# Patient Record
Sex: FEMALE | Race: WHITE | NOT HISPANIC OR LATINO | Employment: OTHER | URBAN - METROPOLITAN AREA
[De-identification: names, ages, dates, MRNs, and addresses within clinical notes are randomized per-mention and may not be internally consistent; named-entity substitution may affect disease eponyms.]

---

## 2017-01-03 ENCOUNTER — ALLSCRIPTS OFFICE VISIT (OUTPATIENT)
Dept: OTHER | Facility: OTHER | Age: 77
End: 2017-01-03

## 2017-01-04 ENCOUNTER — TRANSCRIBE ORDERS (OUTPATIENT)
Dept: ADMINISTRATIVE | Age: 77
End: 2017-01-04

## 2017-01-04 ENCOUNTER — APPOINTMENT (OUTPATIENT)
Dept: LAB | Age: 77
End: 2017-01-04
Payer: MEDICARE

## 2017-01-04 DIAGNOSIS — N83.202 CYST OF LEFT OVARY: ICD-10-CM

## 2017-01-04 LAB — CANCER AG125 SERPL-ACNC: 9.5 U/ML (ref 0–30)

## 2017-01-04 PROCEDURE — 86304 IMMUNOASSAY TUMOR CA 125: CPT

## 2017-01-04 PROCEDURE — 36415 COLL VENOUS BLD VENIPUNCTURE: CPT

## 2017-01-18 ENCOUNTER — HOSPITAL ENCOUNTER (OUTPATIENT)
Dept: RADIOLOGY | Age: 77
Discharge: HOME | End: 2017-01-18
Payer: MEDICARE

## 2017-01-18 DIAGNOSIS — Z12.31 ENCOUNTER FOR SCREENING MAMMOGRAM FOR MALIGNANT NEOPLASM OF BREAST: ICD-10-CM

## 2017-01-18 PROCEDURE — G0202 SCR MAMMO BI INCL CAD: HCPCS

## 2017-03-27 ENCOUNTER — GENERIC CONVERSION - ENCOUNTER (OUTPATIENT)
Dept: OTHER | Facility: OTHER | Age: 77
End: 2017-03-27

## 2017-06-08 ENCOUNTER — ALLSCRIPTS OFFICE VISIT (OUTPATIENT)
Dept: OTHER | Facility: OTHER | Age: 77
End: 2017-06-08

## 2017-06-08 DIAGNOSIS — M85.80 OTHER SPECIFIED DISORDERS OF BONE DENSITY AND STRUCTURE, UNSPECIFIED SITE: ICD-10-CM

## 2017-06-08 DIAGNOSIS — E03.9 HYPOTHYROIDISM: ICD-10-CM

## 2017-06-08 DIAGNOSIS — I10 ESSENTIAL (PRIMARY) HYPERTENSION: ICD-10-CM

## 2017-06-08 DIAGNOSIS — E78.5 HYPERLIPIDEMIA: ICD-10-CM

## 2017-06-08 DIAGNOSIS — Z00.00 ENCOUNTER FOR GENERAL ADULT MEDICAL EXAMINATION WITHOUT ABNORMAL FINDINGS: ICD-10-CM

## 2017-06-15 ENCOUNTER — APPOINTMENT (OUTPATIENT)
Dept: LAB | Age: 77
End: 2017-06-15
Payer: MEDICARE

## 2017-06-15 ENCOUNTER — TRANSCRIBE ORDERS (OUTPATIENT)
Dept: ADMINISTRATIVE | Age: 77
End: 2017-06-15

## 2017-06-15 DIAGNOSIS — M85.80 OTHER SPECIFIED DISORDERS OF BONE DENSITY AND STRUCTURE, UNSPECIFIED SITE: ICD-10-CM

## 2017-06-15 DIAGNOSIS — I10 ESSENTIAL (PRIMARY) HYPERTENSION: ICD-10-CM

## 2017-06-15 DIAGNOSIS — E78.5 HYPERLIPIDEMIA: ICD-10-CM

## 2017-06-15 DIAGNOSIS — Z00.00 ENCOUNTER FOR GENERAL ADULT MEDICAL EXAMINATION WITHOUT ABNORMAL FINDINGS: ICD-10-CM

## 2017-06-15 LAB
ALBUMIN SERPL BCP-MCNC: 3.4 G/DL (ref 3.5–5)
ALP SERPL-CCNC: 53 U/L (ref 46–116)
ALT SERPL W P-5'-P-CCNC: 21 U/L (ref 12–78)
ANION GAP SERPL CALCULATED.3IONS-SCNC: 6 MMOL/L (ref 4–13)
AST SERPL W P-5'-P-CCNC: 16 U/L (ref 5–45)
BASOPHILS # BLD AUTO: 0.03 THOUSANDS/ΜL (ref 0–0.1)
BASOPHILS NFR BLD AUTO: 1 % (ref 0–1)
BILIRUB SERPL-MCNC: 0.62 MG/DL (ref 0.2–1)
BILIRUB UR QL STRIP: NEGATIVE
BUN SERPL-MCNC: 17 MG/DL (ref 5–25)
CALCIUM SERPL-MCNC: 8.6 MG/DL (ref 8.3–10.1)
CHLORIDE SERPL-SCNC: 104 MMOL/L (ref 100–108)
CHOLEST SERPL-MCNC: 184 MG/DL (ref 50–200)
CLARITY UR: CLEAR
CO2 SERPL-SCNC: 28 MMOL/L (ref 21–32)
COLOR UR: YELLOW
CREAT SERPL-MCNC: 0.84 MG/DL (ref 0.6–1.3)
EOSINOPHIL # BLD AUTO: 0.16 THOUSAND/ΜL (ref 0–0.61)
EOSINOPHIL NFR BLD AUTO: 3 % (ref 0–6)
ERYTHROCYTE [DISTWIDTH] IN BLOOD BY AUTOMATED COUNT: 12.1 % (ref 11.6–15.1)
GFR SERPL CREATININE-BSD FRML MDRD: >60 ML/MIN/1.73SQ M
GLUCOSE P FAST SERPL-MCNC: 92 MG/DL (ref 65–99)
GLUCOSE UR STRIP-MCNC: NEGATIVE MG/DL
HCT VFR BLD AUTO: 39 % (ref 34.8–46.1)
HDLC SERPL-MCNC: 51 MG/DL (ref 40–60)
HGB BLD-MCNC: 12.9 G/DL (ref 11.5–15.4)
HGB UR QL STRIP.AUTO: NEGATIVE
KETONES UR STRIP-MCNC: NEGATIVE MG/DL
LDLC SERPL CALC-MCNC: 111 MG/DL (ref 0–100)
LEUKOCYTE ESTERASE UR QL STRIP: NEGATIVE
LYMPHOCYTES # BLD AUTO: 1.43 THOUSANDS/ΜL (ref 0.6–4.47)
LYMPHOCYTES NFR BLD AUTO: 29 % (ref 14–44)
MCH RBC QN AUTO: 30.4 PG (ref 26.8–34.3)
MCHC RBC AUTO-ENTMCNC: 33.1 G/DL (ref 31.4–37.4)
MCV RBC AUTO: 92 FL (ref 82–98)
MONOCYTES # BLD AUTO: 0.5 THOUSAND/ΜL (ref 0.17–1.22)
MONOCYTES NFR BLD AUTO: 10 % (ref 4–12)
NEUTROPHILS # BLD AUTO: 2.74 THOUSANDS/ΜL (ref 1.85–7.62)
NEUTS SEG NFR BLD AUTO: 57 % (ref 43–75)
NITRITE UR QL STRIP: NEGATIVE
NRBC BLD AUTO-RTO: 0 /100 WBCS
PH UR STRIP.AUTO: 7 [PH] (ref 4.5–8)
PLATELET # BLD AUTO: 258 THOUSANDS/UL (ref 149–390)
PMV BLD AUTO: 10.4 FL (ref 8.9–12.7)
POTASSIUM SERPL-SCNC: 3.7 MMOL/L (ref 3.5–5.3)
PROT SERPL-MCNC: 6.2 G/DL (ref 6.4–8.2)
PROT UR STRIP-MCNC: NEGATIVE MG/DL
RBC # BLD AUTO: 4.24 MILLION/UL (ref 3.81–5.12)
SODIUM SERPL-SCNC: 138 MMOL/L (ref 136–145)
SP GR UR STRIP.AUTO: 1.01 (ref 1–1.03)
TRIGL SERPL-MCNC: 111 MG/DL
TSH SERPL DL<=0.05 MIU/L-ACNC: 3.84 UIU/ML (ref 0.36–3.74)
UROBILINOGEN UR QL STRIP.AUTO: 0.2 E.U./DL
WBC # BLD AUTO: 4.87 THOUSAND/UL (ref 4.31–10.16)

## 2017-06-15 PROCEDURE — 80053 COMPREHEN METABOLIC PANEL: CPT

## 2017-06-15 PROCEDURE — 84443 ASSAY THYROID STIM HORMONE: CPT

## 2017-06-15 PROCEDURE — 85025 COMPLETE CBC W/AUTO DIFF WBC: CPT

## 2017-06-15 PROCEDURE — 36415 COLL VENOUS BLD VENIPUNCTURE: CPT

## 2017-06-15 PROCEDURE — 81003 URINALYSIS AUTO W/O SCOPE: CPT

## 2017-06-15 PROCEDURE — 80061 LIPID PANEL: CPT

## 2017-07-28 ENCOUNTER — APPOINTMENT (OUTPATIENT)
Dept: LAB | Age: 77
End: 2017-07-28
Payer: MEDICARE

## 2017-07-28 ENCOUNTER — GENERIC CONVERSION - ENCOUNTER (OUTPATIENT)
Dept: OTHER | Facility: OTHER | Age: 77
End: 2017-07-28

## 2017-07-28 ENCOUNTER — TRANSCRIBE ORDERS (OUTPATIENT)
Dept: ADMINISTRATIVE | Age: 77
End: 2017-07-28

## 2017-07-28 DIAGNOSIS — E03.9 HYPOTHYROIDISM: ICD-10-CM

## 2017-07-28 LAB — TSH SERPL DL<=0.05 MIU/L-ACNC: 2.63 UIU/ML (ref 0.36–3.74)

## 2017-07-28 PROCEDURE — 36415 COLL VENOUS BLD VENIPUNCTURE: CPT

## 2017-07-28 PROCEDURE — 84443 ASSAY THYROID STIM HORMONE: CPT

## 2017-07-28 PROCEDURE — 86800 THYROGLOBULIN ANTIBODY: CPT

## 2017-07-28 PROCEDURE — 86376 MICROSOMAL ANTIBODY EACH: CPT

## 2017-07-29 LAB
THYROGLOB AB SERPL-ACNC: <1 IU/ML (ref 0–0.9)
THYROPEROXIDASE AB SERPL-ACNC: 10 IU/ML (ref 0–34)

## 2017-10-23 ENCOUNTER — GENERIC CONVERSION - ENCOUNTER (OUTPATIENT)
Dept: OTHER | Facility: OTHER | Age: 77
End: 2017-10-23

## 2017-12-12 ENCOUNTER — ALLSCRIPTS OFFICE VISIT (OUTPATIENT)
Dept: OTHER | Facility: OTHER | Age: 77
End: 2017-12-12

## 2018-01-11 ENCOUNTER — OFFICE VISIT (OUTPATIENT)
Dept: URGENT CARE | Age: 78
End: 2018-01-11
Payer: MEDICARE

## 2018-01-11 PROCEDURE — 99203 OFFICE O/P NEW LOW 30 MIN: CPT | Performed by: FAMILY MEDICINE

## 2018-01-11 PROCEDURE — G0463 HOSPITAL OUTPT CLINIC VISIT: HCPCS | Performed by: FAMILY MEDICINE

## 2018-01-13 VITALS
WEIGHT: 168.25 LBS | DIASTOLIC BLOOD PRESSURE: 80 MMHG | SYSTOLIC BLOOD PRESSURE: 150 MMHG | RESPIRATION RATE: 16 BRPM | HEART RATE: 60 BPM | TEMPERATURE: 97 F | BODY MASS INDEX: 26.41 KG/M2 | HEIGHT: 67 IN

## 2018-01-13 NOTE — PROGRESS NOTES
Assessment    1  Hyperlipidemia (272 4) (E78 5)   2  Hypertension (401 9) (I10)   3  Osteoarthritis of knee (715 36) (M17 9)   4  Cyst of left ovary (620 2) (N83 20)    Plan  Hyperlipidemia    · Follow-up visit in 6 months Evaluation and Treatment  Follow-up  Status: Hold For -  Scheduling  Requested for: 43MWY7179   · Eat a low fat and low cholesterol diet ; Status:Complete;   Done: 60FQI9795  Hyperlipidemia, Hypertension, Osteoarthritis of knee    · (1) CBC/PLT/DIFF; Status:Active; Requested for:90Kmz3086;    · (1) COMPREHENSIVE METABOLIC PANEL; Status:Active; Requested for:70Pek0768;    · (1) LIPID PANEL, FASTING; Status:Active; Requested for:81Bvg2498;    · (1) TSH; Status:Active; Requested for:62Ulu9084;   Hypertension    · We encourage you to begin to make lifestyle changes to help control your blood  pressure  These may include losing weight, increasing your activity level, limiting salt in  your diet, decreasing alcohol intake, and eating a diet low in fat and rich in fruits  and vegetables ; Status:Complete;   Done: 22MHC5883  Screening for genitourinary condition    · *VB-Urinary Incontinence Screen (Dx V81 6 Screen for UI); Status:Complete -  Retrospective Authorization;   Done: 10ILA6600 12:00AM  Screening for neurological condition    · *VB - Fall Risk Assessment  (Dx V80 09 Screen for Neurologic Disorder);  Status:Complete - Retrospective By Protocol Authorization;   Done: 65SUE8737 12:00AM    Discussion/Summary    Continue with current medications  repeat labs  OV in 6 months  she has a f/u with GYN including repeat pelvic u/s  The treatment plan was reviewed with the patient/guardian  The patient/guardian understands and agrees with the treatment plan      History of Present Illness  Followup visit  medications reviewed  last labs 6/2015 see note  patient was seen 08/2015 for accelerated hypertension   Amlodipine 2 5 mg daily added to regimen of Valsartan 320 mg daily and Metoprolol 50 mg BID  blood pressure control has been better  she had developed hyponatremia and hypokalemia on HCTZ      Review of Systems    Constitutional: no recent weight changes  Eyes: no blurred vision  Cardiovascular: admission 05/2014 at [facility] with left jaw and arm pain  Normal EKG  she ruled out for MI  Nuclear stress test prior to discharge normal  she has had no recurrence  , but no chest pain and no palpitations  Respiratory: no shortness of breath, no wheezing and no cough  Gastrointestinal: colonoscopy 11/2013, but no abdominal pain, no nausea, no vomiting, no diarrhea, no constipation, no bright red blood per rectum and no melena  Genitourinary: no UI  history of recurrent UTIs followed by urology  pelvis 11/2015 4 cm simple left ovarian cyst  07/2015  normal at 7 followed by GYN , but no dysuria  Musculoskeletal: pain in other joints and recurrent right knee pain  no swelling  no giving way or locking  prior orthopedic evaluation including MRI  meniscal tear  no improvement with steroid injection  recent 2nd opinion this month  right knee x rays OA  repeat steroid injection with improvement  she is using prn ES Tylenol and Advil, but no joint swelling and no joint stiffness  Integumentary and Breasts: no rashes and no skin lesions  Neurological: no falls, but no headache and no dizziness  Psychiatric: no anxiety and no depression  Endocrine: 02/2015 DEXA scan normal       Active Problems    1  Cyst of left ovary (620 2) (N83 20)   2  Diverticulosis (562 10) (K57 90)   3  Hyperlipidemia (272 4) (E78 5)   4  Hypertension (401 9) (I10)   5  Need for prophylactic vaccination against Streptococcus pneumoniae (pneumococcus)   (V03 82) (Z23)   6  Need for prophylactic vaccination and inoculation against influenza (V04 81) (Z23)   7  Osteoarthritis of knee (715 36) (M17 9)   8  Osteopenia (733 90) (M85 80)   9  Screening for genitourinary condition (V81 6) (Z13 89)   10   Screening for neurological condition (V80 09) (Z13 89)    Past Medical History    1  History of Abdominal wall pain in right flank (789 09) (R10 9)   2  History of Accelerated essential hypertension (401 0) (I10)   3  History of Benign colon polyp (211 3) (K63 5)   4  History of Depression with anxiety (300 4) (F41 8)   5  History of DEXA Body Composition Study   6  History of Encounter for screening mammogram for malignant neoplasm of breast   (V76 12) (Z12 31)   7  History of actinic keratosis (V13 3) (Z87 2)   8  History of chest pain (V13 89) (Z87 898)   9  History of Post-menopausal bleeding (627 1) (N95 0)   10  History of Routine Gynecological Exam With Cervical Pap Smear (V72 31)   11  History of Ultrasound Gynecologic   12  History of Urinary Tract Infection (V13 02)    Surgical History    1  History of Blepharoplasty Upper Lid W/ Excessive Skin   2  History of Cardiovascular Stress Test   3  History of Dilation And Curettage   4  History of Endometrial Biopsy By Suction   5  History of Tubal Ligation    Family History  Father    1  Family history of cardiac disorder (V17 49) (Z82 49)   2  Family history of skin cancer (V16 8) (Z80 8)  Family History    3  Family history of Coronary Artery Disease (V17 49)   4  Family history of Heart Disease (V17 49)   5  Family history of Hypertension (V17 49)    Social History    · Denied: History of Alcohol   · Denied: History of Home Environment Domestic Violence   · Never A Smoker    Current Meds   1  AmLODIPine Besylate 2 5 MG Oral Tablet; take 1 tablet by mouth every day; Therapy: 85UKO1620 to (KTOLXTYW:31SDV8316)  Requested for: 51SFS4454; Last   Rx:29Jan2016 Ordered   2  Calcium TABS; Therapy: (Recorded:19Mar2014) to Recorded   3  [employer] Vit D 5000 High-Potency CAPS; Therapy: (Recorded:19Mar2014) to Recorded   4  Methenamine Hippurate 1 GM Oral Tablet; take 1/2 tablet daily Recorded   5  Metoprolol Tartrate 50 MG Oral Tablet; TAKE 1 TABLET TWICE DAILY;    Therapy: 84HDL5056 to (FPFSEDTL:36WDC6907)  Requested for: 03NRK7108; Last   Rx:29Jan2016 Ordered   6  Valsartan 320 MG Oral Tablet; take one tablet by mouth every day; Therapy: 28MYB5713 to (Evaluate:14Jan2017)  Requested for: 37UPS4698; Last   Rx:20Jan2016 Ordered   7  Vitamin C 500 MG Oral Tablet Recorded    Allergies    1  Penicillins    Vitals  Vital Signs [Data Includes: Current Encounter]    Recorded: 49BOB4607 08:08AM   Temperature 97 6 F   Heart Rate 72   Respiration 16   Systolic 844   Diastolic 80   Height 5 ft 7 in   Weight 164 lb    BMI Calculated 25 69   BSA Calculated 1 86     Physical Exam    Constitutional   General appearance: No acute distress, well appearing and well nourished  Eyes   Conjunctiva and lids: No swelling, erythema or discharge  fundi not seen  Pupils and irises: Equal, round, reactive to light  Cornea, Lens, and Sclera: Bilateral eyes: normal    Ears, Nose, Mouth, and Throat   Otoscopic examination: Tympanic membranes translucent with normal light reflex  Canals patent without erythema  Oropharynx: Normal with no erythema, edema, exudate or lesions  Neck   Neck: Supple, symmetric, trachea midline, no masses  Thyroid: Normal, no thyromegaly  Pulmonary   Auscultation of lungs: Clear to auscultation  Cardiovascular   Auscultation of heart: Normal rate and rhythm, normal S1 and S2, no murmurs  Heart sounds: no gallop heard  Carotid pulses: 2+ bilaterally  Abdominal aorta: Normal   Abdominal aorta: no bruit heard  Examination of extremities for edema and/or varicosities: Normal     Abdomen   Abdomen: Non-tender, no masses  Liver and spleen: No hepatomegaly or splenomegaly  Lymphatic   Palpation of lymph nodes in neck: No lymphadenopathy  no anterior cervical node enlargement, no posterior cervical node enlargement, no submandibular node enlargement and no supraclavicular node enlargement     Musculoskeletal   Gait and station: Normal     Joints, bones, and muscles: Abnormal   crepitus right knee  Range of motion: Normal   Range of Motion: Right Hip: Full  Right Knee: Full  Stability: Normal     Skin   Skin and subcutaneous tissue: Normal without rashes or lesions  Psychiatric   Mood and affect: Normal        Results/Data  Encounter Results   *VB - Fall Risk Assessment  (Dx V80 09 Screen for Neurologic Disorder) 14PLF5142 12:00AM Harry Standard     Test Name Result Flag Reference   Fall Risk Assessment 45BVI5988       *VB-Urinary Incontinence Screen (Dx V81 6 Screen for UI) 58AYU0006 12:00AM Harry Standard     Test Name Result Flag Reference   Urinary Incontinence Assessment 11UZS6013       Results   * US PELVIS W/TRANSVAG (PANEL) 09XKZ6364 07:53AM Guadlupe Robinson     Test Name Result Flag Reference   US PELVIS W/ TRANSVAG (PANEL) (Report)     [facility];[address] [facility];;[place];[address]   11/11/2015 0755   11/11/2015 0829   N/A     PELVIC ULTRASOUND, COMPLETE     INDICATION- Unspecified ovarian cysts  History of ovarian cyst     Follow-up  COMPARISON- Pelvic ultrasound July 14, 2015     TECHNIQUE-  Transabdominal pelvic ultrasound was performed in sagittal   and transverse planes with a curvilinear transducer  Additional   transvaginal imaging was performed to better evaluate the endometrium   and ovaries  Imaging included volumetric sweeps as well as traditional   still imaging technique  FINDINGS-     UTERUS-   The uterus is anteverted in position, measuring 7 3 x 3 5 x 4 8 cm  Contour and echotexture appear normal      The cervix shows no suspicious abnormality  ENDOMETRIUM-    Normal caliber of 5 mm  Homogenous and normal in appearance  OVARIES/ADNEXA-   Right ovary- 1 2 x 1 4 x 1 1 cm  No suspicious right ovarian abnormality  Doppler flow within normal limits  Left ovary- 4 1 x 3 4 x 3 4 cm  No suspicious left ovarian abnormality  3 6 x 3 2 x 3 8 cm simple cyst   (previously 3 6 x 3 3 x 3 8 cm)     Doppler flow within normal limits  No suspicious adnexal mass or loculated collections  There is no free fluid  IMPRESSION- No significant change in the 4 cm simple left ovarian cyst    According to the recent consensus conference statement from the   Society of Radiologists in Ultrasound (Radiology-Volume 256- Number   3-September 2010  pp [phone] ) this lesion has imaging features of a   simple cyst  In this late postmenopausal woman, this is almost   certainly benign, but should receive yearly followup by ultrasound  Transcribed on- [id number]     [facility], RAD DO   Reading Radiologist- PEDRO Le DO   Electronically Signed- PEDRO Le DO   Released Date Time- 11/11/15 0841   ------------------------------------------------------------------------------   [id number]^YOUNG SORIANO PAPARO   [id number]^YOUNG ESPOSITOARO     (1)  78NUF6746 09:05AM Terri Rickie     Test Name Result Flag Reference   (NEW) 8 0 U/mL  0 0-30 0   Performed at [facility]  [employer] Chemiluminescent methodology  Results cannot be interpreted as absolute evidence of the presence or  the absence of malignant disease  Values obtained with different assay  methods or kits cannot be used interchangeably     * US PELVIS W/TRANSVAG (PANEL) 96TDS4971 08:30AM Terri Rickie     Test Name Result Flag Reference   US PELVIS W/ Jackie (PANEL) (Report)     [facility];[address] [facility];;[place];[address]   07/14/2015 0839   07/14/2015 0901   N/A     PELVIC ULTRASOUND, COMPLETE     INDICATION- Followup left ovarian cyst           COMPARISON- 12/18/2014, 10/31/2014  TECHNIQUE-  Transabdominal pelvic ultrasound was performed in sagittal   and transverse planes with a curvilinear transducer  Additional   transvaginal imaging was performed to better evaluate the endometrium   and ovaries  Imaging included volumetric sweeps as well as traditional   still imaging technique       FINDINGS- UTERUS-   The uterus is anteverted in position, measuring 7 6 x 2 8 x 4 7 cm  Contour and echotexture appear normal      The cervix shows no suspicious abnormality  ENDOMETRIUM-    Normal caliber of 5 mm  Homogenous and normal in appearance  OVARIES/ADNEXA-   Right ovary- 1 3 x 0 8 x 1 0 cm  No suspicious right ovarian abnormality  Doppler flow within normal limits  Left ovary- Minimally larger simple cyst measuring 3 8 x 3 3 x 3 6 CM   (previously 3 5 x 3 1 x 3 1 CM)  No suspicious adnexal mass or loculated collections  There is no free fluid  IMPRESSION-     Minimally larger left ovarian simple cyst                Transcribed on- [id number]     - PEDRO Kennedy MD   Reading Radiologist- PEDRO Kennedy MD   Electronically Signed- PEDRO Kennedy MD   Released Date Time- 07/14/15 1352   ------------------------------------------------------------------------------   [id number]^BRIAN MONTAÑO   30789^BRIAN MONTAÑO     (1) POTASSIUM 90LDI1651 07:49AM Crystal Patel     Test Name Result Flag Reference   POTASSIUM 4 4 mmol/L  3 5-5 3     (1) SODIUM 00VGO9104 07:49AM Crystal Patel     Test Name Result Flag Reference   SODIUM 136 mmol/L  136-145     (1) CBC/PLT/DIFF 94ZVS8462 08:31AM Elvi June Order Number: [id number]     Test Name Result Flag Reference   DIFFERENTIAL METHOD Automated     WBC COUNT 4 75 Thousand/uL  4 31-10 16   RBC COUNT 4 11 Million/uL  3 81-5 12   HEMOGLOBIN 12 5 g/dL  11 5-15 4   HEMATOCRIT 37 3 %  34 8-46  1   MCV 91 fL  82-98   MCH 30 4 pg  26 8-34 3   MCHC 33 5 g/dL  31 4-37 4   RDW 12 1 %  11 6-15 1   MPV 9 8 fL  8 9-12 7   PLATELET COUNT 133 Thousand/uL  149-390   nRBC AUTOMATED 0 /100 WBC     NEUTROPHILS RELATIVE PERCENT 50 %  43-75   LYMPHOCYTES RELATIVE PERCENT 33 %  14-44   MONOCYTES RELATIVE PERCENT 14 % H 4-12   EOSINOPHILS RELATIVE PERCENT 3 %  0-6   NEUTROPHILS ABSOLUTE COUNT 2 38 Thousand/uL  1 85-7 62 LYMPHOCYTES ABSOLUTE COUNT 1 57 Thousand/uL  0 60-4 47   MONOCYTES ABSOLUTE COUNT 0 67 Thousand/uL  0 17-1 22   EOSINOPHILS ABSOLUTE COUNT 0 14 Thousand/uL  0 00-0 61   BASOPHILS ABSOLUTE COUNT 0 02 Thousand/uL  0 00-0 10     (1) COMPREHENSIVE METABOLIC PANEL 01AXB3192 77:92SA Cheyanne Barraza Order Number: [id number]     Test Name Result Flag Reference   SODIUM 131 mmol/L L 136-145   POTASSIUM 3 3 mmol/L L 3 5-5 3   CHLORIDE 95 mmol/L L 100-108   CARBON DIOXIDE 32 mmol/L  23-33   ANION GAP (CALC) 4 mmol/L  4-13   BILI, TOTAL 0 46 mg/dL  0 20-1 00   TOTAL PROTEIN 6 7 g/dL  6 4-8 2   ALK PHOSPHATAS 59 U/L     ALT (SGPT) 22 U/L  14-59   AST(SGOT) 19 U/L  0-45   GLUCOSE,RANDM 98 mg/dL     If patient is fasting, the ADA then defines impaired fasting glucose as  >100 mg/dl and diabetes as  >or equal to 126 mg/dl  ALBUMIN 3 4 g/dL L 3 5-5 0   BLOOD UREA NITROGEN 21 mg/dL  5-25   CALCIUM 8 6 mg/dL  8 3-10 1   CREATININE 0 79 mg/dL  0 60-1 30   Standardized to IDMS reference method   eGFR African American >60 ml/min/1 73sq m     [facility] Disease Education Program recommendations are as  follows:  GFR calculation is accurate only with a steady state creatinine  Chronic Kidney disease less than 60 ml/min/1 73 sq  meters  Kidney failure less than 15 ml/min/1 73 sq  meters     eGFR Non-African American >60 ml/min/1 73sq m       (1) LIPID PANEL, FASTING 11Jun2015 08:31AM Cheyanne Barraza Order Number: [id number]     Test Name Result Flag Reference   TRIGLYCERIDES 66 mg/dL     TRIGLYCERIDE:  Normal              <150 mg/dl  Borderline High    150-199 mg/dl  High               200-499 mg/dl  Very High          >499 mg/dl  _______________________________________   CHOLESTEROL 185 mg/dL     CHOLESTEROL:  Desirable        <200 mg/dl  Borderline High  200-239 mg/dl  High             >239 mg/dl  ____________________________________   HDL,DIRECT 60 mg/dL     HDL:  High       >59 mg/dl  Low        <41 mg/dl  ______________________________   LDL CHOLESTEROL CALCULATED 112 mg/dL H 0-100   LDL, CALCULATED:  This screening LDL is a calculated result  It does not have the accuracy of the Direct Measured LDL in the  monitoring of patients with hyperlipidemia and/or statin therapy  Direct Measured LDL (Test Code 8235) must be ordered separately in these  patients   ______________________________     (1) TSH [id number] 08:31AM Navarik Order Number: [id number]     Test Name Result Flag Reference   TSH 2 329 uIU/ML  0 550-4 78   *Patients undergoing fluorescein dye angiography may retain small  amounts of fluorescein in the body for 48-72 hours post procedure  Samples containing fluorescein can produce falsely depressed TSH   values  If the patient had this procedure, a specimen should be  resubmitted post fluorescein clearance  The recommended reference ranges for TSH during pregnancy are as  follows:  First trimester 0 1 to 2 5 uIU/mL  Second trimester  0 2 to 3 0 uIU/mL  Third trimester 0 3 to 3 0 uIU/mL     (1) VITAMIN D 25-HYDROXY 11Jun2015 08:31AM Navarik Order Number: [id number]     Test Name Result Flag Reference   VIT D 25-HYDROX 62 4 ng/mL  30 0-100 0   This assay is a certified procedure of the CDC Vitamin D Standardization  Certification Program (VDSCP)    Deficiency <20ng/ml  Insufficiency 20-30ng/ml  Sufficient  ng/ml    *Patients undergoing fluorescein dye angiography may retain small  amounts of fluorescein in the body for 48-72 hours post procedure  Samples containing fluorescein can produce falsely elevated Vitamin D  values  If the patient had this procedure, a specimen should be  resubmitted post fluorescein clearance       (1) TSH [id number] 08:31AM Navarik Order Number: [id number]     Test Name Result Flag Reference   TSH 2 329 uIU/ML  0 550-4 780   *Patients undergoing fluorescein dye angiography may retain small  amounts of fluorescein in the body for 48-72 hours post procedure  Samples containing fluorescein can produce falsely depressed TSH   values  If the patient had this procedure, a specimen should be  resubmitted post fluorescein clearance  The recommended reference ranges for TSH during pregnancy are as  follows:  First trimester 0 1 to 2 5 uIU/mL  Second trimester  0 2 to 3 0 uIU/mL  Third trimester 0 3 to 3 0 uIU/mL     (1) VITAMIN D 25-HYDROXY 39Tac4867 08:31AM Catie [facility] Order Number: [id number]     Test Name Result Flag Reference   VIT D 25-HYDROX 62 4 ng/mL  30 0-100 0   This assay is a certified procedure of the CDC Vitamin D Standardization  Certification Program (VDSCP)    Deficiency <20ng/ml  Insufficiency 20-30ng/ml  Sufficient  ng/ml    *Patients undergoing fluorescein dye angiography may retain small  amounts of fluorescein in the body for 48-72 hours post procedure  Samples containing fluorescein can produce falsely elevated Vitamin D  values  If the patient had this procedure, a specimen should be  resubmitted post fluorescein clearance       Signatures   Electronically signed by : JULIA Long ; May 27 2016  8:35AM EST                       (Author)

## 2018-01-13 NOTE — PROGRESS NOTES
Assessment   1  Acute bacterial sinusitis (461 9) (J01 90,B96 89)    Plan   Acute bacterial sinusitis    · Zithromax Z-Jose Angel 250 MG Oral Tablet (Azithromycin); TAKE 2 TABLETS ON DAY 1    THEN TAKE 1 TABLET A DAY FOR 4 DAYS   · Apply warm moist compresses to the affected area 3 times a day for 5 minutes ;    Status:Complete;   Done: 66BDI2176   · Drink at least 6 glasses of water or juice a day ; Status:Complete;   Done: 45DIA0345   · Irrigate your nose twice a day ; Status:Complete;   Done: 05ROM9977   · Taking a hot steamy shower may help your condition ; Status:Complete;   Done:    11UQR6148    Discussion/Summary   Discussion Summary:    Take Gabe Arbour as directed take probiotics daily fluids/rest massages several times daily to decrease mucus production up with your PCP if you do not improve  Medication Side Effects Reviewed: Possible side effects of new medications were reviewed with the patient/guardian today  Understands and agrees with treatment plan: The treatment plan was reviewed with the patient/guardian  The patient/guardian understands and agrees with the treatment plan    Follow Up Instructions: Follow Up with your Primary Care Provider in 5 days  If your symptoms worsen, go to the nearest [place] [address] Emergency Department  Chief Complaint   1  Cold Symptoms  Chief Complaint Free Text Note Form: c/o head congestion, post nasal drip, cough, watery eyes x 6 days, with use of OTC claritan, motrin prn  History of Present Illness   HPI: 58-year-old female with complaints of sore throat, sinus pressure, thick nasal drainage, productive cough x1 week  No fevers  She has been taking Claritin and Motrin for symptoms  Hospital Based Practices Required Assessment:      Pain Assessment      the patient states they have pain  (on a scale of 0 to 10, the patient rates the pain at 5 )      Abuse And Domestic Violence Screen       Yes, the patient is safe at home  -- The patient states no one is hurting them        Depression And Suicide Screen  No, the patient has not had thoughts of hurting themself  No, the patient has not felt depressed in the past 7 days  Prefered Language is  english  Review of Systems   Focused-Female:      Constitutional: feeling poorly-- and-- feeling tired, but-- no fever  ENT: sore throat-- and-- nasal discharge  Cardiovascular: no chest pain  Respiratory: cough, but-- no shortness of breath-- and-- no wheezing  ROS Reviewed:    ROS reviewed  Active Problems   1  Advance directive discussed with patient (V65 49) (Z71 89)   2  Cyst of left ovary (620 2) (N83 202)   3  Diverticulosis (562 10) (K57 90)   4  Hyperlipidemia (272 4) (E78 5)   5  Hypertension (401 9) (I10)   6  Need for prophylactic vaccination and inoculation against influenza (V04 81) (Z23)   7  Osteoarthritis of knee (715 36) (M17 10)   8  Osteopenia (733 90) (M85 80)   9  Post-menopausal bleeding (627 1) (N95 0)   10  Subclinical hypothyroidism (244 8) (E03 9)    Past Medical History   1  History of Accelerated essential hypertension (401 0) (I10)   2  History of Benign colon polyp (211 3) (K63 5)   3  History of Depression with anxiety (300 4) (F41 8)   4  History of actinic keratosis (V13 3) (Z87 2)   5  History of Urinary Tract Infection (V13 02)  Active Problems And Past Medical History Reviewed: The active problems and past medical history were reviewed and updated today  Family History   Father    1  Family history of cardiac disorder (V17 49) (Z82 49)   2  Family history of skin cancer (V16 8) (Z80 8)  Sister    3  Family history of Colon cancer  Family History    4  Family history of Coronary Artery Disease (V17 49)   5  Family history of Heart Disease (V17 49)   6  Family history of Hypertension (V17 49)  Family History Reviewed: The family history was reviewed and updated today         Social History    · Denied: History of Alcohol   · Denied: History of Home Environment Domestic Violence   · Never A Smoker  Social History Reviewed: The social history was reviewed and updated today  The social history was reviewed and is unchanged  Surgical History   1  History of Blepharoplasty Upper Lid W/ Excessive Skin   2  History of Cardiovascular Stress Test   3  History of Dilation And Curettage   4  History of Endometrial Biopsy By Suction   5  History of Tubal Ligation  Surgical History Reviewed: The surgical history was reviewed and updated today  Current Meds    1  AmLODIPine Besylate 2 5 MG Oral Tablet; take 1 tablet by mouth every day; Therapy: 70RBV8877 to (Evaluate:03Jan2019)  Requested for: 54GXD5620; Last     Rx:08Jan2018 Ordered   2  Calcium TABS; Therapy: (Recorded:19Mar2014) to Recorded   3  [employer] Vit D 5000 High-Potency CAPS; Therapy: (Recorded:19Mar2014) to Recorded   4  Methenamine Hippurate 1 GM Oral Tablet; take 1/2 tablet daily Recorded   5  Metoprolol Tartrate 50 MG Oral Tablet; TAKE 1 TABLET TWICE DAILY; Therapy: 63Rmb2471 to (Evaluate:03Jan2019)  Requested for: 27YQB2016; Last     Rx:08Jan2018 Ordered   6  Valsartan 320 MG Oral Tablet; take one tablet by mouth every day; Therapy: 82YUX8273 to (Evaluate:03Jan2019)  Requested for: 50ROO6231; Last     BX:96QSZ1476 Ordered  Medication List Reviewed: The medication list was reviewed and updated today  Allergies   1  Penicillins    Vitals   Signs   Recorded: 17RZH7646 07:45AM   Temperature: 96 2 F, Temporal  Heart Rate: 59  Respiration: 20  Systolic: 630  Diastolic: 72  Weight: 109 lb   BMI Calculated: 25 84  BSA Calculated: 1 86  O2 Saturation: 98  LMP:   Pain Scale: 6    Physical Exam        Constitutional      General appearance: No acute distress, well appearing and well nourished  Ears, Nose, Mouth, and Throat      External inspection of ears and nose: Normal        Otoscopic examination: Tympanic membranes translucent with normal light reflex   Canals patent without erythema  -- TTP maxillary sinuses  -- mildly erythematous posterior pharynx  No exudate  + post nasal drip  Pulmonary      Respiratory effort: No increased work of breathing or signs of respiratory distress  Auscultation of lungs: Clear to auscultation  Cardiovascular      Auscultation of heart: Normal rate and rhythm, normal S1 and S2, without murmurs  Lymphatic      Palpation of lymph nodes in neck: No lymphadenopathy  Psychiatric      Orientation to person, place, and time: Normal        Mood and affect: Normal        Future Appointments      Date/Time Provider Specialty Site   06/12/2018 08:00 AM JULIA Carranza  Family Medicine [address]   01/15/2018 10:45 AM JULIA Mckinley   Obstetrics/Gynecology [address] [facility]   01/15/2018 10:30 AM [facility]     Signatures    Electronically signed by : William Aparicio, [facility]; Jan 11 2018  8:11AM EST                       (Author)     Electronically signed by : Chelo West DO; Jan 11 2018  4:20PM EST                       (Co-author)

## 2018-01-14 NOTE — RESULT NOTES
Discussion/Summary   repeat TSH or thyroid study normal       Verified Results  (1) TSH WITH FT4 REFLEX 38Okp9501 10:41AM Jackie Bonilla Order Number: [id number]     Test Name Result Flag Reference   TSH 2 630 uIU/mL  0 358-3 740   Patients undergoing fluorescein dye angiography may retain small amounts of fluorescein in the body for 48-72 hours post procedure  Samples containing fluorescein can produce falsely depressed TSH values  If the patient had this procedure,a specimen should be resubmitted post fluorescein clearance            The recommended reference ranges for TSH during pregnancy are as follows:  First trimester 0 1 to 2 5 uIU/mL  Second trimester  0 2 to 3 0 uIU/mL  Third trimester 0 3 to 3 0 uIU/m

## 2018-01-14 NOTE — PROGRESS NOTES
Chief Complaint  Patient in office for nurse BP check  /72, pulse 78, patient states that at home her BP runs in 138 to mid 140's/ 70's  Dr Ora Campbell notified, med list reviewed and Dr Ora Campbell suggested that patient continue same medications and return in 3-4 weeks for nurse BP check and to bring home BP cuff with patient  Patient informed and agreed to make appointment in 3-4 weeks and she will bring home BP cuff  Active Problems    1  Cyst of left ovary (620 2) (N83 20)   2  Diverticulosis (562 10) (K57 90)   3  Hyperlipidemia (272 4) (E78 5)   4  Hypertension (401 9) (I10)   5  Need for prophylactic vaccination against Streptococcus pneumoniae (pneumococcus)   (V03 82) (Z23)   6  Need for prophylactic vaccination and inoculation against influenza (V04 81) (Z23)   7  Osteoarthritis of knee (715 36) (M17 9)   8  Osteopenia (733 90) (M85 80)   9  Screening for genitourinary condition (V81 6) (Z13 89)   10  Screening for neurological condition (V80 09) (Z13 89)    Current Meds   1  AmLODIPine Besylate 2 5 MG Oral Tablet; take 1 tablet by mouth every day; Therapy: 12YGN0722 to (VFVIZAPS:19AEX1120)  Requested for: 82MTV6769; Last   Rx:29Jan2016 Ordered   2  Calcium TABS; Therapy: (Recorded:19Mar2014) to Recorded   3  [employer]S Vit D 5000 High-Potency CAPS; Therapy: (Recorded:19Mar2014) to Recorded   4  Methenamine Hippurate 1 GM Oral Tablet; take 1/2 tablet daily Recorded   5  Metoprolol Tartrate 50 MG Oral Tablet; TAKE 1 TABLET TWICE DAILY; Therapy: 37Wpw1280 to (TWEEBCYW:62KYX9950)  Requested for: 53ASB9003; Last   Rx:29Jan2016 Ordered   6  Valsartan 320 MG Oral Tablet; take one tablet by mouth every day; Therapy: 30PWL3043 to (Evaluate:14Jan2017)  Requested for: 84GXJ4839; Last   Rx:20Jan2016 Ordered   7  Vitamin C 500 MG Oral Tablet Recorded    Allergies    1   Penicillins    Vitals  Signs    Systolic: 975  Diastolic: 72  Heart Rate: 78  Respiration: 16  Temperature: 98 F  Height: 5 ft 7 in  Weight: 167 lb   BMI Calculated: 26 16  BSA Calculated: 1 87    Future Appointments    Date/Time Provider Specialty Site   11/30/2016 08:00 AM JULIA Mcmillan   Family Medicine [facility]   08/18/2016 02:00 PM [facility] practice, Nurse Schedule  [address]     Signatures   Electronically signed by : Manasa Tamayo, ; Jul 26 2016  2:25PM EST                       (Author)    Electronically signed by : JULIA Matute ; Jul 26 2016  2:35PM EST                       (Author)

## 2018-01-15 ENCOUNTER — ALLSCRIPTS OFFICE VISIT (OUTPATIENT)
Dept: OTHER | Facility: OTHER | Age: 78
End: 2018-01-15

## 2018-01-15 DIAGNOSIS — N83.209 CYST OF OVARY: ICD-10-CM

## 2018-01-16 NOTE — RESULT NOTES
Message  Karrin Lesches I have enclose a copy of recent labs  all results normal  continue with your current medications      Results/Data  Results    (1) CBC/PLT/DIFF   WBC COUNT: 4 54 Thousand/uL Reference Range 4 31-10 16  RBC COUNT: 4 12 Million/uL Reference Range 3 81-5 12  HEMOGLOBIN: 12 7 g/dL Reference Range 11 5-15 4  HEMATOCRIT: 38 1 % Reference Range 34 8-46  1  MCV: 93 fL Reference Range 82-98  MCH: 30 8 pg Reference Range 26 8-34 3  MCHC: 33 3 g/dL Reference Range 31 4-37 4  RDW: 12 6 % Reference Range 11 6-15 1  MPV: 10 0 fL Reference Range 8 9-12 7  PLATELET COUNT: 370 Thousands/uL Reference Range 149-390  nRBC AUTOMATED: 0 /100 WBCs  NEUTROPHILS RELATIVE PERCENT: 54 % Reference Range 43-75  LYMPHOCYTES RELATIVE PERCENT: 32 % Reference Range 14-44  MONOCYTES RELATIVE PERCENT: 11 % Reference Range 4-12  EOSINOPHILS RELATIVE PERCENT: 3 % Reference Range 0-6  BASOPHILS RELATIVE PERCENT: 0 % Reference Range 0-1  NEUTROPHILS ABSOLUTE COUNT: 2 41 Thousands/?L Reference Range 1 85-7 62  LYMPHOCYTES ABSOLUTE COUNT: 1 47 Thousands/?L Reference Range 0 60-4 47  MONOCYTES ABSOLUTE COUNT: 0 48 Thousand/?L Reference Range 0 17-1 22  EOSINOPHILS ABSOLUTE COUNT: 0 15 Thousand/?L Reference Range 0 00-0 61  BASOPHILS ABSOLUTE COUNT: 0 02 Thousands/?L Reference Range 0 00-0 10  (1) COMPREHENSIVE METABOLIC PANEL   SODIUM: 745 mmol/L Reference Range 136-145  POTASSIUM: 3 7 mmol/L Reference Range 3 5-5 3  CHLORIDE: 104 mmol/L Reference Range 100-108  CARBON DIOXIDE: 25 mmol/L Reference Range 21-32  ANION GAP (CALC): 8 mmol/L Reference Range 4-13  BLOOD UREA NITROGEN: 12 mg/dL Reference Range 5-25  CREATININE: 0 76 mg/dL Reference Range 0 60-1 30  GLUCOSE,RANDM: 82 mg/dL Reference Range   CALCIUM: 8 6 mg/dL Reference Range 8 3-10 1  AST(SGOT): 20 U/L Reference Range 5-45  ALT (SGPT): 35 U/L Reference Range 12-78  ALK PHOSPHATAS: 56 U/L Reference Range   TOTAL PROTEIN: 6 4 g/dL Reference Range 6 4-8 2  ALBUMIN: 3 3 g/dL Abnormal Low Reference Range 3 5-5 0  BILI, TOTAL: 0 52 mg/dL Reference Range 0 20-1 00  eGFR Non-: >60 0 ml/min/1 73sq m  (1) LIPID PANEL, FASTING   CHOLESTEROL: 182 mg/dL Reference Range   TRIGLYCERIDES: 115 mg/dL Reference Range <=150  HDL,DIRECT: 49 mg/dL Reference Range 40-60  LDL CHOLESTEROL CALCULATED: 110 mg/dL Abnormal High Reference Range  0-100  (1) TSH   TSH: 3 430 uIU/mL Reference Range 0 358-3 740    Signatures   Electronically signed by : JULIA Ordoñez ; Jul 14 2016  8:46PM EST                       (Author)

## 2018-01-17 NOTE — PROCEDURES
Active Problems   1  Acute bacterial sinusitis (461 9) (J01 90,B96 89)   2  Advance directive discussed with patient (V65 49) (Z71 89)   3  Cyst of left ovary (620 2) (N83 202)   4  Diverticulosis (562 10) (K57 90)   5  Hyperlipidemia (272 4) (E78 5)   6  Hypertension (401 9) (I10)   7  Need for prophylactic vaccination and inoculation against influenza (V04 81) (Z23)   8  Osteoarthritis of knee (715 36) (M17 10)   9  Osteopenia (733 90) (M85 80)   10  Post-menopausal bleeding (627 1) (N95 0)   11  Subclinical hypothyroidism (244 8) (E03 9)    Current Meds   1  Zithromax Z-Jose Angel 250 MG Oral Tablet; TAKE 2 TABLETS ON DAY 1 THEN TAKE 1 TABLET     A DAY FOR 4 DAYS; Therapy: 00SGP0578 to (Last Rx:11Jan2018)  Requested for: 36HZQ1999 Ordered  2  Calcium TABS; Therapy: (Recorded:19Mar2014) to Recorded   3  [employer]S Vit D 5000 High-Potency CAPS; Therapy: (Recorded:19Mar2014) to Recorded  4  AmLODIPine Besylate 2 5 MG Oral Tablet; take 1 tablet by mouth every day; Therapy: 30HZV9783 to (Evaluate:03Jan2019)  Requested for: 09MYX5696; Last     Rx:08Jan2018 Ordered   5  Metoprolol Tartrate 50 MG Oral Tablet; TAKE 1 TABLET TWICE DAILY; Therapy: 44Bfg5746 to (Evaluate:03Jan2019)  Requested for: 31WGI8139; Last     Rx:08Jan2018 Ordered   6  Valsartan 320 MG Oral Tablet; take one tablet by mouth every day; Therapy: 44BBF5815 to (Evaluate:03Jan2019)  Requested for: 73LQD8051; Last     Rx:08Jan2018 Ordered  7  Methenamine Hippurate 1 GM Oral Tablet; take 1/2 tablet daily Recorded    Allergies   1  Penicillins    Results/Data   Transvaginal Ultrasound:      Procedure: Transvaginal Pelvic Sonogram -- The study was done today in the office  Indication: Ovarian Cyst       Procedure Note:      Patient placed in dorsal lithotomy position with legs in stirrups  Ultrasound probe was covered wtih a condom, lubricated with water soluable gel  The ultrasound study was then performed      Findings:      Uterus:  UT=72 x 37 x 44 mm  Ovaries:  RT OV=31 x 28 x 26 mm,LT OV cyst=39 x 46 x 38 mm,with NO color doppler flow within  Endometrium:  3mm  Cervix:  WNL  Pelvic Stuctures:  LT OV Cyst       Impression:  LT OV Cyst=46 x 38 x 39 mm  Patient Status: the patient tolerated the procedure well  Complications:  there were no complications  Future Appointments      Date/Time Provider Specialty Site   06/12/2018 08:00 AM JULIA Neves   Family Medicine [facility]     Signatures    Electronically signed by : Hank Drummond, ; Babak 15 2018 10:26AM EST                       (Author)     Electronically signed by : JULIA Ibarra ; Jan 16 2018 12:39PM EST                       (Author)

## 2018-01-17 NOTE — PROGRESS NOTES
Assessment   1  Cyst of ovary (620 2) (N83 209)    Plan   Cyst of ovary    · (1) ; Status:Active - Retrospective By Protocol Authorization; Requested    DGA:[id number]; Perform:[facility]; UFT:[id number]; Last Updated By:Yina Oscar; 1/15/2018 11:07:42 AM;Ordered; For:Cyst of ovary; Ordered By:Amarilis Ghotra; Discussion/Summary   Discussion Summary:    TOPIC: LEFT Ovarian CYST     I carefully reviewed the results of the Pelvic US     The LEFT Ovarian cyst is still present It has remained STABLE from last year [id number]     This year it is [id number]     Remainder of the Scan is NORMAL     I spoke to the patient and relayed these results to her     Will check f/u CA-125   questions were answered     Routine follow-up as planned  Counseling Documentation With Imm: The patient was counseled regarding diagnostic results,-- instructions for management,-- risk factor reductions,-- prognosis,-- patient and family education,-- impressions,-- risks and benefits of treatment options,-- importance of compliance with treatment  total time of encounter was 15 minutes-- and-- 15 minutes was spent counseling  Goals and Barriers: The patient has the current Goals: F/u of LEFT Ovarian Cyst  The patent has the current Barriers: None  Patient's Capacity to Self-Care: Patient is able to Self-Care  Patient Education: Discuss Pelvic US results with patient  Medication SE Review and Pt Understands Tx: The treatment plan was reviewed with the patient/guardian  The patient/guardian understands and agrees with the treatment plan    Self Referrals:    Self Referrals: No      Chief Complaint   Chief Complaint Free Text Note Form: Discuss US results      History of Present Illness   HPI: Patient is here for discussion of the Pelvic US      Active Problems   1  Acute bacterial sinusitis (461 9) (J01 90,B96 89)   2  Advance directive discussed with patient (V65 49) (Z81 89)   3   Cyst of left ovary (620 2) (N83 202)   4  Diverticulosis (562 10) (K57 90)   5  Hyperlipidemia (272 4) (E78 5)   6  Hypertension (401 9) (I10)   7  Need for prophylactic vaccination and inoculation against influenza (V04 81) (Z23)   8  Osteoarthritis of knee (715 36) (M17 10)   9  Osteopenia (733 90) (M85 80)   10  Post-menopausal bleeding (627 1) (N95 0)   11  Subclinical hypothyroidism (244 8) (E03 9)    Past Medical History   1  History of Accelerated essential hypertension (401 0) (I10)   2  History of Benign colon polyp (211 3) (K63 5)   3  History of Depression with anxiety (300 4) (F41 8)   4  History of actinic keratosis (V13 3) (Z87 2)   5  History of Urinary Tract Infection (V13 02)    Surgical History   1  History of Blepharoplasty Upper Lid W/ Excessive Skin   2  History of Cardiovascular Stress Test   3  History of Dilation And Curettage   4  History of Endometrial Biopsy By Suction   5  History of Tubal Ligation    Family History   Father    1  Family history of cardiac disorder (V17 49) (Z82 49)   2  Family history of skin cancer (V16 8) (Z80 8)  Sister    3  Family history of Colon cancer  Family History    4  Family history of Coronary Artery Disease (V17 49)   5  Family history of Heart Disease (V17 49)   6  Family history of Hypertension (V17 49)    Social History    · Denied: History of Alcohol   · Denied: History of Home Environment Domestic Violence   · Never A Smoker    Current Meds    1  AmLODIPine Besylate 2 5 MG Oral Tablet; take 1 tablet by mouth every day; Therapy: 05JRV4939 to (Evaluate:03Jan2019)  Requested for: 38XJC8166; Last     Rx:08Jan2018 Ordered   2  Calcium TABS; Therapy: (Recorded:19Mar2014) to Recorded   3  [employer]S Vit D 5000 High-Potency CAPS; Therapy: (Recorded:19Mar2014) to Recorded   4  Methenamine Hippurate 1 GM Oral Tablet; take 1/2 tablet daily Recorded   5  Metoprolol Tartrate 50 MG Oral Tablet; TAKE 1 TABLET TWICE DAILY;      Therapy: 46Ggr2628 to (Evaluate:03Jan2019)  Requested for: 59CKF0162; Last     Rx:08Jan2018 Ordered   6  Valsartan 320 MG Oral Tablet; take one tablet by mouth every day; Therapy: 70WXR8319 to (Evaluate:03Jan2019)  Requested for: 52LIN8540; Last     Rx:08Jan2018 Ordered   7  Zithromax Z-Jose Angel 250 MG Oral Tablet; TAKE 2 TABLETS ON DAY 1 THEN TAKE 1 TABLET     A DAY FOR 4 DAYS; Therapy: 30EBV5796 to (Last Rx:11Jan2018)  Requested for: 73FYU2116 Ordered    Allergies   1  Penicillins    Future Appointments      Date/Time Provider Specialty Site   06/12/2018 08:00 AM JULIA Dueñas   Family Medicine [address]     Signatures    Electronically signed by : JULIA Tyson ; Jan 16 2018 12:45PM EST                       (Author)

## 2018-01-22 ENCOUNTER — APPOINTMENT (OUTPATIENT)
Dept: LAB | Age: 78
End: 2018-01-22
Payer: MEDICARE

## 2018-01-22 ENCOUNTER — TRANSCRIBE ORDERS (OUTPATIENT)
Dept: ADMINISTRATIVE | Age: 78
End: 2018-01-22

## 2018-01-22 VITALS
HEART RATE: 70 BPM | RESPIRATION RATE: 16 BRPM | BODY MASS INDEX: 25.98 KG/M2 | TEMPERATURE: 97.6 F | SYSTOLIC BLOOD PRESSURE: 130 MMHG | WEIGHT: 165.5 LBS | HEIGHT: 67 IN | DIASTOLIC BLOOD PRESSURE: 84 MMHG

## 2018-01-22 DIAGNOSIS — N83.209 CYST OF OVARY: ICD-10-CM

## 2018-01-22 LAB — CANCER AG125 SERPL-ACNC: 5.6 U/ML (ref 0–30)

## 2018-01-22 PROCEDURE — 86304 IMMUNOASSAY TUMOR CA 125: CPT

## 2018-01-22 PROCEDURE — 36415 COLL VENOUS BLD VENIPUNCTURE: CPT

## 2018-01-23 VITALS
RESPIRATION RATE: 20 BRPM | WEIGHT: 165 LBS | SYSTOLIC BLOOD PRESSURE: 152 MMHG | DIASTOLIC BLOOD PRESSURE: 72 MMHG | OXYGEN SATURATION: 98 % | HEART RATE: 59 BPM | BODY MASS INDEX: 25.84 KG/M2 | TEMPERATURE: 96.2 F

## 2018-01-23 NOTE — PROGRESS NOTES
Assessment    1  Encounter for preventive health examination (V70 0) (Z00 00)   2  Hypertension (401 9) (I10)   3  Hyperlipidemia (272 4) (E78 5)   4  Osteopenia (733 90) (M85 80)   5  Subclinical hypothyroidism (244 8) (E03 9)   6  Cyst of left ovary (620 2) (N83 202)    Plan  Cyst of left ovary, Health Maintenance, Hyperlipidemia, Hypertension, Osteopenia,  Subclinical hypothyroidism    · (1) CBC/PLT/DIFF; Status:Active; Requested for:14May2018;    · (1) COMPREHENSIVE METABOLIC PANEL; Status:Active; Requested for:14May2018;    · (1) LIPID PANEL, FASTING; Status:Active; Requested for:14May2018;    · (1) TSH WITH FT4 REFLEX; Status:Active; Requested for:14May2018;    · (1) VITAMIN D 25-HYDROXY; Status:Active; Requested for:14May2018;   Need for prophylactic vaccination and inoculation against influenza    · Fluzone High-Dose 0 5 ML Intramuscular Suspension Prefilled Syringe    Discussion/Summary    Continue with current medications monitor BPs at home  high-dose flu vaccine today  repeat labs prior to OV in 6 months  Impression: Subsequent Annual Wellness Visit, with preventive exam as well as age and risk appropriate counseling completed  Cardiovascular screening and counseling: screening not indicated, counseling was given on maintaining a healthy diet and counseling was given on maintaining a healthy weight  Diabetes screening and counseling: screening is current  Colorectal cancer screening and counseling: screening is current  Breast cancer screening and counseling: screening is current  Cervical cancer screening and counseling: screening not indicated  Osteoporosis screening and counseling: screening is current  Abdominal aortic aneurysm screening and counseling: screening is current  Glaucoma screening and counseling: screening is current   Immunizations: influenza vaccine is due today, the lifetime pneumococcal vaccine has been completed, the risks and benefits of the Zostavax vaccine were discussed with the patient and the patient declines the Zostavax vaccine  Advice and education were given regarding fall risk reduction and nutrition (non-diabetic)  She was referred to gynecology and urology  Patient Discussion: follow-up visit needed in 6 months  Possible side effects of new medications were reviewed with the patient/guardian today  The treatment plan was reviewed with the patient/guardian  The patient/guardian understands and agrees with the treatment plan      History of Present Illness  HPI: Followup visit  medications reviewed  labs 6/2017 see note  hypertension BPs have been stable on current 3 drug regimen  creatinine 0 84  electrolytes normal  lipid profile cholesterol 184  TGs 111  HDL 51    FBS 92    she is currently on Amlodipine 2 5 mg daily, Valsartan 320 mg daily and Metoprolol 50 mg BID  she had developed hyponatremia and hypokalemia on HCTZ       Welcome to [employer] and Wellness Visits: The patient is being seen for the subsequent annual wellness visit  Medicare Screening and Risk Factors   Hospitalizations: she has been previously hospitalizied  Once per lifetime medicare screening tests: ECG (01/2014) and AAA screening [place] (2014  no AAA)  Medicare Screening Tests Risk Questions   Abdominal aortic aneurysm risk assessment: none indicated  Osteoporosis risk assessment: , female gender and over 48years of age  Drug and Alcohol Use: The patient has never smoked cigarettes  The patient reports occasional alcohol use  She has never used illicit drugs  Diet and Physical Activity: Current diet includes well balanced meals, 2 servings of fruit per day, 2 servings of vegetables per day, 1 servings of meat per day, 1 servings of whole grains per day, 3 servings of simple carbohydrates per day, 2 servings of dairy products per day, 0 cups of coffee per day, 1 cups of tea per day, 0 cans of regular soda per day and 0 cans of diet soda per day   She exercises 4 times per week  Exercise: walking, stretching, strength training 4 hours per week  Mood Disorder and Cognitive Impairment Screening: PHQ-9 Depression Scale   Over the past 2 weeks, how often have you been bothered by the following problems? 1 ) Little interest or pleasure in doing things? Not at all    2 ) Feeling down, depressed or hopeless? Not at all    3 ) Trouble falling asleep or sleeping too much? Several days  4 ) Feeling tired or having little energy? Not at all    5 ) Poor appetite or overeating? Not at all    6 ) Feeling bad about yourself, or that you are a failure, or have let yourself or your family down? Not at all    7 ) Trouble concentrating on things, such as reading a newspaper or watching television? Not at all    8 ) Moving or speaking so slowly that other people could have noticed, or the opposite, moving or speaking faster than usual? Not at all  TOTAL SCORE: 1  How difficult have these problems made it for you to do your work, take care of things at home, or get along with people? Not at all  Cognitive impairment screening: denies difficulty learning/retaining new information, denies difficulty handling complex tasks, denies difficulty with reasoning, denies difficulty with spatial ability and orientation, denies difficulty with language and denies difficulty with behavior  Functional Ability/Level of Safety: Hearing is normal bilaterally  She does not use a hearing aid  The patient is currently able to do activities of daily living without limitations, able to do instrumental activities of daily living without limitations, able to participate in social activities without limitations and able to drive without limitations  Fall risk factors: The patient fell 0 times in the past 12 months  Injury History: antihypertensive use     Advance Directives: Advance directives: no living will, no durable power of  for health care directives and no advance directives  Co-Managers and Medical Equipment/Suppliers: See Patient Care Team      Patient Care Team    Care Team Member Role Specialty Office Number   Dawood Hodgson MD  Obstetrics/Gynecology [phone]   Lashell Church MD  Urology [phone]   Celestina Maxwell MD  Dermatology [phone]     Review of Systems    Constitutional: no recent weight changes  Eyes: no blurred vision  ENT: no earache, no sore throat and no hoarseness  Cardiovascular: admission 05/2014 at [facility] with left jaw and arm pain  Normal EKG  she ruled out for MI  Nuclear stress test prior to discharge normal  she has had no recurrence  , but no chest pain and no palpitations  Respiratory: no shortness of breath, no wheezing and no cough  Gastrointestinal: constipation and constipation not new  colonoscopy 11/2013  sister recently diagnosed with colon CA , but no abdominal pain, no nausea, no vomiting, no diarrhea, no bright red blood per rectum and no melena  Genitourinary: no UI  history of recurrent UTIs followed by urology  pelvic u/s 08/2016 stable simple left ovarian cyst  01/2017  normal at 9 5 followed by GYN , but no dysuria  Musculoskeletal: pain in other joints and prior orthopedic evaluation for right knee pain including MRI  meniscal tear  no improvement with steroid injection  she had a 2nd opinion  not a candidate for TKR  right knee x rays OA  no pain at present, but no joint swelling and no joint stiffness  Integumentary and Breasts: no rashes and no skin lesions  Neurological: headache and occasional fleeting headaches  no falls, but no dizziness  Psychiatric: no anxiety and no depression  Endocrine: 02/2015 DEXA scan normal    Hematologic and Lymphatic: no swollen glands, no tendency for easy bleeding and no tendency for easy bruising  Active Problems    1  Advance directive discussed with patient (V65 49) (Z29 53)   2  Cyst of left ovary (620 2) (N83 953)   3  Diverticulosis (562 10) (K57 90)   4  Hyperlipidemia (272 4) (E78 5)   5  Hypertension (401 9) (I10)   6  Need for prophylactic vaccination and inoculation against influenza (V04 81) (Z23)   7  Osteoarthritis of knee (715 36) (M17 10)   8  Osteopenia (733 90) (M85 80)   9  Post-menopausal bleeding (627 1) (N95 0)   10  Subclinical hypothyroidism (244 8) (E03 9)    Past Medical History    · History of Accelerated essential hypertension (401 0) (I10)   · History of Benign colon polyp (211 3) (K63 5)   · History of Depression with anxiety (300 4) (F41 8)   · History of actinic keratosis (V13 3) (Z87 2)   · History of Urinary Tract Infection (V13 02)    Surgical History    · History of Blepharoplasty Upper Lid W/ Excessive Skin   · History of Cardiovascular Stress Test   · History of Dilation And Curettage   · History of Endometrial Biopsy By Suction   · History of Tubal Ligation    Family History  Father    · Family history of cardiac disorder (V17 49) (Z82 49)   · Family history of skin cancer (V16 8) (Z80 8)  Sister    · Family history of Colon cancer  Family History    · Family history of Coronary Artery Disease (V17 49)   · Family history of Heart Disease (V17 49)   · Family history of Hypertension (V17 49)    The family history was reviewed and updated today  Social History    · Denied: History of Alcohol   · Denied: History of Home Environment Domestic Violence   · Never A Smoker  The social history was reviewed and updated today  Current Meds   1  AmLODIPine Besylate 2 5 MG Oral Tablet; take 1 tablet by mouth every day; Therapy: 93CUL4999 to (Evaluate:08Cnv9524)  Requested for: 27GRX8111; Last   Rx:05Jan2017 Ordered   2  Calcium TABS; Therapy: (Recorded:19Mar2014) to Recorded   3  [employer] Vit D 5000 High-Potency CAPS; Therapy: (Recorded:19Mar2014) to Recorded   4  Methenamine Hippurate 1 GM Oral Tablet; take 1/2 tablet daily Recorded   5   Metoprolol Tartrate 50 MG Oral Tablet; TAKE 1 TABLET TWICE DAILY; Therapy: 84Vmo3644 to (Evaluate:28Fjz4741)  Requested for: 30OQR5458; Last   Rx:05Jan2017 Ordered   6  Valsartan 320 MG Oral Tablet; take one tablet by mouth every day; Therapy: 87OIP9591 to (Evaluate:34Mrj4652)  Requested for: 47NGF7088; Last   Rx:05Jan2017 Ordered    Allergies    1  Penicillins    Immunizations   ** Printed in Appendix #1 below  Vitals  Signs    Temperature: 97 6 F  Heart Rate: 70  Respiration: 16  Systolic: 699  Diastolic: 84  Height: 5 ft 7 in  Weight: 165 lb 8 oz  BMI Calculated: 25 92  BSA Calculated: 1 87    Physical Exam    Constitutional   General appearance: No acute distress, well appearing and well nourished  Eyes   Conjunctiva and lids: No swelling, erythema or discharge  Pupils and irises: Equal, round, reactive to light  Ophthalmoscopic examination: Normal fundi and optic discs  Ears, Nose, Mouth, and Throat   Otoscopic examination: Tympanic membranes translucent with normal light reflex  Canals patent without erythema  Oropharynx: Normal with no erythema, edema, exudate or lesions  Neck   Neck: Supple, symmetric, trachea midline, no masses  Thyroid: Normal, no thyromegaly  There were no thyroid nodules  Pulmonary   Respiratory effort: No increased work of breathing or signs of respiratory distress  Auscultation of lungs: Clear to auscultation  Cardiovascular   Auscultation of heart: Normal rate and rhythm, normal S1 and S2, no murmurs  Heart sounds: no gallop heard  Carotid pulses: 2+ bilaterally  Abdominal aorta: Normal   Abdominal aorta: no bruit heard  Examination of extremities for edema and/or varicosities: Normal     Abdomen   Abdomen: Non-tender, no masses  Liver and spleen: No hepatomegaly or splenomegaly  Lymphatic   Palpation of lymph nodes in neck: No lymphadenopathy    no anterior cervical node enlargement, no posterior cervical node enlargement, no submandibular node enlargement and no supraclavicular node enlargement  Musculoskeletal   Gait and station: Normal     Range of motion: Normal     Skin   Skin and subcutaneous tissue: Normal without rashes or lesions  Neurologic   Cranial nerves: Cranial nerves II-XII intact  Psychiatric   Recent and remote memory: Intact  Mood and affect: Normal        Results/Data  Falls Risk Assessment (Dx Z13 89 Screen for Neurologic Disorder) 26GQM6021 08:12AM User, Todds     Test Name Result Flag Reference   Falls Risk      No falls in the past year     (1) TSH WITH FT4 REFLEX 28Jul2017 10:41AM Sukh Payne Order Number: [id number]     Test Name Result Flag Reference   TSH 2 630 uIU/mL  0 358-3 740   Patients undergoing fluorescein dye angiography may retain small amounts of fluorescein in the body for 48-72 hours post procedure  Samples containing fluorescein can produce falsely depressed TSH values  If the patient had this procedure,a specimen should be resubmitted post fluorescein clearance            The recommended reference ranges for TSH during pregnancy are as follows:  First trimester 0 1 to 2 5 uIU/mL  Second trimester  0 2 to 3 0 uIU/mL  Third trimester 0 3 to 3 0 uIU/m     (1) THYROID ANTIBODIES PANEL 28Jul2017 10:41AM Sukh Payne Order Number: [id number]     Test Name Result Flag Reference   THYROGLOB AB <1 0 IU/mL  0 0 - 0 9   Thyroglobulin Antibody measured by [facility]    Performed at:  [address] [place] [address]  [id number]  : Saturnino Jacinto MD, Phone:  [phone]   [facility] MICROSOM AB 10 IU/mL  0 - 34   Performed at:  [address] [place] [address]  [id number]  : Saturnino Jacinto MD, Phone:  [phone]     (1) CBC/PLT/DIFF 82EWE2282 07:52AM Sukh Payne Order Number: [id number]     Test Name Result Flag Reference   WBC COUNT 4 87 Thousand/uL  4 31-10 16   RBC COUNT 4 24 Million/uL  3 81-5 12   HEMOGLOBIN 12 9 g/dL  11 5-15 4 HEMATOCRIT 39 0 %  34 8-46  1   MCV 92 fL  82-98   MCH 30 4 pg  26 8-34 3   MCHC 33 1 g/dL  31 4-37 4   RDW 12 1 %  11 6-15 1   MPV 10 4 fL  8 9-12 7   PLATELET COUNT 215 Thousands/uL  149-390   nRBC AUTOMATED 0 /100 WBCs     NEUTROPHILS RELATIVE PERCENT 57 %  43-75   LYMPHOCYTES RELATIVE PERCENT 29 %  14-44   MONOCYTES RELATIVE PERCENT 10 %  4-12   EOSINOPHILS RELATIVE PERCENT 3 %  0-6   BASOPHILS RELATIVE PERCENT 1 %  0-1   NEUTROPHILS ABSOLUTE COUNT 2 74 Thousands/? ??L  1 85-7 62   LYMPHOCYTES ABSOLUTE COUNT 1 43 Thousands/? ??L  0 60-4 47   MONOCYTES ABSOLUTE COUNT 0 50 Thousand/? ??L  0 17-1 22   EOSINOPHILS ABSOLUTE COUNT 0 16 Thousand/? ??L  0 00-0 61   BASOPHILS ABSOLUTE COUNT 0 03 Thousands/? ??L  0 00-0 10     (1) COMPREHENSIVE METABOLIC PANEL 13AQN0422 09:97KT Nadeen Sena Order Number: [id number]     Test Name Result Flag Reference   SODIUM 138 mmol/L  136-145   POTASSIUM 3 7 mmol/L  3 5-5 3   CHLORIDE 104 mmol/L  100-108   CARBON DIOXIDE 28 mmol/L  21-32   ANION GAP (CALC) 6 mmol/L  4-13   BLOOD UREA NITROGEN 17 mg/dL  5-25   CREATININE 0 84 mg/dL  0 60-1 30   Standardized to IDMS reference method   CALCIUM 8 6 mg/dL  8 3-10 1   BILI, TOTAL 0 62 mg/dL  0 20-1 00   ALK PHOSPHATAS 53 U/L     ALT (SGPT) 21 U/L  12-78   AST(SGOT) 16 U/L  5-45   ALBUMIN 3 4 g/dL L 3 5-5 0   TOTAL PROTEIN 6 2 g/dL L 6 4-8 2   eGFR Non-African American      >60 0 ml/min/1 73sq m   [facility] Disease Education Program recommendations are as follows:  GFR calculation is accurate only with a steady state creatinine  Chronic Kidney disease less than 60 ml/min/1 73 sq  meters  Kidney failure less than 15 ml/min/1 73 sq  meters     GLUCOSE FASTING 92 mg/dL  65-99     (1) LIPID PANEL, FASTING 15Jun2017 07:52AM Nadeen Sena Order Number: [id number]     Test Name Result Flag Reference   CHOLESTEROL 184 mg/dL     HDL,DIRECT 51 mg/dL  40-60   Specimen collection should occur prior to Metamizole administration due to the potential for falsely depressed results  LDL CHOLESTEROL CALCULATED 111 mg/dL H 0-100   Triglyceride:         Normal              <150 mg/dl       Borderline High    150-199 mg/dl       High               200-499 mg/dl       Very High          >499 mg/dl  Cholesterol:         Desirable        <200 mg/dl      Borderline High  200-239 mg/dl      High             >239 mg/dl  HDL Cholesterol:        High    >59 mg/dL      Low     <41 mg/dL  LDL CALCULATED:    This screening LDL is a calculated result  It does not have the accuracy of the Direct Measured LDL in the monitoring of patients with hyperlipidemia and/or statin therapy  Direct Measure LDL (BKS507) must be ordered separately in these patients  TRIGLYCERIDES 111 mg/dL  <=150   Specimen collection should occur prior to N-Acetylcysteine or Metamizole administration due to the potential for falsely depressed results  (1) TSH 18NZW9896 07:52AM Everton Rod Order Number: [id number]     Test Name Result Flag Reference   TSH 3 840 uIU/mL H 0 358-3 740   Patients undergoing fluorescein dye angiography may retain small amounts of fluorescein in the body for 48-72 hours post procedure  Samples containing fluorescein can produce falsely depressed TSH values  If the patient had this procedure,a specimen should be resubmitted post fluorescein clearance            The recommended reference ranges for TSH during pregnancy are as follows:  First trimester 0 1 to 2 5 uIU/mL  Second trimester  0 2 to 3 0 uIU/mL  Third trimester 0 3 to 3 0 uIU/m     (1) URINALYSIS (will reflex a microscopy if leukocytes, occult blood, protein or nitrites are not within normal limits) [id number] 07:52AM [employer] Order Number: [id number]     Test Name Result Flag Reference   COLOR Yellow     CLARITY Clear     SPECIFIC GRAVITY UA 1 011  1 003-1 030   PH UA 7 0  4 5-8 0   LEUKOCYTE ESTERASE UA Negative  Negative   NITRITE UA Negative  Negative   PROTEIN UA Negative mg/dl  Negative   GLUCOSE UA Negative mg/dl  Negative   KETONES UA Negative mg/dl  Negative   UROBILINOGEN UA 0 2 E U /dl  0 2, 1 0 E U /dl   BILIRUBIN UA Negative  Negative   BLOOD UA Negative  Negative     * MAMMO SCREENING BILATERAL W CAD [id number] 08:02AM Hoda Jmaes Order Number: [id number]    - Patient Instructions: To schedule this appointment, please contact Central Scheduling at [phone]  Do not wear any perfume, powder, lotion or deodorant on breast or underarm area  Please bring your doctors order, referral (if needed) and insurance information with you on the day of the test  Failure to bring this information may result in this test being rescheduled  Arrive 15 minutes prior to your appointment time to register  On the day of your test, please bring any prior mammogram or breast studies with you that were not performed at a [facility]  Failure to bring prior exams may result in your test needing to be rescheduled  Test Name Result Flag Reference   MAMMO SCREENING BILATERAL W CAD (Report)     Patient History:   Patient is postmenopausal    Family history of colorectal cancer in sister at age 80  Took estrogen for 10 years  Patient has never smoked  Patient's BMI is 25 1  Reason for exam: screening (asymptomatic)  Mammo Screening Bilateral W CAD: January 18, 2017 - Check In #:    [de-identified]   Bilateral MLO and CC view(s) were taken  Technologist: RT Pantera(R)(M)   Prior study comparison: February 5, 2015, digital bilateral    screening mammogram performed at [facility]  January 14, 2014, digital bilateral screening mammogram [facility]   [facility]  November 30, 2010, digital    bilateral screening mammogram performed at /[facility]  There are scattered fibroglandular densities       No dominant soft tissue mass, architectural distortion or suspicious calcifications are noted in either breast  The skin    and nipple contours are within normal limits  No evidence of malignancy  No significant changes when compared with prior studies  ASSESSMENT: BiRad:1 - Negative     Recommendation:   Routine screening mammogram of both breasts in 1 year  A    reminder letter will be scheduled  Analyzed by CAD     8-10% of cancers will be missed on mammography  Management of a    palpable abnormality must be based on clinical grounds  Patients   will be notified of their results via letter from our facility  Accredited by [employer] of Radiology and FDA  Transcription Location: WILDA Cantu 98: [id number]     Risk Value(s):   Tyrer-Cuzick 10 Year: 2 149%, Tyrer-Cuzick Lifetime: 2 149%,    Myriad Table: 1 5%, TREVON 5 Year: 2 2%, NCI Lifetime: 4 2%   Signed by:   Dwayne Del Toro MD   1/18/17     (1)  [id number] 11:58AM Brad Delgadillo Order Number: [id number]     Test Name Result Flag Reference   (NEW) 9 5 U/mL  0 0-30 0   [facility]  Results cannot be interpreted as absolute evidence for the presence or the absence of malignant disease  Duplicate testing  This test should be used to re-establish your patient's baseline  (1) THIN PREP PAP WITH IMAGING 51Fkv7559 12:00AM Miguel Willard     Test Name Result Flag Reference   LAB AP CASE REPORT (Report)     Gynecologic Cytology Report            Case: [id number]                  Authorizing Provider: Victor Manuel Walsh MD     Collected:      12/28/2016           First Screen:     JANAE Hoover    Received:      12/29/2016 1025        Specimen:  LIQUID-BASED PAP, SCREENING, Cervix   LAB AP GYN PRIMARY INTERPRETATION      Negative for intraepithelial lesion or malignancy  Electronically signed by JANAE Hoover on 1/4/2017 at 12:26 PM   LAB AP GYN SPECIMEN ADEQUACY      Satisfactory for evaluation   (See note)   LAB AP GYN NOTE      No endocervical cells identified  It is difficult to differentiate between   squamous metaplastic cells and parabasal cells in atrophic specimens due   to numerous causes including menopause, postpartum changes and   progestational agents  LAB AP GYN ADDITIONAL INFORMATION (Report)     [employer]'s FDA approved ,  and ThinPrep Imaging System are   utilized with strict adherence to the 's instruction manual to   prepare gynecologic and non-gynecologic cytology specimens for the   production of ThinPrep slides as well as for gynecologic ThinPrep imaging  These processes have been validated by our laboratory and/or by the     The Pap test is not a diagnostic procedure and should not be used as the   sole means to detect cervical cancer  It is only a screening procedure to   aid in the detection of cervical cancer and its precursors  Both   false-negative and false-positive results have been experienced  Your   patient's test result should be interpreted in this context together with   the history and clinical findings  LAB AP LMP        * US PELVIS COMPLETE [facility] OF [place] AND TRANSVAGINAL) [id number] 07:46AM [employer] [place] Order Number: [id number]    - Patient Instructions: To schedule this appointment, please contact Central Scheduling at [phone]  Test Name Result Flag Reference   US PELVIS COMPLETE (TRANSABDOMINAL AND TRANSVAGINAL) (Report)     PELVIC ULTRASOUND, COMPLETE     INDICATION: Follow-up left ovarian cyst   Patient is presumably postmenopausal          COMPARISON: November 11, 2015     TECHNIQUE:  Transabdominal pelvic ultrasound was performed in sagittal and transverse planes with a curvilinear transducer  Additional transvaginal imaging was performed to better evaluate the endometrium and ovaries  Imaging included volumetric    sweeps as well as traditional still imaging technique       FINDINGS:     UTERUS:   The uterus is anteverted in position, measuring 7 5 x 3 9 x 4 9 cm  Volume = 74 mL, previously 64 mL   Myometrial echotexture is diffusely heterogeneous  No discrete mass   The cervix shows no suspicious abnormality  ENDOMETRIUM:    Normal caliber of 4 mm  Homogenous and normal in appearance  OVARIES/ADNEXA:   Right ovary: Not visualized     Left ovary: 4 0 x 4 2 x 4 3 cm  Simple cyst measuring 3 9 cm, previously 3 8 cm  No suspicious left ovarian abnormality  Doppler flow within normal limits  No suspicious adnexal mass or loculated collections  There is no free fluid  IMPRESSION:   Unremarkable uterus  Normal thickness endometrium  Right ovary not visualized  Relatively stable simple cyst left ovary measuring up to 3 9 cm  Yearly follow-up recommended  ##fuslh12##fuslh12          Workstation performed: [id number]     Signed by: Mariam Ernst,    8/9/16     * Dexa Scan Axial Skel (Hip, Pelvis, Spine) 46ZGW7690 10:18AM Jamarcus Davis     Test Name Result Flag Reference   Dexa Scan (Report)     [facility];[address] [facility];;[place];[id number]  02/05/2015 1030  02/05/2015 1100  N/A    CENTRAL DXA SCAN    CLINICAL HISTORY-  75year old post-menopausal  female  TECHNIQUE- Bone densitometry was performed using a H[employer] [place] C  bone densitometer  Regions of interest appear properly placed  There  are no obvious fractures or other confounding variables which could  limit the study  COMPARISON- 12/11/2012    RESULTS-   LUMBAR SPINE- L1-L4-  BMD 1 083 gm/cm2  T-score 0 3  Z-score 2 8    LEFT TOTAL HIP-  BMD 0 921 gm/cm2  T-score -0 2  Z-score 1 7    LEFT FEMORAL NECK-  BMD 0 752 gm/cm2  T-score -0 9  Z-score 1 2    RIGHT FOREARM -  BMD 0 679 gm/sq-cm,  T-score is -0 1  Z-score is 2 6       ASSESSMENT-  1  Based on the WHO classification, this study is normal and the  patient is considered at low risk for fracture    2  Since the prior study, there has been 2 6 percent decrease in the  BMD of the spine  3  A daily intake of calcium of at least 1200 mg and vitamin D,  800-1000 IU, as well as weight bearing and muscle strengthening  exercise, fall prevention and avoidance of tobacco and excessive  alcohol intake  4  Repeat DXA scan in 18-24 months as clinically indicated  Eunice Diaz CLASSIFICATION-  Normal (a T-score of -1 0 or higher)  Low bone mineral density (a T-score of less than -1 0 but higher than  -2 5)  Osteoporosis (a T-score of -2 5 or less)  Severe osteoporosis (a T-score of -2 5 or less with a fragility  fracture)              Transcribed on- [id number]    - PEDRO Hernandez MD  Reading Radiologist- PEDRO Hernandez MD  Electronically Signed- PEDRO Hernandez MD  Released Date Time- 02/05/15 1214  ------------------------------------------------------------------------------  [id number]^BRIAN Rudd MD  [id number]^BRIAN Rudd MD     EKG/ECG- POC [id number] 11:30AM Percrabia Jessica     Test Name Result Flag Reference   EKG/ECG completed       COLONOSCOPY [id number] 12:00AM Percrabia Lopez     Test Name Result Flag Reference   Colonoscopy      13 See detailed report     Future Appointments    Date/Time Provider Specialty Site   2018 08:00 AM JULIA Cash   Family Medicine [id number]     Signatures   Electronically signed by : JULIA Delaney ; Dec 12 2017 10:15AM EST                       (Author)    Appendix #1     Patient: Cristo Freedman; : 1940; MRN: [id number]      1 2 3 4 5 6    Influenza  10-Sep-2012  (72y) 18-Sep-2013  (73y) 23-Sep-2014  (74y) 2015  (75y) 2016  (76y) [id number]    PCV  2015  (75y)         PPSV  Temporarily Deferred: Patient reports item recently done

## 2018-02-01 ENCOUNTER — OFFICE VISIT (OUTPATIENT)
Dept: OBGYN CLINIC | Facility: CLINIC | Age: 78
End: 2018-02-01
Payer: MEDICARE

## 2018-02-01 VITALS
HEIGHT: 67 IN | DIASTOLIC BLOOD PRESSURE: 82 MMHG | BODY MASS INDEX: 26.18 KG/M2 | WEIGHT: 166.8 LBS | SYSTOLIC BLOOD PRESSURE: 130 MMHG

## 2018-02-01 DIAGNOSIS — Z12.31 VISIT FOR SCREENING MAMMOGRAM: ICD-10-CM

## 2018-02-01 DIAGNOSIS — Z01.419 ENCOUNTER FOR ANNUAL ROUTINE GYNECOLOGICAL EXAMINATION: Primary | ICD-10-CM

## 2018-02-01 PROCEDURE — G0101 CA SCREEN;PELVIC/BREAST EXAM: HCPCS | Performed by: OBSTETRICS & GYNECOLOGY

## 2018-02-01 RX ORDER — VALSARTAN 320 MG/1
1 TABLET ORAL DAILY
COMMUNITY
Start: 2015-06-11 | End: 2018-07-23 | Stop reason: ALTCHOICE

## 2018-02-01 RX ORDER — AMLODIPINE BESYLATE 2.5 MG/1
1 TABLET ORAL DAILY
COMMUNITY
Start: 2015-09-04 | End: 2018-12-26 | Stop reason: SDUPTHER

## 2018-02-01 RX ORDER — DIPHENOXYLATE HYDROCHLORIDE AND ATROPINE SULFATE 2.5; .025 MG/1; MG/1
1 TABLET ORAL
COMMUNITY

## 2018-02-01 RX ORDER — METOPROLOL TARTRATE 50 MG/1
1 TABLET, FILM COATED ORAL 2 TIMES DAILY
COMMUNITY
Start: 2015-08-27 | End: 2018-12-26 | Stop reason: SDUPTHER

## 2018-02-01 RX ORDER — METHENAMINE HIPPURATE 1000 MG/1
1 TABLET ORAL
COMMUNITY

## 2018-02-01 NOTE — PROGRESS NOTES
Assessment/Plan:    No problem-specific Assessment & Plan notes found for this encounter  Diagnoses and all orders for this visit:    Encounter for annual routine gynecological examination    Visit for screening mammogram  -     Mammo screening bilateral w cad    Other orders  -     valsartan (DIOVAN) 320 MG tablet; Take 1 tablet by mouth daily  -     multivitamin (THERAGRAN) TABS; Take 1 tablet by mouth  -     metoprolol tartrate (LOPRESSOR) 50 mg tablet; Take 1 tablet by mouth 2 (two) times a day  -     methenamine hippurate (HIPREX) 1 g tablet; Take 1 g by mouth  -     Cholecalciferol ([employer] VIT D 5000 HIGH-POTENCY) 5000 units capsule; Take by mouth  -     Cholecalciferol 2000 units CAPS; Take 1 capsule by mouth  -     Calcium Carb-Cholecalciferol (CALCIUM 1000 + D) 1000-800 MG-UNIT TABS; Take by mouth  -     aspirin 81 MG tablet; Take 81 mg by mouth  -     amLODIPine (NORVASC) 2 5 mg tablet; Take 1 tablet by mouth daily          Subjective:      Patient ID: Danuta Bass is a 65 y o  female  Patient is a 43-year-old white female who is here today for her annual gynecologic examination  She has been followed yearly with pelvic ultrasound and  for a chronic left ovarian complex cyst which has remained stable each year  She denies any significant pelvic or abdominal pain  She also reports normal bowel and bladder habits except for occasional urinary stress incontinence  She is followed on a regular basis by Dr Stefano Fang from a urologic point of view  Gynecologic Exam         The following portions of the patient's history were reviewed and updated as appropriate: allergies, current medications, past family history, past medical history, past social history, past surgical history and problem list     Review of Systems   Constitutional: Negative  HENT: Negative  Eyes: Negative  Respiratory: Negative  Cardiovascular: Negative           Patient has been followed regularly for hypertension  Gastrointestinal: Negative  Endocrine: Negative  Genitourinary: Negative  Musculoskeletal: Negative  Skin: Negative  Neurological: Negative  Psychiatric/Behavioral: Negative  Objective:     Physical Exam   Constitutional: She appears well-developed  HENT:   Head: Normocephalic  Eyes: Pupils are equal, round, and reactive to light  Neck: Normal range of motion  Neck supple  Cardiovascular: Normal rate and regular rhythm  Pulmonary/Chest: Effort normal and breath sounds normal    Abdominal: Soft  Normal appearance and bowel sounds are normal    Genitourinary: Rectum normal, vagina normal and uterus normal  Pelvic exam was performed with patient supine  Right adnexum displays no mass, no tenderness and no fullness  Left adnexum displays no mass, no tenderness and no fullness  Lymphadenopathy:     She has no cervical adenopathy  She has no axillary adenopathy  Right: No inguinal and no supraclavicular adenopathy present  Left: No inguinal and no supraclavicular adenopathy present  Neurological: She is alert  Skin: Skin is intact  No rash noted  Psychiatric: She has a normal mood and affect   Her speech is normal and behavior is normal  Judgment and thought content normal  Cognition and memory are normal

## 2018-02-01 NOTE — PATIENT INSTRUCTIONS
Today's visit provided a normal gynecologic examination  Self-breast examination was stressed to the patient  Mammogram was ordered  Pap smear was not needed this year  Patient will be seen in 1 year for her annual examination  She was told to call the office should any problems develop during the interim

## 2018-04-03 ENCOUNTER — TRANSCRIBE ORDERS (OUTPATIENT)
Dept: RADIOLOGY | Facility: CLINIC | Age: 78
End: 2018-04-03

## 2018-04-05 ENCOUNTER — HOSPITAL ENCOUNTER (OUTPATIENT)
Dept: RADIOLOGY | Age: 78
Discharge: HOME | End: 2018-04-05
Payer: MEDICARE

## 2018-04-05 PROCEDURE — 77067 SCR MAMMO BI INCL CAD: CPT

## 2018-04-24 ENCOUNTER — APPOINTMENT (OUTPATIENT)
Dept: LAB | Age: 78
End: 2018-04-24
Payer: MEDICARE

## 2018-04-24 ENCOUNTER — TRANSCRIBE ORDERS (OUTPATIENT)
Dept: ADMINISTRATIVE | Age: 78
End: 2018-04-24

## 2018-04-24 DIAGNOSIS — N39.0 URINARY TRACT INFECTION WITHOUT HEMATURIA, SITE UNSPECIFIED: ICD-10-CM

## 2018-04-24 DIAGNOSIS — N39.0 URINARY TRACT INFECTION WITHOUT HEMATURIA, SITE UNSPECIFIED: Primary | ICD-10-CM

## 2018-04-24 LAB
BUN SERPL-MCNC: 19 MG/DL (ref 5–25)
CREAT SERPL-MCNC: 0.89 MG/DL (ref 0.6–1.3)
ERYTHROCYTE [DISTWIDTH] IN BLOOD BY AUTOMATED COUNT: 12.3 % (ref 11.6–15.1)
GFR SERPL CREATININE-BSD FRML MDRD: 62 ML/MIN/1.73SQ M
HCT VFR BLD AUTO: 39.8 % (ref 34.8–46.1)
HGB BLD-MCNC: 13.1 G/DL (ref 11.5–15.4)
MCH RBC QN AUTO: 30.4 PG (ref 26.8–34.3)
MCHC RBC AUTO-ENTMCNC: 32.9 G/DL (ref 31.4–37.4)
MCV RBC AUTO: 92 FL (ref 82–98)
PLATELET # BLD AUTO: 255 THOUSANDS/UL (ref 149–390)
PMV BLD AUTO: 10.5 FL (ref 8.9–12.7)
RBC # BLD AUTO: 4.31 MILLION/UL (ref 3.81–5.12)
WBC # BLD AUTO: 5.27 THOUSAND/UL (ref 4.31–10.16)

## 2018-04-24 PROCEDURE — 84520 ASSAY OF UREA NITROGEN: CPT

## 2018-04-24 PROCEDURE — 36415 COLL VENOUS BLD VENIPUNCTURE: CPT

## 2018-04-24 PROCEDURE — 82565 ASSAY OF CREATININE: CPT

## 2018-04-24 PROCEDURE — 85027 COMPLETE CBC AUTOMATED: CPT

## 2018-05-14 DIAGNOSIS — I10 ESSENTIAL (PRIMARY) HYPERTENSION: ICD-10-CM

## 2018-05-14 DIAGNOSIS — Z00.00 ENCOUNTER FOR GENERAL ADULT MEDICAL EXAMINATION WITHOUT ABNORMAL FINDINGS: ICD-10-CM

## 2018-05-14 DIAGNOSIS — N83.202 CYST OF LEFT OVARY: ICD-10-CM

## 2018-05-14 DIAGNOSIS — E78.5 HYPERLIPIDEMIA: ICD-10-CM

## 2018-05-14 DIAGNOSIS — E03.9 HYPOTHYROIDISM: ICD-10-CM

## 2018-05-14 DIAGNOSIS — M85.80 OTHER SPECIFIED DISORDERS OF BONE DENSITY AND STRUCTURE, UNSPECIFIED SITE (CODE): ICD-10-CM

## 2018-05-24 ENCOUNTER — APPOINTMENT (OUTPATIENT)
Dept: LAB | Age: 78
End: 2018-05-24
Payer: MEDICARE

## 2018-05-24 ENCOUNTER — TRANSCRIBE ORDERS (OUTPATIENT)
Dept: ADMINISTRATIVE | Age: 78
End: 2018-05-24

## 2018-05-24 DIAGNOSIS — I10 ESSENTIAL (PRIMARY) HYPERTENSION: ICD-10-CM

## 2018-05-24 DIAGNOSIS — Z00.00 ENCOUNTER FOR GENERAL ADULT MEDICAL EXAMINATION WITHOUT ABNORMAL FINDINGS: ICD-10-CM

## 2018-05-24 DIAGNOSIS — E78.5 HYPERLIPIDEMIA: ICD-10-CM

## 2018-05-24 DIAGNOSIS — M85.80 OTHER SPECIFIED DISORDERS OF BONE DENSITY AND STRUCTURE, UNSPECIFIED SITE (CODE): ICD-10-CM

## 2018-05-24 DIAGNOSIS — N83.202 CYST OF LEFT OVARY: ICD-10-CM

## 2018-05-24 DIAGNOSIS — E03.9 HYPOTHYROIDISM: ICD-10-CM

## 2018-05-24 LAB
25(OH)D3 SERPL-MCNC: 69 NG/ML (ref 30–100)
ALBUMIN SERPL BCP-MCNC: 3.4 G/DL (ref 3.5–5)
ALP SERPL-CCNC: 57 U/L (ref 46–116)
ALT SERPL W P-5'-P-CCNC: 23 U/L (ref 12–78)
ANION GAP SERPL CALCULATED.3IONS-SCNC: 3 MMOL/L (ref 4–13)
AST SERPL W P-5'-P-CCNC: 18 U/L (ref 5–45)
BASOPHILS # BLD AUTO: 0.03 THOUSANDS/ΜL (ref 0–0.1)
BASOPHILS NFR BLD AUTO: 1 % (ref 0–1)
BILIRUB SERPL-MCNC: 0.68 MG/DL (ref 0.2–1)
BUN SERPL-MCNC: 14 MG/DL (ref 5–25)
CALCIUM SERPL-MCNC: 8.4 MG/DL (ref 8.3–10.1)
CHLORIDE SERPL-SCNC: 104 MMOL/L (ref 100–108)
CHOLEST SERPL-MCNC: 186 MG/DL (ref 50–200)
CO2 SERPL-SCNC: 29 MMOL/L (ref 21–32)
CREAT SERPL-MCNC: 0.83 MG/DL (ref 0.6–1.3)
EOSINOPHIL # BLD AUTO: 0.15 THOUSAND/ΜL (ref 0–0.61)
EOSINOPHIL NFR BLD AUTO: 4 % (ref 0–6)
ERYTHROCYTE [DISTWIDTH] IN BLOOD BY AUTOMATED COUNT: 12.1 % (ref 11.6–15.1)
GFR SERPL CREATININE-BSD FRML MDRD: 67 ML/MIN/1.73SQ M
GLUCOSE P FAST SERPL-MCNC: 96 MG/DL (ref 65–99)
HCT VFR BLD AUTO: 39.6 % (ref 34.8–46.1)
HDLC SERPL-MCNC: 55 MG/DL (ref 40–60)
HGB BLD-MCNC: 13 G/DL (ref 11.5–15.4)
LDLC SERPL CALC-MCNC: 115 MG/DL (ref 0–100)
LYMPHOCYTES # BLD AUTO: 1.37 THOUSANDS/ΜL (ref 0.6–4.47)
LYMPHOCYTES NFR BLD AUTO: 35 % (ref 14–44)
MCH RBC QN AUTO: 30.1 PG (ref 26.8–34.3)
MCHC RBC AUTO-ENTMCNC: 32.8 G/DL (ref 31.4–37.4)
MCV RBC AUTO: 92 FL (ref 82–98)
MONOCYTES # BLD AUTO: 0.45 THOUSAND/ΜL (ref 0.17–1.22)
MONOCYTES NFR BLD AUTO: 12 % (ref 4–12)
NEUTROPHILS # BLD AUTO: 1.9 THOUSANDS/ΜL (ref 1.85–7.62)
NEUTS SEG NFR BLD AUTO: 49 % (ref 43–75)
NONHDLC SERPL-MCNC: 131 MG/DL
NRBC BLD AUTO-RTO: 0 /100 WBCS
PLATELET # BLD AUTO: 255 THOUSANDS/UL (ref 149–390)
PMV BLD AUTO: 10.3 FL (ref 8.9–12.7)
POTASSIUM SERPL-SCNC: 3.5 MMOL/L (ref 3.5–5.3)
PROT SERPL-MCNC: 6.5 G/DL (ref 6.4–8.2)
RBC # BLD AUTO: 4.32 MILLION/UL (ref 3.81–5.12)
SODIUM SERPL-SCNC: 136 MMOL/L (ref 136–145)
TRIGL SERPL-MCNC: 81 MG/DL
TSH SERPL DL<=0.05 MIU/L-ACNC: 2.83 UIU/ML (ref 0.36–3.74)
WBC # BLD AUTO: 3.9 THOUSAND/UL (ref 4.31–10.16)

## 2018-05-24 PROCEDURE — 80061 LIPID PANEL: CPT

## 2018-05-24 PROCEDURE — 82306 VITAMIN D 25 HYDROXY: CPT

## 2018-05-24 PROCEDURE — 80053 COMPREHEN METABOLIC PANEL: CPT

## 2018-05-24 PROCEDURE — 84443 ASSAY THYROID STIM HORMONE: CPT

## 2018-05-24 PROCEDURE — 36415 COLL VENOUS BLD VENIPUNCTURE: CPT

## 2018-05-24 PROCEDURE — 85025 COMPLETE CBC W/AUTO DIFF WBC: CPT

## 2018-06-12 ENCOUNTER — OFFICE VISIT (OUTPATIENT)
Dept: FAMILY MEDICINE CLINIC | Facility: CLINIC | Age: 78
End: 2018-06-12
Payer: MEDICARE

## 2018-06-12 VITALS
WEIGHT: 168.9 LBS | RESPIRATION RATE: 16 BRPM | BODY MASS INDEX: 26.51 KG/M2 | TEMPERATURE: 97.1 F | HEART RATE: 58 BPM | HEIGHT: 67 IN | DIASTOLIC BLOOD PRESSURE: 64 MMHG | SYSTOLIC BLOOD PRESSURE: 132 MMHG

## 2018-06-12 DIAGNOSIS — I10 ESSENTIAL HYPERTENSION: ICD-10-CM

## 2018-06-12 DIAGNOSIS — E78.00 PURE HYPERCHOLESTEROLEMIA: ICD-10-CM

## 2018-06-12 DIAGNOSIS — Z00.00 MEDICARE ANNUAL WELLNESS VISIT, SUBSEQUENT: Primary | ICD-10-CM

## 2018-06-12 DIAGNOSIS — D72.819 LEUKOPENIA, UNSPECIFIED TYPE: ICD-10-CM

## 2018-06-12 DIAGNOSIS — R20.2 PARESTHESIA OF FOOT, BILATERAL: ICD-10-CM

## 2018-06-12 PROCEDURE — G0439 PPPS, SUBSEQ VISIT: HCPCS | Performed by: FAMILY MEDICINE

## 2018-06-12 PROCEDURE — 99214 OFFICE O/P EST MOD 30 MIN: CPT | Performed by: FAMILY MEDICINE

## 2018-06-12 NOTE — PROGRESS NOTES
Assessment/Plan:     Diagnoses and all orders for this visit:    Medicare annual wellness visit, subsequent    Essential hypertension    Pure hypercholesterolemia    Leukopenia, unspecified type  -     CBC and differential  -     Vitamin B12  -     Folate    Paresthesia of foot, bilateral        Repeat CBC this month  I included B 12 and folate  She is not interested in additional testing at this time for paresthesias in feet  I discussed repeating a stress test  She declined at this time  Office visit 6 months      Patient ID: Yoly Kilgore is a 77 y o  female  Followup visit  medications reviewed  labs 05/2018 see note  hypertension BPs have been stable on current 3 drug regimen  creatinine 0 83  electrolytes normal  K+ 3 5   lipid profile cholesterol 186  TGs 81  HDL 55    FBS 96  she is currently on Amlodipine 2 5 mg daily, Valsartan 320 mg daily and Metoprolol 50 mg BID  she had developed hyponatremia and hypokalemia on HCTZ         The following portions of the patient's history were reviewed and updated as appropriate: allergies, current medications, past family history, past medical history, past social history, past surgical history and problem list     Review of Systems   Constitutional: Negative for appetite change, chills, fatigue, fever and unexpected weight change  HENT: Negative for congestion, ear pain, hearing loss, postnasal drip, rhinorrhea and trouble swallowing  Eyes: Negative for visual disturbance  Respiratory: Negative for cough, shortness of breath and wheezing  Cardiovascular: Negative for chest pain, palpitations and leg swelling  Intermittent episodes of mid epigastric and substernal discomfort  Not related to exertion  No symptoms after eating  No reflux  No dysphagia  Symptoms alleviated after a BM  admission 05/2014 at [facility] with left jaw and arm pain  Normal EKG  she ruled out for MI   Nuclear stress test prior to discharge normal  Gastrointestinal: Negative for abdominal pain, blood in stool, constipation, diarrhea, nausea and vomiting  Chronic constipation colonoscopy 11/2013  + FH colon CA sister  Endocrine:         borderline abnormal TSH 3 840 06/2017  repeat TSH 07/2017 2 630  thyroid ABs negative  05/2018 TSH 2 830   02/2015 DEXA scan normal    Genitourinary: Negative for difficulty urinating  No UI  history of recurrent UTIs followed by urology  pelvic u/s 08/2016 stable simple left ovarian cyst  01/2017  normal at 9 5 followed by GYN ,    Musculoskeletal: Negative for arthralgias and myalgias  Prior orthopedic evaluation for right knee pain including MRI  meniscal tear  no improvement with steroid injection  she had a 2nd opinion  not a candidate for TKR  right knee x rays OA  no pain at present,   Skin: Negative for rash  Neurological: Positive for numbness  Negative for dizziness, weakness and headaches  Intermittent numbness/burning in feet usually at HS  No leg pains or weakness  Chronic intermittent lower back pain  Hematological: Negative for adenopathy  Does not bruise/bleed easily  05/2018 CBC WBC 3,900  Hgb 13  Platelets 482,611   30/2298 CBC WBC 5270  Hemoglobin 13 1  Platelet count [id number]   06/2017 CBC WBC 4,870  Hemoglobin 12 9  Platelet count 315,421   Psychiatric/Behavioral: Negative for dysphoric mood and sleep disturbance  Objective:      /64   Pulse 58   Temp (!) 97 1 °F (36 2 °C)   Resp 16   Ht 5' 7" (1 702 m)   Wt 76 6 kg (168 lb 14 4 oz)   BMI 26 45 kg/m²          Physical Exam   Constitutional: She is oriented to person, place, and time  She appears well-developed and well-nourished  HENT:   Mouth/Throat: Oropharynx is clear and moist    Eyes: Conjunctivae and EOM are normal  Pupils are equal, round, and reactive to light  No scleral icterus  Neck: No JVD present  No thyromegaly present     Cardiovascular: Normal rate, regular rhythm, normal heart sounds and intact distal pulses  Exam reveals no gallop  No murmur heard  Pulmonary/Chest: Effort normal and breath sounds normal  No respiratory distress  She has no wheezes  She has no rales  Abdominal: Soft  Bowel sounds are normal  She exhibits no distension and no mass  There is no tenderness  There is no rebound and no guarding  Musculoskeletal: Normal range of motion  She exhibits no edema  Negative straight leg raise test bilaterally   Lymphadenopathy:     She has no cervical adenopathy  Neurological: She is alert and oriented to person, place, and time  She has normal strength  No cranial nerve deficit or sensory deficit  Gait normal    Reflex Scores:       Patellar reflexes are 1+ on the right side and 1+ on the left side  Achilles reflexes are 0 on the right side and 0 on the left side  No ankle clonus  Skin: No rash noted  Psychiatric: She has a normal mood and affect  Nursing note and vitals reviewed        Recent Results (from the past 504 hour(s))   CBC and differential    Collection Time: 05/24/18  8:54 AM   Result Value Ref Range    WBC 3 90 (L) 4 31 - 10 16 Thousand/uL    RBC 4 32 3 81 - 5 12 Million/uL    Hemoglobin 13 0 11 5 - 15 4 g/dL    Hematocrit 39 6 34 8 - 46 1 %    MCV 92 82 - 98 fL    MCH 30 1 26 8 - 34 3 pg    MCHC 32 8 31 4 - 37 4 g/dL    RDW 12 1 11 6 - 15 1 %    MPV 10 3 8 9 - 12 7 fL    Platelets 852 036 - 611 Thousands/uL    nRBC 0 /100 WBCs    Neutrophils Relative 49 43 - 75 %    Lymphocytes Relative 35 14 - 44 %    Monocytes Relative 12 4 - 12 %    Eosinophils Relative 4 0 - 6 %    Basophils Relative 1 0 - 1 %    Neutrophils Absolute 1 90 1 85 - 7 62 Thousands/µL    Lymphocytes Absolute 1 37 0 60 - 4 47 Thousands/µL    Monocytes Absolute 0 45 0 17 - 1 22 Thousand/µL    Eosinophils Absolute 0 15 0 00 - 0 61 Thousand/µL    Basophils Absolute 0 03 0 00 - 0 10 Thousands/µL   Comprehensive metabolic panel    Collection Time: 05/24/18  8:54 AM Result Value Ref Range    Sodium 136 136 - 145 mmol/L    Potassium 3 5 3 5 - 5 3 mmol/L    Chloride 104 100 - 108 mmol/L    CO2 29 21 - 32 mmol/L    Anion Gap 3 (L) 4 - 13 mmol/L    BUN 14 5 - 25 mg/dL    Creatinine 0 83 0 60 - 1 30 mg/dL    Glucose, Fasting 96 65 - 99 mg/dL    Calcium 8 4 8 3 - 10 1 mg/dL    AST 18 5 - 45 U/L    ALT 23 12 - 78 U/L    Alkaline Phosphatase 57 46 - 116 U/L    Total Protein 6 5 6 4 - 8 2 g/dL    Albumin 3 4 (L) 3 5 - 5 0 g/dL    Total Bilirubin 0 68 0 20 - 1 00 mg/dL    eGFR 67 ml/min/1 73sq m   Lipid panel    Collection Time: 05/24/18  8:54 AM   Result Value Ref Range    Cholesterol 186 50 - 200 mg/dL    Triglycerides 81 <=150 mg/dL    HDL, Direct 55 40 - 60 mg/dL    LDL Calculated 115 (H) 0 - 100 mg/dL    Non-HDL-Chol (CHOL-HDL) 131 mg/dl   TSH, 3rd generation with T4 reflex    Collection Time: 05/24/18  8:54 AM   Result Value Ref Range    TSH 3RD GENERATON 2 830 0 358 - 3 740 uIU/mL   Vitamin D 25 hydroxy    Collection Time: 05/24/18  8:54 AM   Result Value Ref Range    Vit D, 25-Hydroxy 69 0 30 0 - 100 0 ng/mL

## 2018-06-12 NOTE — PROGRESS NOTES
Assessment and Plan:    Problem List Items Addressed This Visit     Hyperlipidemia    Hypertension      Other Visit Diagnoses     Medicare annual wellness visit, subsequent    -  Primary    Leukopenia, unspecified type        Relevant Orders    CBC and differential    Vitamin B12    Folate    Paresthesia of foot, bilateral            Health Maintenance Due   Topic Date Due    DTaP,Tdap,and Td Vaccines (1 - Tdap) 12/30/2010    GLAUCOMA SCREENING 67+ YR  04/01/2015     Health Maintenance   Topic Date Due    DTaP,Tdap,and Td Vaccines (1 - Tdap) 12/30/2010    GLAUCOMA SCREENING 67+ YR  04/01/2015    INFLUENZA VACCINE  09/01/2018    CRC Screening: Colonoscopy  11/07/2018    Fall Risk  06/12/2019    Depression Screening PHQ-9  06/12/2019    Urinary Incontinence Screening  06/12/2019    PNEUMOCOCCAL POLYSACCHARIDE VACCINE AGE 72 AND OVER  Addressed       HPI:  Jayjay Calderón is a 77 y o  female here for her Subsequent Wellness Visit      Patient Active Problem List   Diagnosis    Cyst of left ovary    Diverticulosis    Hyperlipidemia    Hypertension    Osteoarthritis of knee    Osteopenia    Post-menopausal bleeding     Past Medical History:   Diagnosis Date    Accelerated essential hypertension     last assessed 08/17/2015    Depression with anxiety     last assesed 08/16/2012     Past Surgical History:   Procedure Laterality Date    BLEPHAROPLASTY      upper lid w/excessive skin    CARDIOVASCULAR STRESS TEST      onset June 2005    DILATION AND CURETTAGE, DIAGNOSTIC / THERAPEUTIC      ENDOMETRIAL BIOPSY      negative for dysplasia, hyperplasia and malignancy, resolved 03/25/2013    TUBAL LIGATION       Family History   Problem Relation Age of Onset    Heart disease Father      cardiac disorder    Skin cancer Father     Colon cancer Sister     Heart disease Family     Hypertension Family     Coronary artery disease Family      History   Smoking Status    Never Smoker   Smokeless Tobacco    Never Used     History   Alcohol Use    Yes     Comment: rare      History   Drug Use No       Current Outpatient Prescriptions   Medication Sig Dispense Refill    amLODIPine (NORVASC) 2 5 mg tablet Take 1 tablet by mouth daily      aspirin 81 MG tablet Take 81 mg by mouth      Calcium Carb-Cholecalciferol (CALCIUM 1000 + D) 1000-800 MG-UNIT TABS Take by mouth      Cholecalciferol 2000 units CAPS Take 1 capsule by mouth      methenamine hippurate (HIPREX) 1 g tablet Take 1 g by mouth      metoprolol tartrate (LOPRESSOR) 50 mg tablet Take 1 tablet by mouth 2 (two) times a day      multivitamin (THERAGRAN) TABS Take 1 tablet by mouth      valsartan (DIOVAN) 320 MG tablet Take 1 tablet by mouth daily       No current facility-administered medications for this visit        Allergies   Allergen Reactions    Penicillins Other (See Comments)     Immunization History   Administered Date(s) Administered    Influenza Split High Dose Preservative Free IM 09/18/2013, 09/23/2014, 11/17/2015, 11/30/2016, 12/12/2017    Influenza TIV (IM) 1940, 09/10/2012    Pneumococcal Conjugate 13-Valent 11/17/2015    Td (adult), adsorbed 12/29/2010       Patient Care Team:  Binta Shipley MD as PCP - Chata Durbin MD      Medicare Screening Tests and Risk Assessments:  AWV Clinical     ISAR:   Previous hospitalizations?:  Yes       Once in a Lifetime Medicare Screening:   EKG performed?:  Yes    AAA screening performed? (if performed, please add date to Health Maintenance):  No       Medicare Screening Tests and Risk Assessment:   AAA Risk Assessment    None Indicated:  Yes    Osteoporosis Risk Assessment     Female:  Yes   :  Yes    Age over 48:  Yes    HIV Risk Assessment    None indicated:  Yes        Drug and Alcohol Use:   Tobacco use    Cigarettes:  never smoker    Tobacco use duration    Tobacco Cessation Readiness    Alcohol use    Alcohol use:  rare use    Alcohol Treatment Readiness   Illicit Drug Use    Drug use:  never        Diet & Exercise:   Diet   What is your diet?:  Regular   How many servings a day of the following:   Fruits and Vegetables:  3-4    Dairy:  3     Tea:  1   Exercise    Do you currently exercise?:  yes    Frequency:  daily    Type of exercise:  walking   stretching, strength training        Cognitive Impairment Screening:   Depression screening preformed:  Yes     PHQ-9 Depression scale score:  1   Cognitive Impairment Screening    Do you have difficulty learning or retaining new information?:  No Do you have difficulty handling new tasks?:  No   Do you have difficulty with reasoning?:  No Do you have difficulty with spatial ability and orientation?:  No   Do you have difficulty with language?:  No Do you have difficulty with behavior?:  No       Functional Ability/Level of Safety:   Hearing    Hearing difficulties:  No    Hearing Impairment Assessment    Current Activities    Status:  unlimited ADL's, unlimited driving, unlimited IADL's, unlimited social activities   Help needed with the folllowing:    ADL    Fall Risk   Have you fallen in the last 12 months?:  No    Injury History       Home Safety:   Are there hazards in your environment?:  No   Home Safety Risk Factors       Advanced Directives:   Advanced Directives    Living Will:  No Durable POA for healthcare:   Yes   Advanced directive:  No    Patient's End of Life Decisions    Reviewed with patient:  No Provider agrees with end of life decisions:  No       Urinary Incontinence:   Do you have urinary incontinence?:  No        Glaucoma:            Provider Screening     Preventative Screening/Counseling:   Cardiovascular Screening/Counseling:   (Labs Q5 years, EKG optional one-time)   General:  Screening Not Indicated Counseling:  Healthy Diet          Diabetes Screening/Counseling:   (2 tests/year if Pre-Diabetes or 1 test/year if no Diabetes)   General:  Screening Current           Colorectal Cancer Screening/Counseling: (FOBT Q1 yr; Flex Sig Q4 yrs or Q10 yrs after Screening Colonoscopy; Screening Colonoscpy Q2 yrs High Risk or Q10 yrs Low Risk; Barium Enema Q2 yrs High Risk or Q4 yrs Low Risk)   General:  Screening Current           Prostate Cancer Screening/Counseling:   (Annual)          Breast Cancer Screening/Counseling:   (Baseline Age 28 - 43; Annual Age 36+)   General:  Screening Current          Cervical Cancer Screening/Counseling:   (Annual for High Risk or Childbearing Age with Abnormal Pap in Last 3 yrs; Every 2 all others)   General:  Screening Not Indicated           Osteoporosis Screening/Counseling:   (Every 2 Yrs if at risk or more if medically necessary)   General:  Screening Current           AAA Screening/Counseling:   (Once per Lifetime with risk factors)    General:  Screening Not Indicated           Glaucoma Screening/Counseling:   (Annual)   General:  Screening Current          HIV Screening/Counseling:   (Voluntary; Once annually for high risk OR 3 times for Pregnancy at diagnosis of IUP; 3rd trimester; and at Labor   General:  Screening Not Indicated           Hepatitis C Screening:             Immunizations:   Influenza (annual): Influenza UTD This Year   Pneumococcal (Once in a Lifetime):  Lifetime Vaccine Completed   Zostavax (Medicare D Coverage, Pt >72 yo):  Risks & Benefits Discussed   Tdap (Non-Medicare Wellness Visit required):   Tdap Vaccine UTD       Other Preventative Couseling (Non-Medicare Wellness Visit Required):   nutrition counseling performed, weight reduction was discussed       Referrals (Non-Medicare Wellness Visit Required):   Gynecology       Medical Equipment/Suppliers:

## 2018-06-29 ENCOUNTER — APPOINTMENT (OUTPATIENT)
Dept: LAB | Age: 78
End: 2018-06-29
Payer: MEDICARE

## 2018-06-29 ENCOUNTER — TRANSCRIBE ORDERS (OUTPATIENT)
Dept: ADMINISTRATIVE | Age: 78
End: 2018-06-29

## 2018-06-29 DIAGNOSIS — D70.9 NEUTROPENIA, UNSPECIFIED TYPE (HCC): Primary | ICD-10-CM

## 2018-06-29 DIAGNOSIS — D70.9 NEUTROPENIA, UNSPECIFIED TYPE (HCC): ICD-10-CM

## 2018-06-29 LAB
BASOPHILS # BLD AUTO: 0.04 THOUSANDS/ΜL (ref 0–0.1)
BASOPHILS NFR BLD AUTO: 1 % (ref 0–1)
EOSINOPHIL # BLD AUTO: 0.14 THOUSAND/ΜL (ref 0–0.61)
EOSINOPHIL NFR BLD AUTO: 2 % (ref 0–6)
ERYTHROCYTE [DISTWIDTH] IN BLOOD BY AUTOMATED COUNT: 11.9 % (ref 11.6–15.1)
FOLATE SERPL-MCNC: >20 NG/ML (ref 3.1–17.5)
HCT VFR BLD AUTO: 40.4 % (ref 34.8–46.1)
HGB BLD-MCNC: 13 G/DL (ref 11.5–15.4)
IMM GRANULOCYTES # BLD AUTO: 0 THOUSAND/UL (ref 0–0.2)
IMM GRANULOCYTES NFR BLD AUTO: 0 % (ref 0–2)
LYMPHOCYTES # BLD AUTO: 1.82 THOUSANDS/ΜL (ref 0.6–4.47)
LYMPHOCYTES NFR BLD AUTO: 31 % (ref 14–44)
MCH RBC QN AUTO: 30.4 PG (ref 26.8–34.3)
MCHC RBC AUTO-ENTMCNC: 32.2 G/DL (ref 31.4–37.4)
MCV RBC AUTO: 95 FL (ref 82–98)
MONOCYTES # BLD AUTO: 0.71 THOUSAND/ΜL (ref 0.17–1.22)
MONOCYTES NFR BLD AUTO: 12 % (ref 4–12)
NEUTROPHILS # BLD AUTO: 3.08 THOUSANDS/ΜL (ref 1.85–7.62)
NEUTS SEG NFR BLD AUTO: 54 % (ref 43–75)
NRBC BLD AUTO-RTO: 0 /100 WBCS
PLATELET # BLD AUTO: 247 THOUSANDS/UL (ref 149–390)
PMV BLD AUTO: 10.5 FL (ref 8.9–12.7)
RBC # BLD AUTO: 4.27 MILLION/UL (ref 3.81–5.12)
VIT B12 SERPL-MCNC: 589 PG/ML (ref 100–900)
WBC # BLD AUTO: 5.79 THOUSAND/UL (ref 4.31–10.16)

## 2018-06-29 PROCEDURE — 85025 COMPLETE CBC W/AUTO DIFF WBC: CPT

## 2018-06-29 PROCEDURE — 36415 COLL VENOUS BLD VENIPUNCTURE: CPT

## 2018-06-29 PROCEDURE — 82607 VITAMIN B-12: CPT

## 2018-06-29 PROCEDURE — 82746 ASSAY OF FOLIC ACID SERUM: CPT

## 2018-07-23 ENCOUNTER — TELEPHONE (OUTPATIENT)
Dept: FAMILY MEDICINE CLINIC | Facility: CLINIC | Age: 78
End: 2018-07-23

## 2018-07-23 DIAGNOSIS — I10 ESSENTIAL HYPERTENSION: Primary | ICD-10-CM

## 2018-07-23 RX ORDER — IRBESARTAN 300 MG/1
300 TABLET ORAL
Qty: 90 TABLET | Refills: 3 | Status: SHIPPED | OUTPATIENT
Start: 2018-07-23 | End: 2018-07-25 | Stop reason: SDUPTHER

## 2018-07-23 NOTE — TELEPHONE ENCOUNTER
Patient called  Her Valsartan 320 MG has been recalled   [employer] mail order said they would cover either Losartan or Irbesartan   Please advise  She needs this sent to mail order pharmacy ASAP

## 2018-07-25 DIAGNOSIS — I10 ESSENTIAL HYPERTENSION: ICD-10-CM

## 2018-07-25 RX ORDER — IRBESARTAN 300 MG/1
300 TABLET ORAL
Qty: 10 TABLET | Refills: 1 | Status: SHIPPED | OUTPATIENT
Start: 2018-07-25 | End: 2018-12-26 | Stop reason: SDUPTHER

## 2018-07-25 RX ORDER — IRBESARTAN 300 MG/1
300 TABLET ORAL
Qty: 10 TABLET | Refills: 0 | Status: CANCELLED | OUTPATIENT
Start: 2018-07-25 | End: 2018-10-23

## 2018-12-20 ENCOUNTER — OFFICE VISIT (OUTPATIENT)
Dept: FAMILY MEDICINE CLINIC | Facility: CLINIC | Age: 78
End: 2018-12-20
Payer: MEDICARE

## 2018-12-20 VITALS
RESPIRATION RATE: 16 BRPM | TEMPERATURE: 96.8 F | BODY MASS INDEX: 26.84 KG/M2 | HEART RATE: 64 BPM | HEIGHT: 67 IN | DIASTOLIC BLOOD PRESSURE: 80 MMHG | SYSTOLIC BLOOD PRESSURE: 142 MMHG | WEIGHT: 171 LBS

## 2018-12-20 DIAGNOSIS — N83.202 CYST OF LEFT OVARY: ICD-10-CM

## 2018-12-20 DIAGNOSIS — I10 ESSENTIAL HYPERTENSION: Primary | ICD-10-CM

## 2018-12-20 DIAGNOSIS — E78.00 PURE HYPERCHOLESTEROLEMIA: ICD-10-CM

## 2018-12-20 PROCEDURE — 99214 OFFICE O/P EST MOD 30 MIN: CPT | Performed by: FAMILY MEDICINE

## 2018-12-20 NOTE — PROGRESS NOTES
Assessment/Plan:         Diagnoses and all orders for this visit:    Essential hypertension  -     CBC and differential  -     Comprehensive metabolic panel    Pure hypercholesterolemia  -     Lipid panel  -     TSH, 3rd generation with Free T4 reflex    Cyst of left ovary        Continue with current medications  OV 6 month with repeat labs at at that time  Up to date with flu vaccine  Follow up visit with GYN         Patient ID: David Vyas is a 77 y o  female  Followup visit  medications reviewed  labs 05/2018 see note  hypertension BPs have been stable on current 3 drug regimen  on Amlodipine 2 5 mg daily, Valsartan 320 mg daily and Metoprolol 50 mg BID  Blood pressures at home 130/70 range  hyponatremia and hypokalemia on HCTZ  creatinine 0 83  electrolytes normal  K+ 3 5   lipid profile cholesterol 186  TGs 81  HDL 55    FBS 96  The following portions of the patient's history were reviewed and updated as appropriate: allergies, current medications, past family history, past medical history, past social history, past surgical history and problem list     Review of Systems   Constitutional: Negative for appetite change, chills, fatigue, fever and unexpected weight change  HENT: Negative for congestion, ear pain, postnasal drip, rhinorrhea, sore throat and trouble swallowing  Eyes: Negative for visual disturbance  Respiratory: Negative for cough, shortness of breath and wheezing  Cardiovascular: Negative for chest pain, palpitations and leg swelling  admission 05/2014 at [address] with left jaw and arm pain  Normal EKG  she ruled out for MI  Nuclear stress test prior to discharge normal    Gastrointestinal: Negative for abdominal pain, blood in stool, constipation, diarrhea, nausea and vomiting  Chronic constipation colonoscopy 11/2013  + FH colon CA sister  Endocrine:         borderline abnormal TSH 3 840 06/2017  repeat TSH 07/2017 2 630   thyroid ABs negative  05/2018 TSH 2 830   02/2015 DEXA scan normal    Genitourinary: Negative for difficulty urinating  No UI  history of recurrent UTIs followed by urology  pelvic u/s 08/2016 stable simple left ovarian cyst  01/2017  normal at 9 5 followed by GYN ,    Musculoskeletal: Positive for arthralgias and back pain  Negative for myalgias  Prior orthopedic evaluation for right knee pain including MRI  meniscal tear  no improvement with steroid injection  she had a 2nd opinion  not a candidate for TKR  right knee x rays OA  no pain at present, Chronic intermittent lower back pain  Skin: Negative for rash  Neurological: Negative for dizziness and headaches  Chronic intermittent lower back pain  Hematological: Negative for adenopathy  Does not bruise/bleed easily  History of mild leukopenia  06/2018 CBC WBC 5790  Hemoglobin 13  MCV 95  Platelet count 383032  Vitamin B12 589  Folate greater than 20   05/2018 CBC WBC 3,900  Hgb 13  Platelets 643,560   42/8823 CBC WBC 5270  Hemoglobin 13 1  Platelet count 000059   06/2017 CBC WBC 4,870  Hemoglobin 12 9  Platelet count 673,556   Psychiatric/Behavioral: Negative for dysphoric mood and sleep disturbance  Objective:      /80   Pulse 64   Temp (!) 96 8 °F (36 °C)   Resp 16   Ht 5' 6 5" (1 689 m)   Wt 77 6 kg (171 lb)   BMI 27 19 kg/m²          Physical Exam   Constitutional: She is oriented to person, place, and time  She appears well-developed and well-nourished  No distress  HENT:   Mouth/Throat: Oropharynx is clear and moist and mucous membranes are normal  No oral lesions  Normal dentition  Eyes: Pupils are equal, round, and reactive to light  Conjunctivae and EOM are normal  No scleral icterus  Neck: No JVD present  Carotid bruit is not present  No tracheal deviation present  No thyroid mass and no thyromegaly present  Cardiovascular: Normal rate, regular rhythm and normal heart sounds    Exam reveals no gallop  No murmur heard  Pulmonary/Chest: Effort normal and breath sounds normal  No respiratory distress  She has no wheezes  She has no rales  Abdominal: Soft  Bowel sounds are normal  She exhibits no distension and no mass  There is no tenderness  There is no rebound and no guarding  Musculoskeletal: Normal range of motion  She exhibits no edema  Lymphadenopathy:     She has no cervical adenopathy  Neurological: She is alert and oriented to person, place, and time  She displays normal reflexes  No cranial nerve deficit  Skin: No rash noted  Psychiatric: She has a normal mood and affect  Nursing note and vitals reviewed

## 2018-12-26 DIAGNOSIS — I10 ESSENTIAL HYPERTENSION: ICD-10-CM

## 2018-12-26 RX ORDER — METOPROLOL TARTRATE 50 MG/1
50 TABLET, FILM COATED ORAL 2 TIMES DAILY
Qty: 180 TABLET | Refills: 3 | Status: SHIPPED | OUTPATIENT
Start: 2018-12-26 | End: 2020-01-16 | Stop reason: SDUPTHER

## 2018-12-26 RX ORDER — AMLODIPINE BESYLATE 2.5 MG/1
2.5 TABLET ORAL DAILY
Qty: 90 TABLET | Refills: 3 | Status: SHIPPED | OUTPATIENT
Start: 2018-12-26 | End: 2019-07-29 | Stop reason: SDUPTHER

## 2018-12-26 RX ORDER — IRBESARTAN 300 MG/1
300 TABLET ORAL
Qty: 90 TABLET | Refills: 3 | Status: SHIPPED | OUTPATIENT
Start: 2018-12-26 | End: 2020-01-16 | Stop reason: SDUPTHER

## 2019-01-24 ENCOUNTER — OFFICE VISIT (OUTPATIENT)
Dept: FAMILY MEDICINE CLINIC | Facility: CLINIC | Age: 79
End: 2019-01-24
Payer: MEDICARE

## 2019-01-24 VITALS
WEIGHT: 171.6 LBS | HEIGHT: 66 IN | DIASTOLIC BLOOD PRESSURE: 84 MMHG | TEMPERATURE: 98.2 F | HEART RATE: 64 BPM | BODY MASS INDEX: 27.58 KG/M2 | SYSTOLIC BLOOD PRESSURE: 134 MMHG | RESPIRATION RATE: 16 BRPM

## 2019-01-24 DIAGNOSIS — K57.90 DIVERTICULOSIS OF INTESTINE WITHOUT BLEEDING, UNSPECIFIED INTESTINAL TRACT LOCATION: ICD-10-CM

## 2019-01-24 DIAGNOSIS — K21.9 GASTROESOPHAGEAL REFLUX DISEASE WITHOUT ESOPHAGITIS: Primary | ICD-10-CM

## 2019-01-24 DIAGNOSIS — R07.89 CHEST PRESSURE: ICD-10-CM

## 2019-01-24 PROCEDURE — 93000 ELECTROCARDIOGRAM COMPLETE: CPT | Performed by: NURSE PRACTITIONER

## 2019-01-24 PROCEDURE — 99214 OFFICE O/P EST MOD 30 MIN: CPT | Performed by: NURSE PRACTITIONER

## 2019-01-24 NOTE — PROGRESS NOTES
Assessment/Plan:         Problem List Items Addressed This Visit     Chest pressure  Gastroesophageal reflux disease   --EKG today without ischemic changes  Her symptoms are post-prandial and non-activity related, with no associated red flags  Suspect GERD/dyspepsia vs  hiatal hernia/other GI  Doubt gallbladder  --Offered Rx PPI, but she wishes to go with OTC H2-blocker alone, for now  Advised that she can take this BID   --Dietary measures discussed/encouraged, including avoiding butter/fatty/greasy foods, onions, tomato sauce, etc   No heavy meals or eating close to bed  --Weight loss  --Call if no improvement with these measures over the next 1-2 weeks-->consider Rx PPI  Mention to GI/colo rectal if ongoing  May warrant EGD at same time as her colonoscopy  ER for worsening/red flags      Relevant Orders    POCT ECG (Completed)--NSR with few PVC's  No previous available for comparison, but patient states that these findings were mentioned to her in the past   Previous negative stress test      Diverticulosis  --Sounds like she may have had episode of mild diverticulitis vs  constipation two weeks ago, with symptoms now resolved  --Future preventative measures discussed including avoiding seeds, nuts, other trigger foods  Adequate fiber  --Call for recurrent symptoms  ER for severe/associated red flags  --Due for repeat colonoscopy which she will be calling to schedule  Subjective:      Patient ID: Sherry Estes is a 77 y o  female  Here with complaints of intermittent "pressure" sensation felt lower mid sternum/upper mid epigastric region intermittently x 5 days  Felt shortly after meals  Lasts 1-2 hours  No radiation to chest, back, arms, remainder of abdomen  Associated early satiety, need to burp  No N/V, bloating, dyspnea, dizziness, diaphoresis, palpitations  Mild dysphagia on occasion, but no worse over the past week  No unintentional weight loss    Some relief with OTC Pepcid  Tried Tums also, which didn't help  Does NOT occur during exertion  Goes to gym a couple times a week  Mix of cardio and weights  Things she may have had episode of diverticulitis 2 weeks ago  Developed lower mid abdominal aching discomfort  Had some blueberries and popcorn prior  Gradually abated over the next 2-3 days and has not recurred  No fever/chills  Mildly constipated at the time  Normal bowel movements since  No diarrhea, melena, hematochezia  No urinary changes including dysuria, frequency, urgency, hematuria  Did not feel like UTI  No vaginal discharge or spotting  Has gotten heartburn occasionally in the past, but not this frequent  No symptoms at present  Diet is fairly healthy overall, but over the past 2-3 weeks she has been eating out regularly  More peppers, onions, butter, sour cream then she is used to  She feels that this is a likely reason for her symptoms  Mild lactose intolerance  1 cup decaf coffee per day  No other caffeine  Rare social alcohol  No smoking  Denies (known) history of CAD  Had negative nuclear stress test 2014  Colonoscopy 11/2013 (Dr Clementina Mcginnis) showing mod/severe diverticulosis, polyp  Due for repeat  Denies history of GI surgeries including cholecystectomy  The following portions of the patient's history were reviewed and updated as appropriate: current medications, past family history, past medical history, past social history, past surgical history and problem list     Review of Systems   Constitutional: Negative for fever  Respiratory: Negative for cough and shortness of breath  Cardiovascular: Negative for chest pain  Gastrointestinal: Positive for abdominal pain and constipation  Negative for blood in stool, diarrhea, nausea and vomiting  Genitourinary: Negative for dysuria  Neurological: Negative for dizziness           Objective:       /84   Pulse 64   Temp 98 2 °F (36 8 °C) Resp 16   Ht 5' 6" (1 676 m)   Wt 77 8 kg (171 lb 9 6 oz)   BMI 27 70 kg/m²          Physical Exam   Constitutional: She is oriented to person, place, and time  She appears well-developed and well-nourished  No distress  HENT:   Head: Normocephalic  Right Ear: External ear normal    Left Ear: External ear normal    Nose: Nose normal    Mouth/Throat: Oropharynx is clear and moist    Eyes: Pupils are equal, round, and reactive to light  Conjunctivae are normal  No scleral icterus  Neck: Normal range of motion  Neck supple  Cardiovascular: Normal rate, regular rhythm and normal heart sounds  Pulmonary/Chest: Effort normal and breath sounds normal  She exhibits no tenderness  Abdominal: Soft  Bowel sounds are normal  She exhibits no distension and no mass  There is no tenderness  There is no rebound and no guarding  Negative Ramos   Lymphadenopathy:     She has no cervical adenopathy  Neurological: She is alert and oriented to person, place, and time  She has normal reflexes  Skin: Skin is warm and dry  She is not diaphoretic  Psychiatric: She has a normal mood and affect

## 2019-01-24 NOTE — PATIENT INSTRUCTIONS
Gastroesophageal Reflux Disease   AMBULATORY CARE:   Gastroesophageal reflux  reflux occurs when acid and food in the stomach back up into the esophagus  Gastroesophageal reflux disease (GERD) is reflux that occurs more than twice a week for a few weeks  It usually causes heartburn and other symptoms  GERD can cause other health problems over time if it is not treated  Common symptoms include:  Heartburn is the most common symptom of GERD  You may feel burning pain in your chest or below the breast bone  This usually occurs after meals and spreads to your neck, jaw, or shoulder  The pain gets better when you change positions  You may also have any of the following:  · Bitter or acid taste in your mouth    · Dry cough    · Trouble swallowing or pain with swallowing    · Hoarseness or sore throat    · Frequent burping or hiccups    · Feeling of fullness soon after you start eating  Seek care immediately if:  · You feel full and cannot burp or vomit  · You have severe chest pain and sudden trouble breathing  · Your bowel movements are black, bloody, or tarry-looking  · Your vomit looks like coffee grounds or has blood in it  Contact your healthcare provider if:   · You vomit large amounts, or you vomit often  · You have trouble breathing after you vomit  · You have trouble swallowing, or pain with swallowing  · You are losing weight without trying  · Your symptoms get worse or do not improve with treatment  · You have questions or concerns about your condition or care  Treatment for GERD:  Your healthcare provider may prescribe medicine to decrease stomach acid  He may also prescribe medicine that help your esophagus and stomach move food and liquid to your intestines  Surgery may be done if other treatments do not work  You may need surgery to wrap the upper part of the stomach around the esophageal sphincter  This will strengthen the sphincter and prevent reflux     Manage GERD: · Do not have foods or drinks that may increase heartburn  These include chocolate, peppermint, fried or fatty foods, drinks that contain caffeine, or carbonated drinks (soda)  Other foods include spicy foods, onions, tomatoes, and tomato-based foods  Do not have foods or drinks that can irritate your esophagus, such as citrus fruits, juices, and alcohol  · Do not eat large meals  When you eat a lot of food at one time, your stomach needs more acid to digest it  Eat 6 small meals each day instead of 3 large ones, and eat slowly  Do not eat meals 2 to 3 hours before bedtime  · Elevate the head of your bed  Place 6-inch blocks under the head of your bed frame  You may also use more than one pillow under your head and shoulders while you sleep  · Maintain a healthy weight  If you are overweight, weight loss may help relieve symptoms of GERD  · Do not smoke  Smoking weakens the lower esophageal sphincter and increases the risk of GERD  Ask your healthcare provider for information if you currently smoke and need help to quit  E-cigarettes or smokeless tobacco still contain nicotine  Talk to your healthcare provider before you use these products  · Do not wear clothing that is tight around your waist   Tight clothing can put pressure on your stomach and cause or worsen GERD symptoms  Follow up with your healthcare provider as directed:  Write down your questions so you remember to ask them during your visits  © 2017 [employer] Information is for End User's use only and may not be sold, redistributed or otherwise used for commercial purposes  All illustrations and images included in CareNotes® are the copyrighted property of [employer]  or [employer]  The above information is an  only  It is not intended as medical advice for individual conditions or treatments   Talk to your doctor, nurse or pharmacist before following any medical regimen to see if it is safe and effective for you

## 2019-01-28 PROBLEM — R10.9 ABDOMINAL PAIN: Status: ACTIVE | Noted: 2019-01-28

## 2019-01-28 PROBLEM — Z80.0 FAMILY HISTORY OF COLON CANCER: Status: ACTIVE | Noted: 2019-01-28

## 2019-02-18 ENCOUNTER — LAB REQUISITION (OUTPATIENT)
Dept: LAB | Facility: HOSPITAL | Age: 79
End: 2019-02-18
Payer: MEDICARE

## 2019-02-18 DIAGNOSIS — N39.0 URINARY TRACT INFECTION: ICD-10-CM

## 2019-02-18 PROCEDURE — 88112 CYTOPATH CELL ENHANCE TECH: CPT | Performed by: PATHOLOGY

## 2019-02-18 PROCEDURE — 87086 URINE CULTURE/COLONY COUNT: CPT | Performed by: UROLOGY

## 2019-02-19 LAB — BACTERIA UR CULT: NORMAL

## 2019-02-21 ENCOUNTER — TRANSCRIBE ORDERS (OUTPATIENT)
Dept: ADMINISTRATIVE | Facility: HOSPITAL | Age: 79
End: 2019-02-21

## 2019-02-21 DIAGNOSIS — N39.0 URINARY TRACT INFECTION WITHOUT HEMATURIA, SITE UNSPECIFIED: Primary | ICD-10-CM

## 2019-02-25 ENCOUNTER — HOSPITAL ENCOUNTER (OUTPATIENT)
Dept: RADIOLOGY | Age: 79
Discharge: HOME | End: 2019-02-25
Payer: MEDICARE

## 2019-02-25 DIAGNOSIS — N39.0 URINARY TRACT INFECTION WITHOUT HEMATURIA, SITE UNSPECIFIED: ICD-10-CM

## 2019-02-25 PROCEDURE — 51798 US URINE CAPACITY MEASURE: CPT

## 2019-04-24 DIAGNOSIS — Z12.39 BREAST CANCER SCREENING: Primary | ICD-10-CM

## 2019-05-02 ENCOUNTER — HOSPITAL ENCOUNTER (OUTPATIENT)
Dept: RADIOLOGY | Age: 79
Discharge: HOME | End: 2019-05-02
Payer: MEDICARE

## 2019-05-02 VITALS — WEIGHT: 165 LBS | BODY MASS INDEX: 25.9 KG/M2 | HEIGHT: 67 IN

## 2019-05-02 DIAGNOSIS — Z12.39 BREAST CANCER SCREENING: ICD-10-CM

## 2019-05-02 PROCEDURE — 77067 SCR MAMMO BI INCL CAD: CPT

## 2019-05-21 ENCOUNTER — TRANSCRIBE ORDERS (OUTPATIENT)
Dept: ADMINISTRATIVE | Age: 79
End: 2019-05-21

## 2019-05-21 ENCOUNTER — APPOINTMENT (OUTPATIENT)
Dept: LAB | Age: 79
End: 2019-05-21
Payer: MEDICARE

## 2019-05-21 DIAGNOSIS — N39.0 URINARY TRACT INFECTION WITHOUT HEMATURIA, SITE UNSPECIFIED: ICD-10-CM

## 2019-05-21 DIAGNOSIS — N39.0 URINARY TRACT INFECTION WITHOUT HEMATURIA, SITE UNSPECIFIED: Primary | ICD-10-CM

## 2019-05-21 LAB
ALBUMIN SERPL BCP-MCNC: 3.3 G/DL (ref 3.5–5)
ALP SERPL-CCNC: 67 U/L (ref 46–116)
ALT SERPL W P-5'-P-CCNC: 27 U/L (ref 12–78)
ANION GAP SERPL CALCULATED.3IONS-SCNC: 5 MMOL/L (ref 4–13)
AST SERPL W P-5'-P-CCNC: 18 U/L (ref 5–45)
BASOPHILS # BLD AUTO: 0.06 THOUSANDS/ΜL (ref 0–0.1)
BASOPHILS NFR BLD AUTO: 1 % (ref 0–1)
BILIRUB SERPL-MCNC: 0.68 MG/DL (ref 0.2–1)
BUN SERPL-MCNC: 15 MG/DL (ref 5–25)
CALCIUM SERPL-MCNC: 8.2 MG/DL (ref 8.3–10.1)
CHLORIDE SERPL-SCNC: 106 MMOL/L (ref 100–108)
CHOLEST SERPL-MCNC: 193 MG/DL (ref 50–200)
CO2 SERPL-SCNC: 27 MMOL/L (ref 21–32)
CREAT SERPL-MCNC: 0.79 MG/DL (ref 0.6–1.3)
EOSINOPHIL # BLD AUTO: 0.14 THOUSAND/ΜL (ref 0–0.61)
EOSINOPHIL NFR BLD AUTO: 3 % (ref 0–6)
ERYTHROCYTE [DISTWIDTH] IN BLOOD BY AUTOMATED COUNT: 11.7 % (ref 11.6–15.1)
GFR SERPL CREATININE-BSD FRML MDRD: 71 ML/MIN/1.73SQ M
GLUCOSE P FAST SERPL-MCNC: 101 MG/DL (ref 65–99)
HCT VFR BLD AUTO: 39.9 % (ref 34.8–46.1)
HDLC SERPL-MCNC: 52 MG/DL (ref 40–60)
HGB BLD-MCNC: 12.9 G/DL (ref 11.5–15.4)
IMM GRANULOCYTES # BLD AUTO: 0.01 THOUSAND/UL (ref 0–0.2)
IMM GRANULOCYTES NFR BLD AUTO: 0 % (ref 0–2)
LDLC SERPL CALC-MCNC: 122 MG/DL (ref 0–100)
LYMPHOCYTES # BLD AUTO: 1.42 THOUSANDS/ΜL (ref 0.6–4.47)
LYMPHOCYTES NFR BLD AUTO: 29 % (ref 14–44)
MCH RBC QN AUTO: 30.7 PG (ref 26.8–34.3)
MCHC RBC AUTO-ENTMCNC: 32.3 G/DL (ref 31.4–37.4)
MCV RBC AUTO: 95 FL (ref 82–98)
MONOCYTES # BLD AUTO: 0.56 THOUSAND/ΜL (ref 0.17–1.22)
MONOCYTES NFR BLD AUTO: 12 % (ref 4–12)
NEUTROPHILS # BLD AUTO: 2.69 THOUSANDS/ΜL (ref 1.85–7.62)
NEUTS SEG NFR BLD AUTO: 55 % (ref 43–75)
NONHDLC SERPL-MCNC: 141 MG/DL
NRBC BLD AUTO-RTO: 0 /100 WBCS
PLATELET # BLD AUTO: 255 THOUSANDS/UL (ref 149–390)
PMV BLD AUTO: 10.4 FL (ref 8.9–12.7)
POTASSIUM SERPL-SCNC: 3.9 MMOL/L (ref 3.5–5.3)
PROT SERPL-MCNC: 6.5 G/DL (ref 6.4–8.2)
RBC # BLD AUTO: 4.2 MILLION/UL (ref 3.81–5.12)
SODIUM SERPL-SCNC: 138 MMOL/L (ref 136–145)
TRIGL SERPL-MCNC: 95 MG/DL
TSH SERPL DL<=0.05 MIU/L-ACNC: 3.55 UIU/ML (ref 0.36–3.74)
WBC # BLD AUTO: 4.88 THOUSAND/UL (ref 4.31–10.16)

## 2019-05-21 PROCEDURE — 36415 COLL VENOUS BLD VENIPUNCTURE: CPT | Performed by: FAMILY MEDICINE

## 2019-05-21 PROCEDURE — 80061 LIPID PANEL: CPT | Performed by: FAMILY MEDICINE

## 2019-05-21 PROCEDURE — 84443 ASSAY THYROID STIM HORMONE: CPT | Performed by: FAMILY MEDICINE

## 2019-05-21 PROCEDURE — 85025 COMPLETE CBC W/AUTO DIFF WBC: CPT | Performed by: FAMILY MEDICINE

## 2019-05-21 PROCEDURE — 80053 COMPREHEN METABOLIC PANEL: CPT | Performed by: FAMILY MEDICINE

## 2019-06-19 ENCOUNTER — OFFICE VISIT (OUTPATIENT)
Dept: FAMILY MEDICINE CLINIC | Facility: CLINIC | Age: 79
End: 2019-06-19
Payer: MEDICARE

## 2019-06-19 VITALS
WEIGHT: 170 LBS | DIASTOLIC BLOOD PRESSURE: 72 MMHG | BODY MASS INDEX: 25.76 KG/M2 | SYSTOLIC BLOOD PRESSURE: 138 MMHG | HEIGHT: 68 IN | HEART RATE: 56 BPM | RESPIRATION RATE: 16 BRPM | TEMPERATURE: 98.7 F

## 2019-06-19 DIAGNOSIS — I10 ESSENTIAL HYPERTENSION: Primary | ICD-10-CM

## 2019-06-19 DIAGNOSIS — E78.00 PURE HYPERCHOLESTEROLEMIA: ICD-10-CM

## 2019-06-19 DIAGNOSIS — R73.01 IFG (IMPAIRED FASTING GLUCOSE): ICD-10-CM

## 2019-06-19 DIAGNOSIS — Z83.3 FH: TYPE 2 DIABETES MELLITUS: ICD-10-CM

## 2019-06-19 DIAGNOSIS — Z00.00 MEDICARE ANNUAL WELLNESS VISIT, SUBSEQUENT: ICD-10-CM

## 2019-06-19 PROBLEM — R10.9 ABDOMINAL PAIN: Status: RESOLVED | Noted: 2019-01-28 | Resolved: 2019-06-19

## 2019-06-19 PROCEDURE — 99214 OFFICE O/P EST MOD 30 MIN: CPT | Performed by: FAMILY MEDICINE

## 2019-06-19 PROCEDURE — G0439 PPPS, SUBSEQ VISIT: HCPCS | Performed by: FAMILY MEDICINE

## 2019-07-18 ENCOUNTER — TELEPHONE (OUTPATIENT)
Dept: FAMILY MEDICINE CLINIC | Facility: CLINIC | Age: 79
End: 2019-07-18

## 2019-07-18 NOTE — TELEPHONE ENCOUNTER
Patient called and states that she just doesn't feel right the last week or so  She states that she feels lightheaded and her blood pressure is elevated  She states that this afternoon her blood pressure was 183/88  Per Dr Ector Mosestise she can increase her Amlodipine to 1 tablet twice daily  He would like her to monitor her blood pressure and come in for a nurse visit in 1 week  Patient scheduled for nurse visit next Friday, 07/26/19

## 2019-07-19 ENCOUNTER — OFFICE VISIT (OUTPATIENT)
Dept: URGENT CARE | Age: 79
End: 2019-07-19
Payer: MEDICARE

## 2019-07-19 VITALS
SYSTOLIC BLOOD PRESSURE: 184 MMHG | RESPIRATION RATE: 16 BRPM | HEIGHT: 67 IN | HEART RATE: 77 BPM | TEMPERATURE: 97.6 F | BODY MASS INDEX: 27.34 KG/M2 | OXYGEN SATURATION: 97 % | WEIGHT: 174.2 LBS | DIASTOLIC BLOOD PRESSURE: 86 MMHG

## 2019-07-19 DIAGNOSIS — R39.89 POSSIBLE URINARY TRACT INFECTION: ICD-10-CM

## 2019-07-19 DIAGNOSIS — R42 DIZZY: Primary | ICD-10-CM

## 2019-07-19 LAB
SL AMB  POCT GLUCOSE, UA: NEGATIVE
SL AMB LEUKOCYTE ESTERASE,UA: NEGATIVE
SL AMB POCT BILIRUBIN,UA: NEGATIVE
SL AMB POCT BLOOD,UA: NEGATIVE
SL AMB POCT CLARITY,UA: CLEAR
SL AMB POCT COLOR,UA: YELLOW
SL AMB POCT KETONES,UA: NEGATIVE
SL AMB POCT NITRITE,UA: NEGATIVE
SL AMB POCT PH,UA: 6
SL AMB POCT SPECIFIC GRAVITY,UA: 1.01
SL AMB POCT URINE PROTEIN: NEGATIVE
SL AMB POCT UROBILINOGEN: 0.2

## 2019-07-19 PROCEDURE — 87086 URINE CULTURE/COLONY COUNT: CPT | Performed by: FAMILY MEDICINE

## 2019-07-19 PROCEDURE — 81002 URINALYSIS NONAUTO W/O SCOPE: CPT | Performed by: FAMILY MEDICINE

## 2019-07-19 PROCEDURE — G0463 HOSPITAL OUTPT CLINIC VISIT: HCPCS | Performed by: FAMILY MEDICINE

## 2019-07-19 PROCEDURE — 99213 OFFICE O/P EST LOW 20 MIN: CPT | Performed by: FAMILY MEDICINE

## 2019-07-19 NOTE — PATIENT INSTRUCTIONS
Options discussed with patient  Rest, limit activity  Continue Norvasc 2 5 mg twice daily  Take urinary tract medication as discussed  Recheck/follow-up with family physician as scheduled  Please go to the hospital emergency department if needed

## 2019-07-19 NOTE — PROGRESS NOTES
[facility]        NAME: Maricel Villa is a [de-identified] y o  female  : 1940    MRN: [id number]  DATE: 2019  TIME: 4:23 PM    Assessment and Plan   Dizzy [R42]  1  Dizzy     2  Possible urinary tract infection  POCT urine dip    Urine culture         Patient Instructions     Patient Instructions   Options discussed with patient  Rest, limit activity  Continue Norvasc 2 5 mg twice daily  Take urinary tract medication as discussed  Recheck/follow-up with family physician as scheduled  Please go to the hospital emergency department if needed  Follow up with PCP in 3-5 days  Proceed to  ER if symptoms worsen  Chief Complaint     Chief Complaint   Patient presents with    Dizziness     Pt complaining of lightheadedness and elevated BP x1 5 weeks  She states that she gets UTIs frequently with atypical symptoms and was wondering if that's what is wrong  History of Present Illness       Patient states she has not been feeling well for the last 1-1/2 weeks; patient states she has felt intermittent dizziness and his had increased blood pressure; patient states she thinks she may have a urinary tract infection; patient states she spoke with her family physician yesterday and the Norvasc was increased to 2 5 mg twice a day; patient is going to be recheck by her family physician next week      Review of Systems   Review of Systems   Neurological: Positive for dizziness  All other systems reviewed and are negative          Current Medications       Current Outpatient Medications:     amLODIPine (NORVASC) 2 5 mg tablet, Take 1 tablet (2 5 mg total) by mouth daily (Patient taking differently: Take 5 mg by mouth daily ), Disp: 90 tablet, Rfl: 3    Calcium Carb-Cholecalciferol (CALCIUM 1000 + D) 1000-800 MG-UNIT TABS, Take by mouth, Disp: , Rfl:     Cholecalciferol 2000 units CAPS, Take 1 capsule by mouth, Disp: , Rfl:     irbesartan (AVAPRO) 300 mg tablet, Take 1 tablet (300 mg total) by mouth daily at bedtime, Disp: 90 tablet, Rfl: 3    methenamine hippurate (HIPREX) 1 g tablet, Take 1 g by mouth, Disp: , Rfl:     metoprolol tartrate (LOPRESSOR) 50 mg tablet, Take 1 tablet (50 mg total) by mouth 2 (two) times a day, Disp: 180 tablet, Rfl: 3    multivitamin (THERAGRAN) TABS, Take 1 tablet by mouth, Disp: , Rfl:     Current Allergies     Allergies as of 07/19/2019 - Reviewed 07/19/2019   Allergen Reaction Noted    Penicillins Other (See Comments) 06/10/2005            The following portions of the patient's history were reviewed and updated as appropriate: allergies, current medications, past family history, past medical history, past social history, past surgical history and problem list      Past Medical History:   Diagnosis Date    Accelerated essential hypertension     last assessed 08/17/2015    Depression with anxiety     last assesed 08/16/2012       Past Surgical History:   Procedure Laterality Date    BLEPHAROPLASTY      upper lid w/excessive skin    CARDIOVASCULAR STRESS TEST      onset June 2005    COLONOSCOPY      DILATION AND CURETTAGE, DIAGNOSTIC / THERAPEUTIC      ENDOMETRIAL BIOPSY      negative for dysplasia, hyperplasia and malignancy, resolved 03/25/2013    TUBAL LIGATION         Family History   Problem Relation Age of Onset    Heart disease Father         cardiac disorder    Skin cancer Father     Colon cancer Sister 80    Heart disease Family     Hypertension Family     Coronary artery disease Family     No Known Problems Daughter     No Known Problems Daughter     No Known Problems Daughter     No Known Problems Mother          Medications have been verified          Objective   BP (!) 184/86 (BP Location: Right arm, Patient Position: Sitting)   Pulse 77   Temp 97 6 °F (36 4 °C) (Temporal)   Resp 16   Ht 5' 7" (1 702 m)   Wt 79 kg (174 lb 3 2 oz)   SpO2 97%   BMI 27 28 kg/m²        Physical Exam     Physical Exam   Constitutional: She is oriented to person, place, and time  She appears well-developed and well-nourished  HENT:   Mouth/Throat: Oropharynx is clear and moist    Neck: Normal range of motion  Neck supple  Cardiovascular: Normal rate, regular rhythm and normal heart sounds  Pulmonary/Chest: Effort normal and breath sounds normal    Abdominal: Soft  Neurological: She is alert and oriented to person, place, and time  No nuchal rigidity   Skin:   Fair color and turgor   Psychiatric: She has a normal mood and affect  Her behavior is normal    Nursing note and vitals reviewed

## 2019-07-21 LAB — BACTERIA UR CULT: NORMAL

## 2019-07-22 ENCOUNTER — TELEPHONE (OUTPATIENT)
Dept: URGENT CARE | Age: 79
End: 2019-07-22

## 2019-07-22 NOTE — TELEPHONE ENCOUNTER
07/22/2019 - 08:12 a m :  Spoke with patient via phone; urine culture results reviewed with patient (mixed contaminants); patient states she is feeling better; patient has a follow-up appointment with her family physician this week

## 2019-07-26 ENCOUNTER — TELEPHONE (OUTPATIENT)
Dept: FAMILY MEDICINE CLINIC | Facility: CLINIC | Age: 79
End: 2019-07-26

## 2019-07-26 ENCOUNTER — CLINICAL SUPPORT (OUTPATIENT)
Dept: FAMILY MEDICINE CLINIC | Facility: CLINIC | Age: 79
End: 2019-07-26
Payer: MEDICARE

## 2019-07-26 VITALS
WEIGHT: 172 LBS | HEIGHT: 68 IN | OXYGEN SATURATION: 98 % | BODY MASS INDEX: 26.07 KG/M2 | DIASTOLIC BLOOD PRESSURE: 62 MMHG | TEMPERATURE: 97.7 F | RESPIRATION RATE: 17 BRPM | HEART RATE: 48 BPM | SYSTOLIC BLOOD PRESSURE: 144 MMHG

## 2019-07-26 DIAGNOSIS — F41.8 SITUATIONAL ANXIETY: Primary | ICD-10-CM

## 2019-07-26 DIAGNOSIS — I10 ESSENTIAL HYPERTENSION: ICD-10-CM

## 2019-07-26 DIAGNOSIS — I10 ESSENTIAL HYPERTENSION: Primary | ICD-10-CM

## 2019-07-26 PROCEDURE — 99211 OFF/OP EST MAY X REQ PHY/QHP: CPT

## 2019-07-26 RX ORDER — LORAZEPAM 0.5 MG/1
0.5 TABLET ORAL 2 TIMES DAILY PRN
Qty: 20 TABLET | Refills: 0 | Status: SHIPPED | OUTPATIENT
Start: 2019-07-26 | End: 2019-09-05 | Stop reason: ALTCHOICE

## 2019-07-26 RX ORDER — HYDROCHLOROTHIAZIDE 12.5 MG/1
12.5 TABLET ORAL DAILY PRN
Qty: 30 TABLET | Refills: 2 | Status: SHIPPED | OUTPATIENT
Start: 2019-07-26 | End: 2019-09-05 | Stop reason: ALTCHOICE

## 2019-07-26 NOTE — TELEPHONE ENCOUNTER
Call continue with current dose of amlodipine  Script for p r n   Diuretic and Ativan sent to pharmacy

## 2019-07-26 NOTE — TELEPHONE ENCOUNTER
Patient was here today for a blood pressure check  BP was 144/62  Pulse was 48, but sporadic  Patient states that she is going to [facility] next week and she would like ativan for her anxiety for prn use  Also, she states that her ankles have been getting swollen  She is asking for a mild diuretic for prn use  Also, she is also asking if you are going to keep her on the same dose of Amlodipine? Please advise

## 2019-07-26 NOTE — PROGRESS NOTES
Patient here for blood pressure check  Blood pressure was 144/62  All other vitals normal  See telephone call

## 2019-07-29 DIAGNOSIS — I10 ESSENTIAL HYPERTENSION: ICD-10-CM

## 2019-07-29 RX ORDER — AMLODIPINE BESYLATE 2.5 MG/1
2.5 TABLET ORAL 2 TIMES DAILY
Qty: 180 TABLET | Refills: 3 | Status: SHIPPED | OUTPATIENT
Start: 2019-07-29 | End: 2020-01-16 | Stop reason: SDUPTHER

## 2019-07-29 NOTE — TELEPHONE ENCOUNTER
Spoke with patient, gave message  Patient requested refill on amlodipine sent to mail order  She also requested the dosage to stay at 2 5 mg BID    Rx sent to Dr Ashlee Levy for approval

## 2019-08-18 ENCOUNTER — OFFICE VISIT (OUTPATIENT)
Dept: URGENT CARE | Age: 79
End: 2019-08-18
Payer: MEDICARE

## 2019-08-18 VITALS
BODY MASS INDEX: 25.95 KG/M2 | TEMPERATURE: 98.3 F | OXYGEN SATURATION: 97 % | DIASTOLIC BLOOD PRESSURE: 61 MMHG | WEIGHT: 171.25 LBS | HEIGHT: 68 IN | HEART RATE: 52 BPM | SYSTOLIC BLOOD PRESSURE: 136 MMHG

## 2019-08-18 DIAGNOSIS — J02.9 SORE THROAT: Primary | ICD-10-CM

## 2019-08-18 DIAGNOSIS — J02.0 STREP PHARYNGITIS: ICD-10-CM

## 2019-08-18 LAB — S PYO AG THROAT QL: POSITIVE

## 2019-08-18 PROCEDURE — G0463 HOSPITAL OUTPT CLINIC VISIT: HCPCS | Performed by: FAMILY MEDICINE

## 2019-08-18 PROCEDURE — 99213 OFFICE O/P EST LOW 20 MIN: CPT | Performed by: FAMILY MEDICINE

## 2019-08-18 PROCEDURE — 87880 STREP A ASSAY W/OPTIC: CPT | Performed by: PHYSICIAN ASSISTANT

## 2019-08-18 RX ORDER — AZITHROMYCIN 250 MG/1
TABLET, FILM COATED ORAL
Qty: 6 TABLET | Refills: 0 | Status: SHIPPED | OUTPATIENT
Start: 2019-08-18 | End: 2019-08-22

## 2019-08-18 NOTE — PROGRESS NOTES
[facility]        NAME: Chiquita Bowling is a [de-identified] y o  female  : 1940    MRN: [id number]  DATE: 2019  TIME: 8:35 AM    Assessment and Plan   Sore throat [J02 9]  1  Sore throat  POCT rapid strepA    azithromycin (ZITHROMAX) 250 mg tablet    al mag oxide-diphenhydramine-lidocaine viscous (MAGIC MOUTHWASH) 1:1:1 suspension   2  Strep pharyngitis  azithromycin (ZITHROMAX) 250 mg tablet         Patient Instructions       Follow up with PCP in 3-5 days  Proceed to  ER if symptoms worsen  Chief Complaint     Chief Complaint   Patient presents with    Cold Like Symptoms     Pt states has a sore throat x 7 days with some cough,denies any fever  Pt was taking otc tylenol,motrin  History of Present Illness       Patient here for evaluation of sore throat, cough, head congestion  Patient's symptoms started a week ago and have been getting progressively worse  Review of Systems   Review of Systems   Constitutional: Negative  HENT: Positive for congestion, postnasal drip and sore throat  Negative for ear pain, rhinorrhea, sinus pressure, sinus pain, trouble swallowing and voice change  Eyes: Negative  Respiratory: Positive for cough  Negative for shortness of breath and wheezing  Cardiovascular: Negative            Current Medications       Current Outpatient Medications:     amLODIPine (NORVASC) 2 5 mg tablet, Take 1 tablet (2 5 mg total) by mouth 2 (two) times a day, Disp: 180 tablet, Rfl: 3    Calcium Carb-Cholecalciferol (CALCIUM 1000 + D) 1000-800 MG-UNIT TABS, Take by mouth, Disp: , Rfl:     Cholecalciferol 2000 units CAPS, Take 1 capsule by mouth, Disp: , Rfl:     hydrochlorothiazide (HYDRODIURIL) 12 5 mg tablet, Take 1 tablet (12 5 mg total) by mouth daily as needed (lower extremity edema) for up to 30 days, Disp: 30 tablet, Rfl: 2    irbesartan (AVAPRO) 300 mg tablet, Take 1 tablet (300 mg total) by mouth daily at bedtime, Disp: 90 tablet, Rfl: 3   LORazepam (ATIVAN) 0 5 mg tablet, Take 1 tablet (0 5 mg total) by mouth 2 (two) times a day as needed for anxiety for up to 30 days, Disp: 20 tablet, Rfl: 0    methenamine hippurate (HIPREX) 1 g tablet, Take 1 g by mouth, Disp: , Rfl:     metoprolol tartrate (LOPRESSOR) 50 mg tablet, Take 1 tablet (50 mg total) by mouth 2 (two) times a day, Disp: 180 tablet, Rfl: 3    multivitamin (THERAGRAN) TABS, Take 1 tablet by mouth, Disp: , Rfl:     al mag oxide-diphenhydramine-lidocaine viscous (MAGIC MOUTHWASH) 1:1:1 suspension, Swish and spit 10 mL every 4 (four) hours as needed for mouth pain or discomfort, Disp: 90 mL, Rfl: 0    azithromycin (ZITHROMAX) 250 mg tablet, Take 2 tablets day 1 then 1 tab days 2-5, Disp: 6 tablet, Rfl: 0    Current Allergies     Allergies as of 08/18/2019 - Reviewed 08/18/2019   Allergen Reaction Noted    Penicillins Rash 06/10/2005            The following portions of the patient's history were reviewed and updated as appropriate: allergies, current medications, past family history, past medical history, past social history, past surgical history and problem list      Past Medical History:   Diagnosis Date    Accelerated essential hypertension     last assessed 08/17/2015    Depression with anxiety     last assesed 08/16/2012       Past Surgical History:   Procedure Laterality Date    BLEPHAROPLASTY      upper lid w/excessive skin    CARDIOVASCULAR STRESS TEST      onset June 2005    COLONOSCOPY      DILATION AND CURETTAGE, DIAGNOSTIC / THERAPEUTIC      ENDOMETRIAL BIOPSY      negative for dysplasia, hyperplasia and malignancy, resolved 03/25/2013    TUBAL LIGATION         Family History   Problem Relation Age of Onset    Heart disease Father         cardiac disorder    Skin cancer Father     Colon cancer Sister 80    Heart disease Family     Hypertension Family     Coronary artery disease Family     No Known Problems Daughter     No Known Problems Daughter     No Known Problems Daughter     No Known Problems Mother          Medications have been verified  Objective   /61   Pulse (!) 52   Temp 98 3 °F (36 8 °C) (Temporal)   Ht 5' 7 5" (1 715 m)   Wt 77 7 kg (171 lb 4 oz)   SpO2 97%   BMI 26 43 kg/m²        Physical Exam     Physical Exam   Constitutional: She is oriented to person, place, and time  She appears well-developed and well-nourished  Non-toxic appearance  She does not appear ill  No distress  HENT:   Head: Normocephalic and atraumatic  Right Ear: Tympanic membrane and external ear normal  No middle ear effusion  Left Ear: Tympanic membrane and external ear normal   No middle ear effusion  Mouth/Throat: No oral lesions  No uvula swelling  Posterior oropharyngeal erythema present  No oropharyngeal exudate, posterior oropharyngeal edema or tonsillar abscesses  Tonsils are 0 on the right  Tonsils are 0 on the left  No tonsillar exudate  Bilateral tonsillar erythema with +1 soft tissue swelling  No exudate  Bilateral nasal congestion and erythema with no discharge  Eyes: Pupils are equal, round, and reactive to light  EOM are normal    Pulmonary/Chest: Effort normal and breath sounds normal  No stridor  She has no wheezes  She has no rhonchi  She has no rales  Lymphadenopathy:     She has cervical adenopathy  Neurological: She is alert and oriented to person, place, and time  Skin: Skin is warm and dry  Psychiatric: She has a normal mood and affect  Her behavior is normal    Nursing note and vitals reviewed

## 2019-09-05 ENCOUNTER — OFFICE VISIT (OUTPATIENT)
Dept: FAMILY MEDICINE CLINIC | Facility: CLINIC | Age: 79
End: 2019-09-05
Payer: MEDICARE

## 2019-09-05 VITALS
WEIGHT: 171 LBS | OXYGEN SATURATION: 96 % | HEART RATE: 54 BPM | SYSTOLIC BLOOD PRESSURE: 110 MMHG | DIASTOLIC BLOOD PRESSURE: 60 MMHG | TEMPERATURE: 98.7 F | BODY MASS INDEX: 26.39 KG/M2

## 2019-09-05 DIAGNOSIS — J00 ACUTE NASOPHARYNGITIS: Primary | ICD-10-CM

## 2019-09-05 LAB — S PYO AG THROAT QL: NEGATIVE

## 2019-09-05 PROCEDURE — 87880 STREP A ASSAY W/OPTIC: CPT | Performed by: PHYSICIAN ASSISTANT

## 2019-09-05 PROCEDURE — 99213 OFFICE O/P EST LOW 20 MIN: CPT | Performed by: PHYSICIAN ASSISTANT

## 2019-09-05 NOTE — PROGRESS NOTES
Assessment/Plan:     Diagnoses and all orders for this visit:    Acute nasopharyngitis  Discussed likely viral illness, likely unrelated to recent strep treated with ZPak  POCT Strep Negative  CENTOR score 0/5   - advised to continue OTC supportive care with Tylenol, Mucinex (avoiding DM) and saline gargle   - encouraged rest and fluid intake   - educated Pt that cough can take 10-14 days total to resolve  - directed to return if fever over 102, symptoms persist for 14 days, thick productive cough  - Patient will call back in 4 days with update  Will discussed options of she is still symptomatic  Subjective:    Patient ID: Allegra Geurin is a [de-identified] y o  female  Pt is presenting today for Sore throat starting yesterday with a 2 week cough  Denies any fever, chills, N/V/D  She was diagnosed at Urgent Care with testing positive strep pharyngitis 2 5 weeks ago and treated with ZPak  She felt 85% better but continued with the cough  URI    There has been no fever  Associated symptoms include coughing and a sore throat  Pertinent negatives include no abdominal pain, congestion, ear pain, headaches, joint pain, nausea, plugged ear sensation, rhinorrhea or sinus pain  She has tried nothing for the symptoms  The following portions of the patient's history were reviewed and updated as appropriate: allergies, current medications and problem list     Review of Systems   HENT: Positive for sore throat  Negative for congestion, ear pain, rhinorrhea and sinus pain  Respiratory: Positive for cough  Gastrointestinal: Negative for abdominal pain and nausea  Musculoskeletal: Negative for joint pain  Neurological: Negative for headaches  Objective:  /60   Pulse (!) 54   Temp 98 7 °F (37 1 °C)   Wt 77 6 kg (171 lb)   SpO2 96%   PF 98 L/min   BMI 26 39 kg/m²      Physical Exam   Constitutional: She is oriented to person, place, and time  She appears well-developed and well-nourished   No distress  HENT:   Head: Normocephalic and atraumatic  Right Ear: Tympanic membrane, external ear and ear canal normal    Left Ear: Tympanic membrane, external ear and ear canal normal    Mouth/Throat: Oropharynx is clear and moist  No oropharyngeal exudate  Eyes: Pupils are equal, round, and reactive to light  Neck: Normal range of motion  Neck supple  Cardiovascular: Normal rate, regular rhythm, normal heart sounds and intact distal pulses  Exam reveals no gallop and no friction rub  No murmur heard  Pulmonary/Chest: Effort normal and breath sounds normal  No respiratory distress  She has no wheezes  She has no rales  Lymphadenopathy:     She has no cervical adenopathy  Neurological: She is alert and oriented to person, place, and time  Skin: She is not diaphoretic  Psychiatric: She has a normal mood and affect  Her behavior is normal  Thought content normal    Vitals reviewed

## 2019-11-29 ENCOUNTER — OFFICE VISIT (OUTPATIENT)
Dept: FAMILY MEDICINE CLINIC | Facility: CLINIC | Age: 79
End: 2019-11-29
Payer: MEDICARE

## 2019-11-29 VITALS
HEIGHT: 67 IN | RESPIRATION RATE: 17 BRPM | SYSTOLIC BLOOD PRESSURE: 128 MMHG | TEMPERATURE: 97.6 F | WEIGHT: 172 LBS | OXYGEN SATURATION: 98 % | HEART RATE: 60 BPM | DIASTOLIC BLOOD PRESSURE: 60 MMHG | BODY MASS INDEX: 27 KG/M2

## 2019-11-29 DIAGNOSIS — R42 LIGHTHEADEDNESS: ICD-10-CM

## 2019-11-29 DIAGNOSIS — I49.3 PVC (PREMATURE VENTRICULAR CONTRACTION): ICD-10-CM

## 2019-11-29 DIAGNOSIS — I10 ESSENTIAL HYPERTENSION: ICD-10-CM

## 2019-11-29 DIAGNOSIS — R00.2 PALPITATIONS: Primary | ICD-10-CM

## 2019-11-29 PROCEDURE — 99214 OFFICE O/P EST MOD 30 MIN: CPT | Performed by: FAMILY MEDICINE

## 2019-11-29 NOTE — PROGRESS NOTES
Assessment/Plan:     Diagnoses and all orders for this visit:    Palpitations    PVC (premature ventricular contraction)    Lightheadedness    Essential hypertension    Other orders  -     Cancel: influenza vaccine, 8911-6610, high-dose, PF 0 5 mL (FLUZONE HIGH-DOSE)          Cut back or eliminate caffeine  Stay hydrated  I suggested a 24 hour Holter monitor  She declined for now  She has a follow up visit with me 01/2020 with repeat labs  Re leg edema suspect secondary to Ca++ channel blocker  No changes in medications for now  I recommended compression stockings  Patient ID: Nisreen Ahmadi is a [de-identified] y o  female  Patient presents complaining of intermittent palpitations described as a fluttering  Symptoms more noticeable after eating chocolate  No chest pain or chest pressure  No shortness of breath  Transient lightheadedness not related the palpitations usually with position change  Medications reviewed  Last labs 05/2019 see note  hypertension BPs have been stable on current 3 drug regimen  on Amlodipine 2 5 mg daily, Valsartan 320 mg daily and Metoprolol 50 mg BID  Past history of hyponatremia and hypokalemia on HCTZ  creatinine 0 79  electrolytes normal  K+ 3 9  Hgb 12  9    lipid profile cholesterol 193  TGs 95  HDL 55    TSH 3 550   increased from 96  + FH type DM  Physically active exercises regularly  The following portions of the patient's history were reviewed and updated as appropriate: allergies, current medications, past family history, past medical history, past social history, past surgical history and problem list     Review of Systems   Constitutional: Negative for appetite change, chills, fatigue, fever and unexpected weight change  HENT: Negative for congestion, ear pain, hearing loss, rhinorrhea, sore throat and trouble swallowing  Chronic nasal congestion improved with Flonase  Eyes: Negative for visual disturbance     Respiratory: Negative for cough, shortness of breath and wheezing  Cardiovascular: Positive for leg swelling  Negative for chest pain and palpitations  Mild leg edema improves with elevation  On Ca++ channel blocker  admission 05/2014 at [facility] with left jaw and arm pain  Normal EKG  she ruled out for MI  Nuclear stress test prior to discharge normal EF 65%  Gastrointestinal: Negative for abdominal pain, blood in stool, constipation, diarrhea, nausea and vomiting  Chronic constipation colonoscopy 03/2019 normal except for diverticulosis  + FH colon CA sister  Endocrine: Negative for polydipsia and polyuria  Past history of borderline abnormal TSH 05/2019 TSH 3 550   06/2017  TSH 3 840  repeat TSH 07/2017 2 630  thyroid ABs negative  05/2018 TSH 2 830   02/2015 DEXA scan normal    Genitourinary: Negative for difficulty urinating  No UI  history of recurrent UTIs followed by urology  On Hiprex  02/2019 kidney bladder ultrasound normal minimal PVR  29 ML  Simple cyst left adnexa without significant change  pelvic u/s 08/2016 stable simple left ovarian cyst  01/2017  normal at 9 5 followed by GYN ,    Musculoskeletal: Negative for arthralgias and myalgias  Prior orthopedic evaluation for right knee pain including MRI  meniscal tear  no improvement with steroid injection  she had a 2nd opinion  not a candidate for TKR  right knee x rays OA  no pain at present,   Skin: Negative for rash  Allergic/Immunologic: Positive for environmental allergies  Neurological: Negative for dizziness and headaches  Hematological: Negative for adenopathy  Does not bruise/bleed easily  Past history of mild leukopenia   05/2019 CBC WBC 4880  Hemoglobin 12 9  MCV 95  Platelet count 947,531   06/2018 CBC WBC 5790  Hemoglobin 13  MCV 95  Platelet count [id number]  Vitamin B12 589  Folate greater than 20   05/2018 CBC WBC 3,900  Hgb 13  Platelets 815,055   97/0798 CBC WBC 5270  Hemoglobin 13  1  Platelet count [id number]   06/2017 CBC WBC 4,870  Hemoglobin 12 9  Platelet count 960,019   Psychiatric/Behavioral: Negative for dysphoric mood and sleep disturbance  Objective:      /60 (BP Location: Left arm, Patient Position: Sitting, Cuff Size: Large)   Pulse 60   Temp 97 6 °F (36 4 °C)   Resp 17   Ht 5' 7" (1 702 m)   Wt 78 kg (172 lb)   SpO2 98%   Breastfeeding? No   BMI 26 94 kg/m²          Physical Exam   Constitutional: She is oriented to person, place, and time  She appears well-developed and well-nourished  No distress  HENT:   Mouth/Throat: Oropharynx is clear and moist and mucous membranes are normal  No oral lesions  Normal dentition  Eyes: Pupils are equal, round, and reactive to light  Conjunctivae and EOM are normal  No scleral icterus  Neck: No JVD present  Carotid bruit is not present  No tracheal deviation present  No thyroid mass and no thyromegaly present  Cardiovascular: Normal rate, regular rhythm and normal heart sounds  Exam reveals no gallop  No murmur heard  Occasional ectopy  Pulmonary/Chest: Effort normal and breath sounds normal  No respiratory distress  She has no wheezes  She has no rales  Abdominal: Soft  Bowel sounds are normal  She exhibits no distension, no abdominal bruit and no mass  There is no hepatosplenomegaly  There is no tenderness  There is no rebound and no guarding  Musculoskeletal: Normal range of motion  She exhibits edema (trace pre tibial edema  no calf tenderness)  She exhibits no deformity  Lymphadenopathy:     She has no cervical adenopathy  Neurological: She is alert and oriented to person, place, and time  She displays normal reflexes  No cranial nerve deficit  Skin: No rash noted  No cyanosis  Nails show no clubbing  Psychiatric: She has a normal mood and affect  Nursing note and vitals reviewed  EKG NSR  Occasional PVCs  WV 0 142  QRS 0 88  QT 0 434           Recent Results (from the past 5040 hour(s)) CBC and differential    Collection Time: 05/21/19  8:16 AM   Result Value Ref Range    WBC 4 88 4 31 - 10 16 Thousand/uL    RBC 4 20 3 81 - 5 12 Million/uL    Hemoglobin 12 9 11 5 - 15 4 g/dL    Hematocrit 39 9 34 8 - 46 1 %    MCV 95 82 - 98 fL    MCH 30 7 26 8 - 34 3 pg    MCHC 32 3 31 4 - 37 4 g/dL    RDW 11 7 11 6 - 15 1 %    MPV 10 4 8 9 - 12 7 fL    Platelets 731 804 - 939 Thousands/uL    nRBC 0 /100 WBCs    Neutrophils Relative 55 43 - 75 %    Immat GRANS % 0 0 - 2 %    Lymphocytes Relative 29 14 - 44 %    Monocytes Relative 12 4 - 12 %    Eosinophils Relative 3 0 - 6 %    Basophils Relative 1 0 - 1 %    Neutrophils Absolute 2 69 1 85 - 7 62 Thousands/µL    Immature Grans Absolute 0 01 0 00 - 0 20 Thousand/uL    Lymphocytes Absolute 1 42 0 60 - 4 47 Thousands/µL    Monocytes Absolute 0 56 0 17 - 1 22 Thousand/µL    Eosinophils Absolute 0 14 0 00 - 0 61 Thousand/µL    Basophils Absolute 0 06 0 00 - 0 10 Thousands/µL   Comprehensive metabolic panel    Collection Time: 05/21/19  8:16 AM   Result Value Ref Range    Sodium 138 136 - 145 mmol/L    Potassium 3 9 3 5 - 5 3 mmol/L    Chloride 106 100 - 108 mmol/L    CO2 27 21 - 32 mmol/L    ANION GAP 5 4 - 13 mmol/L    BUN 15 5 - 25 mg/dL    Creatinine 0 79 0 60 - 1 30 mg/dL    Glucose, Fasting 101 (H) 65 - 99 mg/dL    Calcium 8 2 (L) 8 3 - 10 1 mg/dL    AST 18 5 - 45 U/L    ALT 27 12 - 78 U/L    Alkaline Phosphatase 67 46 - 116 U/L    Total Protein 6 5 6 4 - 8 2 g/dL    Albumin 3 3 (L) 3 5 - 5 0 g/dL    Total Bilirubin 0 68 0 20 - 1 00 mg/dL    eGFR 71 ml/min/1 73sq m   Lipid panel    Collection Time: 05/21/19  8:16 AM   Result Value Ref Range    Cholesterol 193 50 - 200 mg/dL    Triglycerides 95 <=150 mg/dL    HDL, Direct 52 40 - 60 mg/dL    LDL Calculated 122 (H) 0 - 100 mg/dL    Non-HDL-Chol (CHOL-HDL) 141 mg/dl   TSH, 3rd generation with Free T4 reflex    Collection Time: 05/21/19  8:16 AM   Result Value Ref Range    TSH 3RD GENERATON 3 550 0 358 - 3 740 uIU/mL POCT urine dip    Collection Time: 07/19/19  4:20 PM   Result Value Ref Range    LEUKOCYTE ESTERASE,UA negative     NITRITE,UA negative     SL AMB POCT UROBILINOGEN 0 2     POCT URINE PROTEIN negative      PH,UA 6 0     BLOOD,UA negative     SPECIFIC GRAVITY,UA 1 015     KETONES,UA negative     BILIRUBIN,UA negative     GLUCOSE, UA negative      COLOR,UA yellow     CLARITY,UA clear    Urine culture    Collection Time: 07/19/19  4:42 PM   Result Value Ref Range    Urine Culture <10,000 cfu/ml     POCT rapid strepA    Collection Time: 08/18/19  8:33 AM   Result Value Ref Range     RAPID STREP A Positive (A) Negative   POCT rapid strepA    Collection Time: 09/05/19 10:17 AM   Result Value Ref Range     RAPID STREP A Negative Negative

## 2019-12-21 ENCOUNTER — APPOINTMENT (OUTPATIENT)
Dept: LAB | Age: 79
End: 2019-12-21
Payer: MEDICARE

## 2019-12-21 DIAGNOSIS — R73.01 IFG (IMPAIRED FASTING GLUCOSE): ICD-10-CM

## 2019-12-21 LAB
EST. AVERAGE GLUCOSE BLD GHB EST-MCNC: 114 MG/DL
GLUCOSE P FAST SERPL-MCNC: 91 MG/DL (ref 65–99)
HBA1C MFR BLD: 5.6 % (ref 4.2–6.3)

## 2019-12-21 PROCEDURE — 36415 COLL VENOUS BLD VENIPUNCTURE: CPT | Performed by: FAMILY MEDICINE

## 2019-12-21 PROCEDURE — 83036 HEMOGLOBIN GLYCOSYLATED A1C: CPT | Performed by: FAMILY MEDICINE

## 2019-12-21 PROCEDURE — 82947 ASSAY GLUCOSE BLOOD QUANT: CPT

## 2019-12-23 ENCOUNTER — TELEPHONE (OUTPATIENT)
Dept: FAMILY MEDICINE CLINIC | Facility: CLINIC | Age: 79
End: 2019-12-23

## 2019-12-23 NOTE — TELEPHONE ENCOUNTER
Call re labs repeat FBS normal at 91 < 100  A1c 5 6  Normal 5 7 or less             Recent Results (from the past 168 hour(s))   Hemoglobin A1C    Collection Time: 12/21/19  9:35 AM   Result Value Ref Range    Hemoglobin A1C 5 6 4 2 - 6 3 %     mg/dl   Glucose, fasting    Collection Time: 12/21/19 11:23 AM   Result Value Ref Range    Glucose, Fasting 91 65 - 99 mg/dL

## 2020-01-08 ENCOUNTER — OFFICE VISIT (OUTPATIENT)
Dept: FAMILY MEDICINE CLINIC | Facility: CLINIC | Age: 80
End: 2020-01-08
Payer: MEDICARE

## 2020-01-08 VITALS
RESPIRATION RATE: 16 BRPM | BODY MASS INDEX: 27.15 KG/M2 | DIASTOLIC BLOOD PRESSURE: 68 MMHG | WEIGHT: 173 LBS | SYSTOLIC BLOOD PRESSURE: 122 MMHG | TEMPERATURE: 98.4 F | HEIGHT: 67 IN | HEART RATE: 56 BPM

## 2020-01-08 DIAGNOSIS — I10 ESSENTIAL HYPERTENSION: Primary | ICD-10-CM

## 2020-01-08 DIAGNOSIS — N83.202 CYST OF LEFT OVARY: ICD-10-CM

## 2020-01-08 DIAGNOSIS — E78.00 PURE HYPERCHOLESTEROLEMIA: ICD-10-CM

## 2020-01-08 DIAGNOSIS — R73.01 IFG (IMPAIRED FASTING GLUCOSE): ICD-10-CM

## 2020-01-08 DIAGNOSIS — D70.9 NEUTROPENIA, UNSPECIFIED TYPE (HCC): ICD-10-CM

## 2020-01-08 PROBLEM — J02.0 STREP PHARYNGITIS: Status: RESOLVED | Noted: 2019-08-18 | Resolved: 2020-01-08

## 2020-01-08 PROCEDURE — 99214 OFFICE O/P EST MOD 30 MIN: CPT | Performed by: FAMILY MEDICINE

## 2020-01-08 NOTE — PROGRESS NOTES
Assessment/Plan:     Diagnoses and all orders for this visit:    Essential hypertension  -     CBC and differential  -     Comprehensive metabolic panel    IFG (impaired fasting glucose)  -     Hemoglobin A1C    Pure hypercholesterolemia    Neutropenia, unspecified type ([address] )    Cyst of left ovary          Continue with current medications  OV 6 months with repeat labs at that time  Up to date with flu vaccine  Patient ID: Shruthi Diehl is a [de-identified] y o  female  Followup visit  Medications reviewed  Hypertension BPs have been stable on current 3 drug regimen  on Amlodipine 2 5 mg daily, Valsartan 320 mg daily and Metoprolol 50 mg BID  Past history of hyponatremia and hypokalemia on HCTZ  05/2019  creatinine 0 79  electrolytes normal  K+ 3 9  Hgb 12  9    lipid profile cholesterol 193  TGs 95  HDL 55    TSH 3 550   12/2019 FBS 91 decreased from 101  A1c 5 6 + FH type DM  Physically active exercises regularly  The following portions of the patient's history were reviewed and updated as appropriate: allergies, current medications, past family history, past medical history, past social history, past surgical history and problem list     Review of Systems   Constitutional: Negative for appetite change, chills, fatigue, fever and unexpected weight change  HENT: Negative for congestion, ear pain, hearing loss, rhinorrhea, sore throat and trouble swallowing  Chronic nasal congestion improved with Flonase  Eyes: Negative for visual disturbance  Respiratory: Negative for cough, shortness of breath and wheezing  Cardiovascular: Negative for chest pain, palpitations and leg swelling  admission 05/2014 at [facility] with left jaw and arm pain  Normal EKG  she ruled out for MI  Nuclear stress test prior to discharge normal    Gastrointestinal: Negative for abdominal pain, blood in stool, constipation, diarrhea, nausea and vomiting          Chronic constipation colonoscopy 03/2019 normal except for diverticulosis  + FH colon CA sister  Endocrine: Negative for polydipsia and polyuria  Past history of borderline abnormal TSH 05/2019 TSH 3 550   06/2017  TSH 3 840  repeat TSH 07/2017 2 630  thyroid ABs negative  05/2018 TSH 2 830   02/2015 DEXA scan normal    Genitourinary: Negative for difficulty urinating  No UI  history of recurrent UTIs followed by urology  On Hiprex  02/2019 kidney bladder ultrasound normal minimal PVR  29 ML  Simple cyst left adnexa without significant change  pelvic u/s 08/2016 stable simple left ovarian cyst  01/2017  normal at 9 5 followed by GYN ,    Musculoskeletal: Negative for arthralgias and myalgias  Prior orthopedic evaluation for right knee pain including MRI  meniscal tear  no improvement with steroid injection  she had a 2nd opinion  not a candidate for TKR  right knee x rays OA  no pain at present,   Skin: Negative for rash  Allergic/Immunologic: Positive for environmental allergies  Neurological: Negative for dizziness and headaches  Hematological: Negative for adenopathy  Does not bruise/bleed easily  Past history of mild leukopenia   05/2019 CBC WBC 4880  Hemoglobin 12 9  MCV 95  Platelet count 709,218   06/2018 CBC WBC 5790  Hemoglobin 13  MCV 95  Platelet count [id number]  Vitamin B12 589  Folate greater than 20   05/2018 CBC WBC 3,900  Hgb 13  Platelets 831,409   57/8969 CBC WBC 5270  Hemoglobin 13 1  Platelet count [id number]   06/2017 CBC WBC 4,870  Hemoglobin 12 9  Platelet count 975,934   Psychiatric/Behavioral: Negative for dysphoric mood and sleep disturbance  Objective:      /68   Pulse 56   Temp 98 4 °F (36 9 °C)   Resp 16   Ht 5' 7 2" (1 707 m)   Wt 78 5 kg (173 lb)   BMI 26 93 kg/m²          Physical Exam   Constitutional: She is oriented to person, place, and time  She appears well-developed and well-nourished  No distress     HENT:   Mouth/Throat: Oropharynx is clear and moist and mucous membranes are normal  No oral lesions  Normal dentition  Eyes: Pupils are equal, round, and reactive to light  Conjunctivae and EOM are normal  No scleral icterus  Neck: No JVD present  Carotid bruit is not present  No tracheal deviation present  No thyroid mass and no thyromegaly present  Cardiovascular: Normal rate, regular rhythm and normal heart sounds  Exam reveals no gallop  No murmur heard  Apical HR 60   Pulmonary/Chest: Effort normal and breath sounds normal  No respiratory distress  She has no wheezes  She has no rales  Abdominal: Soft  Bowel sounds are normal  She exhibits no distension, no abdominal bruit and no mass  There is no hepatosplenomegaly  There is no tenderness  There is no rebound and no guarding  Musculoskeletal: She exhibits no edema  Lymphadenopathy:     She has no cervical adenopathy  Neurological: She is alert and oriented to person, place, and time  No cranial nerve deficit  Skin: No rash noted  No cyanosis  Nails show no clubbing  Psychiatric: She has a normal mood and affect  Nursing note and vitals reviewed

## 2020-01-16 DIAGNOSIS — I10 ESSENTIAL HYPERTENSION: ICD-10-CM

## 2020-01-16 RX ORDER — IRBESARTAN 300 MG/1
300 TABLET ORAL
Qty: 90 TABLET | Refills: 3 | Status: SHIPPED | OUTPATIENT
Start: 2020-01-16 | End: 2020-01-16 | Stop reason: SDUPTHER

## 2020-01-16 RX ORDER — AMLODIPINE BESYLATE 2.5 MG/1
2.5 TABLET ORAL 2 TIMES DAILY
Qty: 180 TABLET | Refills: 3 | Status: SHIPPED | OUTPATIENT
Start: 2020-01-16 | End: 2020-01-16 | Stop reason: SDUPTHER

## 2020-01-16 RX ORDER — METOPROLOL TARTRATE 50 MG/1
50 TABLET, FILM COATED ORAL 2 TIMES DAILY
Qty: 180 TABLET | Refills: 3 | Status: SHIPPED | OUTPATIENT
Start: 2020-01-16 | End: 2021-01-07 | Stop reason: SDUPTHER

## 2020-01-16 RX ORDER — METOPROLOL TARTRATE 50 MG/1
50 TABLET, FILM COATED ORAL 2 TIMES DAILY
Qty: 180 TABLET | Refills: 3 | Status: SHIPPED | OUTPATIENT
Start: 2020-01-16 | End: 2020-01-16 | Stop reason: SDUPTHER

## 2020-01-16 RX ORDER — AMLODIPINE BESYLATE 2.5 MG/1
2.5 TABLET ORAL 2 TIMES DAILY
Qty: 180 TABLET | Refills: 3 | Status: SHIPPED | OUTPATIENT
Start: 2020-01-16 | End: 2021-01-07 | Stop reason: SDUPTHER

## 2020-01-16 RX ORDER — IRBESARTAN 300 MG/1
300 TABLET ORAL
Qty: 90 TABLET | Refills: 3 | Status: SHIPPED | OUTPATIENT
Start: 2020-01-16 | End: 2021-01-07 | Stop reason: SDUPTHER

## 2020-01-16 NOTE — TELEPHONE ENCOUNTER
Patient requested refills at last OV on    AMLODIPINE 2 5 mg  1 pill twice daily  #180    IRBESARTAN 300 mg  1 pill daily at bedtime  #90    METOPROLOL 50 mg   1 pill 2 times daily  #180    [facility] pharmacy

## 2020-01-24 ENCOUNTER — OFFICE VISIT (OUTPATIENT)
Dept: URGENT CARE | Age: 80
End: 2020-01-24
Payer: MEDICARE

## 2020-01-24 VITALS
DIASTOLIC BLOOD PRESSURE: 79 MMHG | OXYGEN SATURATION: 97 % | SYSTOLIC BLOOD PRESSURE: 170 MMHG | TEMPERATURE: 98 F | RESPIRATION RATE: 18 BRPM | HEART RATE: 73 BPM

## 2020-01-24 DIAGNOSIS — R07.89 CHEST DISCOMFORT: Primary | ICD-10-CM

## 2020-01-24 DIAGNOSIS — M79.602 LEFT ARM PAIN: ICD-10-CM

## 2020-01-24 PROCEDURE — G0463 HOSPITAL OUTPT CLINIC VISIT: HCPCS | Performed by: FAMILY MEDICINE

## 2020-01-24 PROCEDURE — 99213 OFFICE O/P EST LOW 20 MIN: CPT | Performed by: FAMILY MEDICINE

## 2020-01-24 PROCEDURE — 93005 ELECTROCARDIOGRAM TRACING: CPT | Performed by: FAMILY MEDICINE

## 2020-01-24 NOTE — PATIENT INSTRUCTIONS
Patient with mid chest discomfort and left arm pain since Wednesday (01/22/2028); patient helped to the exam litter and placed on oxygen via nasal cannula at 2 liters/minute; ECG done (trigeminbilly); will send patient to the hospital emergency department now by ambulance for further evaluation and care (patient requests going to the [facility] Emergency Department); ambulance crew given a copy of the ECG done here

## 2020-01-24 NOTE — PROGRESS NOTES
[address] [facility]        NAME: Sebastian Aviles is a [de-identified] y o  female  : 1940    MRN: [id number]  DATE: 2020  TIME: 2:02 PM    Assessment and Plan   Chest discomfort [R07 89]  1  Chest discomfort  POCT ECG    Transfer to other facility   2  Left arm pain  POCT ECG    Transfer to other facility         Patient Instructions     Patient Instructions   Patient with mid chest discomfort and left arm pain since Wednesday (2028); patient helped to the exam litter and placed on oxygen via nasal cannula at 2 liters/minute; ECG done (trigeminy); will send patient to the hospital emergency department now by ambulance for further evaluation and care (patient requests going to the [facility] Emergency Department); ambulance crew given a copy of the ECG done here  Chief Complaint     Chief Complaint   Patient presents with    Abdominal Pain     c/o dull intermittent achey pain to epigastric area, denies N/V, denies SOB   c/o left arm soreness  with use of tylenol and aleve  History of Present Illness       Patient with mid chest discomfort and left arm pain since Wednesday (2020); patient thought she ate some greasy food, or that she worked out at the gym too hard this past Monday (2020) patient states family history of cardiac problems      Review of Systems   Review of Systems   HENT: Negative  Respiratory: Negative  Cardiovascular: Positive for chest pain  Gastrointestinal: Negative  Musculoskeletal:        Left arm pain   Neurological: Negative            Current Medications       Current Outpatient Medications:     amLODIPine (NORVASC) 2 5 mg tablet, Take 1 tablet (2 5 mg total) by mouth 2 (two) times a day, Disp: 180 tablet, Rfl: 3    Calcium Carb-Cholecalciferol (CALCIUM 1000 + D) 1000-800 MG-UNIT TABS, Take by mouth, Disp: , Rfl:     Cholecalciferol 2000 units CAPS, Take 1 capsule by mouth, Disp: , Rfl:     irbesartan (AVAPRO) 300 mg tablet, Take 1 tablet (300 mg total) by mouth daily at bedtime, Disp: 90 tablet, Rfl: 3    methenamine hippurate (HIPREX) 1 g tablet, Take 1 g by mouth, Disp: , Rfl:     metoprolol tartrate (LOPRESSOR) 50 mg tablet, Take 1 tablet (50 mg total) by mouth 2 (two) times a day, Disp: 180 tablet, Rfl: 3    multivitamin (THERAGRAN) TABS, Take 1 tablet by mouth, Disp: , Rfl:     Current Allergies     Allergies as of 01/24/2020 - Reviewed 01/24/2020   Allergen Reaction Noted    Penicillins Rash 06/10/2005            The following portions of the patient's history were reviewed and updated as appropriate: allergies, current medications, past family history, past medical history, past social history, past surgical history and problem list      Past Medical History:   Diagnosis Date    Accelerated essential hypertension     last assessed 08/17/2015    Depression with anxiety     last assesed 08/16/2012    Strep pharyngitis 8/18/2019       Past Surgical History:   Procedure Laterality Date    BLEPHAROPLASTY      upper lid w/excessive skin    CARDIOVASCULAR STRESS TEST      onset June 2005    COLONOSCOPY      DILATION AND CURETTAGE, DIAGNOSTIC / THERAPEUTIC      ENDOMETRIAL BIOPSY      negative for dysplasia, hyperplasia and malignancy, resolved 03/25/2013    TUBAL LIGATION         Family History   Problem Relation Age of Onset    Heart disease Father         cardiac disorder    Skin cancer Father     Colon cancer Sister 80    Heart disease Family     Hypertension Family     Coronary artery disease Family     No Known Problems Daughter     No Known Problems Daughter     No Known Problems Daughter     No Known Problems Mother          Medications have been verified  Objective   /79   Pulse 73   Temp 98 °F (36 7 °C)   Resp 18   SpO2 97%        Physical Exam     Physical Exam   Constitutional: She is oriented to person, place, and time     HENT:   Mouth/Throat: Oropharynx is clear and moist  Cardiovascular: Normal rate, regular rhythm and normal heart sounds  Pulmonary/Chest: Effort normal and breath sounds normal    Abdominal: Soft  Normal appearance and bowel sounds are normal    Neurological: She is alert and oriented to person, place, and time  No nuchal rigidity   Skin:   Fair color and turgor   Psychiatric: She has a normal mood and affect  Her behavior is normal    Nursing note and vitals reviewed          ECG - trigeminy

## 2020-01-27 ENCOUNTER — TELEPHONE (OUTPATIENT)
Dept: FAMILY MEDICINE CLINIC | Facility: CLINIC | Age: 80
End: 2020-01-27

## 2020-01-27 LAB
ATRIAL RATE: 73 BPM
P AXIS: 59 DEGREES
PR INTERVAL: 152 MS
QRS AXIS: 46 DEGREES
QRSD INTERVAL: 90 MS
QT INTERVAL: 394 MS
QTC INTERVAL: 434 MS
T WAVE AXIS: 49 DEGREES
VENTRICULAR RATE: 73 BPM

## 2020-01-27 PROCEDURE — 93010 ELECTROCARDIOGRAM REPORT: CPT | Performed by: INTERNAL MEDICINE

## 2020-01-27 NOTE — TELEPHONE ENCOUNTER
Patient scheduled TCM apt for 02- at 10:30 am with Dr Roxanne Hughes  She was admitted  On 01- [facility] and discharged 01- for chest pain

## 2020-01-28 ENCOUNTER — TRANSITIONAL CARE MANAGEMENT (OUTPATIENT)
Dept: FAMILY MEDICINE CLINIC | Facility: CLINIC | Age: 80
End: 2020-01-28

## 2020-01-28 NOTE — TELEPHONE ENCOUNTER
PATIENT INSTRUCTIONS  From the office of: Kevin Rocha MD, FACS  GENERAL, BREAST, AND LAPAROSCOPIC SURGERY  [place] [facility] [address] [facility]  [phone]  [place] [facility] [address]  [phone]    REASON FOR VISIT:  · Anal fistula.    PLAN:  · What you had was a martina-rectal abscess. It drained and left behind a fistula - which is a connection between the inside of the rectum and the skin.  · We will try to see if the fistula can be healed by treating with antibiotics and warm soaks (1-2 per day) for the next 10 days. It is unlikely to heal, but worth a try.  · If the fistula doesn't heal, we may need to consider placing a seton - which is a small rubber/plastic ribbon that we will tighten to pull through the connection and allow it to scar.    STUDIES:  · None.    WRITTEN BROCHURES:  · Hemorrhoids/Perianal problems.    FOLLOW-UP PLANS:  · 2 weeks for repeat exam and to check on the symptoms.    ~~~~ Please follow-up sooner if your symptoms or problems persist, worsen, or recur or if you have any issues or concerns. ~~~~    Contacting Dr. Rocha  · My clinic hours at 7:30-2:00 on Mondays ([place]) and Thursdays ([place]).   · If you have a life-threatening concern, you should call 911.  · If at any time you have any issues or concerns that are not life-threatening, you can call either clinic. During normal business hours, the reception staff will forward a message to my nursing staff. Your call will be returned by my nurses within a few business hours, even if I am not in clinic. Please alert the  if you feel you need something addressed more urgently.   · If you call after hours, the call center will take your call. They will ask questions and will page me if needed. If the call center is directing you to report to the ER for assessment of a concern that is not life-threatening, please ask them to page me so we can talk before you go to the ER.   · If I am out of town, one of my partners will  Patient called back, TCM completed  respond to the call (Dr. Nunez, Dr. Patel, or Dr. Simons).

## 2020-02-04 ENCOUNTER — OFFICE VISIT (OUTPATIENT)
Dept: FAMILY MEDICINE CLINIC | Facility: CLINIC | Age: 80
End: 2020-02-04
Payer: MEDICARE

## 2020-02-04 VITALS
SYSTOLIC BLOOD PRESSURE: 132 MMHG | DIASTOLIC BLOOD PRESSURE: 70 MMHG | BODY MASS INDEX: 25.77 KG/M2 | WEIGHT: 174 LBS | RESPIRATION RATE: 16 BRPM | TEMPERATURE: 98.6 F | HEIGHT: 69 IN | HEART RATE: 60 BPM

## 2020-02-04 DIAGNOSIS — N30.00 ACUTE CYSTITIS WITHOUT HEMATURIA: ICD-10-CM

## 2020-02-04 DIAGNOSIS — E78.00 PURE HYPERCHOLESTEROLEMIA: ICD-10-CM

## 2020-02-04 DIAGNOSIS — R07.9 CHEST PAIN, UNSPECIFIED TYPE: Primary | ICD-10-CM

## 2020-02-04 DIAGNOSIS — I10 ESSENTIAL HYPERTENSION: ICD-10-CM

## 2020-02-04 DIAGNOSIS — K21.9 GASTROESOPHAGEAL REFLUX DISEASE WITHOUT ESOPHAGITIS: ICD-10-CM

## 2020-02-04 PROCEDURE — 99495 TRANSJ CARE MGMT MOD F2F 14D: CPT | Performed by: FAMILY MEDICINE

## 2020-02-04 NOTE — PROGRESS NOTES
TCM Call (since 1/4/2020)     Date and time call was made  1/28/2020 10:32 AM    Hospital care reviewed  Records reviewed    Patient was hospitialized at  [facility]    Date of Admission  01/24/20    Date of discharge  01/25/20    Diagnosis  Chest Pain    Disposition  Home    Were the patients medications reviewed and updated  No    Current Symptoms  None      TCM Call (since 1/4/2020)     Post hospital issues  None    Should patient be enrolled in anticoag monitoring? No    Scheduled for follow up? Yes    Did you obtain your prescribed medications  Yes    Do you need help managing your prescriptions or medications  No    Is transportation to your appointment needed  No    I have advised the patient to call PCP with any new or worsening symptoms  Michaelle Newberry [address]    The type of support provided  Emotional; Physical    Do you have social support  Yes, as much as I need    Are you recieving any outpatient services  No    Are you recieving home care services  No    Are you using any community resources  No    Current waiver services  No    Have you fallen in the last 12 months  No    Interperter language line needed  No    Counseling  Patient          Assessment/Plan:     Diagnoses and all orders for this visit:    Chest pain, unspecified type    Acute cystitis without hematuria    Essential hypertension    Pure hypercholesterolemia    Gastroesophageal reflux disease without esophagitis          Trial of Aleve 1-2 tablets BID with food x 7 days for suspected muscle strain  Advised to call if no improvement in one week  Consider GI evaluation for persistent symptoms  Continue with current medications  Patient ID: Trinity Lynn is a [address] y o  female  Followup visit  TCM s/p admission for substernal chest pain  Patient was initialy seen at urgent care  EKG NSR with frequent PVCs  she was sent to [facility]   Initial EKG NSR with fusion complexes  No acute changes  2nd EKG sinus bradycardia with PACs  troponins x 3 negative  Chest x-ray no active disease  Stress echocardiogram normal   EF 60%  Patient reports that she had not worked out for 1 month  She went back to the gym and was working out with weights the days preceding her admission  She is still having pain intermittently with certain movements  No pleuritic pain  No shortness of breath  No reflux symptoms  No dysphagia  Hypertension BPs have been stable on current 3 drug regimen  on Amlodipine 2 5 mg daily, Valsartan 320 mg daily and Metoprolol 50 mg BID  Past history of hyponatremia and hypokalemia on HCTZ  05/2019  creatinine 0 79  electrolytes normal  K+ 3 9  Hgb 12  9    lipid profile cholesterol 193  TGs 95  HDL 55    TSH 3 550   12/2019 FBS 91 decreased from 101  A1c 5 6 + FH type DM  Physically active exercises regularly  The following portions of the patient's history were reviewed and updated as appropriate: allergies, current medications, past family history, past medical history, past social history, past surgical history and problem list     Review of Systems   Constitutional: Negative for appetite change, chills, fatigue, fever and unexpected weight change  HENT: Negative for congestion, ear pain, hearing loss, rhinorrhea, sore throat and trouble swallowing  Chronic nasal congestion improved with Flonase  Eyes: Negative for visual disturbance  Respiratory: Negative for cough, shortness of breath and wheezing  Cardiovascular: Negative for chest pain, palpitations and leg swelling  See HPI  admission 05/2014 at [facility] with left jaw and arm pain  Normal EKG  she ruled out for MI  Nuclear stress test prior to discharge normal    Gastrointestinal: Positive for constipation  Negative for abdominal pain, blood in stool, diarrhea, nausea and vomiting  Chronic constipation   Colonoscopy 03/2019 normal except for diverticulosis  + FH colon CA sister  Endocrine: Negative for polydipsia and polyuria  Past history of borderline abnormal TSH 05/2019 TSH 3 550   06/2017  TSH 3 840  repeat TSH 07/2017 2 630  thyroid ABs negative  05/2018 TSH 2 830   02/2015 DEXA scan normal    Genitourinary: Negative for difficulty urinating, dysuria and hematuria         + urine culture for E coli during her recent admission  she completed a 5 day course of Cipro   history of recurrent UTIs followed by urology  On Hiprex  02/2019 kidney bladder ultrasound normal minimal PVR  29 ML  Simple cyst left adnexa without significant change  pelvic u/s 08/2016 stable simple left ovarian cyst  01/2017  normal at 9 5 followed by GYN ,    Musculoskeletal: Negative for arthralgias and myalgias  Prior orthopedic evaluation for right knee pain including MRI  meniscal tear  no improvement with steroid injection  she had a 2nd opinion  not a candidate for TKR  right knee x rays OA  no pain at present,   Skin: Negative for rash  Allergic/Immunologic: Positive for environmental allergies  Neurological: Negative for dizziness and headaches  Hematological: Negative for adenopathy  Does not bruise/bleed easily  Past history of mild leukopenia   05/2019 CBC WBC 4880  Hemoglobin 12 9  MCV 95  Platelet count 535,783   06/2018 CBC WBC 5790  Hemoglobin 13  MCV 95  Platelet count 331209  Vitamin B12 589  Folate greater than 20   05/2018 CBC WBC 3,900  Hgb 13  Platelets 807,126   61/6719 CBC WBC 5270  Hemoglobin 13 1  Platelet count 204053   06/2017 CBC WBC 4,870  Hemoglobin 12 9  Platelet count 937,869   Psychiatric/Behavioral: Negative for dysphoric mood and sleep disturbance  Objective:      /88   Pulse 60   Temp 98 6 °F (37 °C)   Resp 16   Ht 5' 8 6" (1 742 m)   Wt 78 9 kg (174 lb)   BMI 26 00 kg/m²          Physical Exam   Constitutional: She is oriented to person, place, and time   She appears well-developed and well-nourished  No distress  HENT:   Mouth/Throat: Oropharynx is clear and moist and mucous membranes are normal  No oral lesions  Normal dentition  Eyes: Pupils are equal, round, and reactive to light  Conjunctivae and EOM are normal  No scleral icterus  Neck: No JVD present  Carotid bruit is not present  No tracheal deviation present  No thyroid mass and no thyromegaly present  Cardiovascular: Normal rate, regular rhythm and normal heart sounds  Exam reveals no gallop  No murmur heard  Pulmonary/Chest: Effort normal and breath sounds normal  No respiratory distress  She has no wheezes  She has no rales  She exhibits no tenderness  No chest wall tenderness   Abdominal: Soft  Bowel sounds are normal  She exhibits no distension, no abdominal bruit and no mass  There is no hepatosplenomegaly  There is no tenderness  There is no rebound and no guarding  Musculoskeletal: Normal range of motion  She exhibits no edema or deformity  Lymphadenopathy:     She has no cervical adenopathy  Neurological: She is alert and oriented to person, place, and time  No cranial nerve deficit  Skin: No rash noted  No cyanosis  Nails show no clubbing  Psychiatric: She has a normal mood and affect  Nursing note and vitals reviewed

## 2020-02-19 ENCOUNTER — ANNUAL EXAM (OUTPATIENT)
Dept: OBGYN CLINIC | Facility: CLINIC | Age: 80
End: 2020-02-19
Payer: MEDICARE

## 2020-02-19 VITALS — BODY MASS INDEX: 25.7 KG/M2 | WEIGHT: 172 LBS | DIASTOLIC BLOOD PRESSURE: 66 MMHG | SYSTOLIC BLOOD PRESSURE: 120 MMHG

## 2020-02-19 DIAGNOSIS — Z01.419 ENCOUNTER FOR GYNECOLOGICAL EXAMINATION WITHOUT ABNORMAL FINDING: Primary | ICD-10-CM

## 2020-02-19 DIAGNOSIS — N83.202 CYST OF LEFT OVARY: ICD-10-CM

## 2020-02-19 DIAGNOSIS — Z12.39 SCREENING FOR BREAST CANCER: ICD-10-CM

## 2020-02-19 PROCEDURE — G0101 CA SCREEN;PELVIC/BREAST EXAM: HCPCS | Performed by: OBSTETRICS & GYNECOLOGY

## 2020-02-19 NOTE — PATIENT INSTRUCTIONS
Normal gynecological physical examination  Self-breast examination stressed  Mammogram ordered  History of irregular heartbeat and is followed by her internists and medicated with Toprol  Will get follow-up pelvic ultrasound this year to ensure stability of the chronic left ovarian cyst  Discussed regular exercise, healthy diet, importance of vitamin D and calcium supplements  Discussed importance of sun block use during periods of prolonged sun exposure  Patient will be seen in 1 year for routine gynecologic and medical examination  Patient will call office for any problems, concerns, or issues which may arise during the interim

## 2020-02-19 NOTE — PROGRESS NOTES
Assessment/Plan:    No problem-specific Assessment & Plan notes found for this encounter  Normal gynecological physical examination  Self-breast examination stressed  Mammogram ordered  Will get follow-up pelvic ultrasound this year to ensure stability of the chronic ovarian cyst  History of irregular heartbeat and is followed by her internist and medicated with Toprol  Discussed regular exercise, healthy diet, importance of vitamin D and calcium supplements  Discussed importance of sun block use during periods of prolonged sun exposure  Patient will be seen in 1 year for routine gynecologic and medical examination  Patient will call office for any problems, concerns, or issues which may arise during the interim  Diagnoses and all orders for this visit:    Encounter for gynecological examination without abnormal finding    Screening for breast cancer  -     Mammo screening bilateral w cad; Future        Subjective:      Patient ID: Nisreen Ahmadi is a 79 y o  female  Malcolm Galan is an 79-year-old female presented to the office today for her annual gynecologic and medical exam     The patient states she has a history of a cyst on her ovary that she has received ultrasounds for  She expressed concerns about follow-up on the cyst    Her last ultrasound was in 2016 and was benign  The patient denies fevers, chills, headaches, dizziness, abdominal or pelvic pain, abnormal vaginal discharge or bleeding  She does not check her breasts routinely but reports that she has not noticed any changes  The patient is up-to-date on mammograms and colonoscopies  The patient is followed regularly for her hypertension  The following portions of the patient's history were reviewed and updated as appropriate: allergies, current medications, past family history, past medical history, past social history, past surgical history and problem list     Review of Systems   Constitutional: Negative    Negative for appetite change, diaphoresis, fatigue, fever and unexpected weight change  HENT: Negative  Eyes: Negative  Respiratory: Negative  Cardiovascular: Negative  Gastrointestinal: Negative  Negative for abdominal pain, blood in stool, constipation, diarrhea, nausea and vomiting  Endocrine: Negative  Negative for cold intolerance and heat intolerance  Genitourinary: Negative  Negative for dysuria, frequency, hematuria, urgency, vaginal bleeding, vaginal discharge and vaginal pain  Musculoskeletal: Negative  Skin: Negative  Allergic/Immunologic: Negative  Neurological: Negative  Hematological: Negative  Negative for adenopathy  Psychiatric/Behavioral: Negative  Objective:      /66   Wt 78 kg (172 lb)   BMI 25 70 kg/m²          Physical Exam   Constitutional: She appears well-developed  HENT:   Head: Normocephalic  Eyes: Pupils are equal, round, and reactive to light  Neck: Normal range of motion  Neck supple  Cardiovascular: Normal rate  An irregular rhythm present  Pulmonary/Chest: Effort normal and breath sounds normal  Right breast exhibits no inverted nipple, no mass, no nipple discharge, no skin change and no tenderness  Left breast exhibits no inverted nipple, no mass, no nipple discharge, no skin change and no tenderness  Breasts are symmetrical    Abdominal: Soft  Normal appearance and bowel sounds are normal    Genitourinary: Rectum normal and vagina normal  Pelvic exam was performed with patient supine  There is no rash or lesion on the right labia  There is no rash or lesion on the left labia  Uterus is not enlarged and not tender  Cervix exhibits no discharge and no friability  Right adnexum displays no mass, no tenderness and no fullness  Left adnexum displays no mass, no tenderness and no fullness  No erythema, tenderness or bleeding in the vagina  No vaginal discharge found  Lymphadenopathy:     She has no cervical adenopathy       She has no axillary adenopathy  Right: No inguinal and no supraclavicular adenopathy present  Left: No inguinal and no supraclavicular adenopathy present  Neurological: She is alert  Skin: Skin is intact  No rash noted  Psychiatric: She has a normal mood and affect   Her speech is normal and behavior is normal  Judgment and thought content normal  Cognition and memory are normal

## 2020-02-24 ENCOUNTER — ULTRASOUND (OUTPATIENT)
Dept: OBGYN CLINIC | Facility: CLINIC | Age: 80
End: 2020-02-24
Payer: MEDICARE

## 2020-02-24 DIAGNOSIS — N83.202 CYST OF LEFT OVARY: Primary | ICD-10-CM

## 2020-02-24 PROCEDURE — 76856 US EXAM PELVIC COMPLETE: CPT | Performed by: OBSTETRICS & GYNECOLOGY

## 2020-02-24 NOTE — PROGRESS NOTES
AMB US Pelvic Non OB  Date/Time: 2/24/2020 9:17 AM  Performed by: Ludwig Grey  Authorized by: Martha Lance MD     Procedure details:     Indications: ovarian cysts      Technique:  US Pelvic, Non-OB with complete exam  Uterine findings:     Length (cm): 7    Height (cm):  4 8    Width (cm):  3 1    Uterine adhesions: not identified      Adnexal mass: identified      Location:  Mass left    Polyps: not identified      Myomas: not identified      Endometrial stripe: identified      Endometrial hyperplasia: not identified      Endometrium thickness (mm):  4  Left ovary findings:     Left ovary:  Visualized    Cysts: identified      Length (cm): 4 4    Height (cm): 4 3    Width (cm): 4    Flow:  Absent  Right ovary findings:     Right ovary:  Visualized    Cysts: not identified      Length (cm): 2 4    Height (cm): 2 4    Width (cm): 1 5  Other findings:     Free pelvic fluid: not identified      Free peritoneal fluid: not identified    Post-Procedure Details:     Impression:  LT OV Simple cyst=44 x 43 x 40 mm, with no doppler flow within    Tolerance:   Tolerated well, no immediate complications

## 2020-02-25 NOTE — PROGRESS NOTES
Ovarian cyst is the exact size it was 4 years ago    Good news      Routine follow-up as planned for an annual exam in 1 year    Thanks

## 2020-02-26 ENCOUNTER — TELEPHONE (OUTPATIENT)
Dept: OBGYN CLINIC | Facility: CLINIC | Age: 80
End: 2020-02-26

## 2020-03-04 ENCOUNTER — TELEPHONE (OUTPATIENT)
Dept: OBGYN CLINIC | Facility: CLINIC | Age: 80
End: 2020-03-04

## 2020-05-27 ENCOUNTER — TRANSCRIBE ORDERS (OUTPATIENT)
Dept: ADMINISTRATIVE | Age: 80
End: 2020-05-27

## 2020-05-27 ENCOUNTER — APPOINTMENT (OUTPATIENT)
Dept: LAB | Age: 80
End: 2020-05-27
Payer: MEDICARE

## 2020-05-27 DIAGNOSIS — N39.0 URINARY TRACT INFECTION WITHOUT HEMATURIA, SITE UNSPECIFIED: ICD-10-CM

## 2020-05-27 DIAGNOSIS — N39.0 URINARY TRACT INFECTION WITHOUT HEMATURIA, SITE UNSPECIFIED: Primary | ICD-10-CM

## 2020-05-27 LAB
ALBUMIN SERPL BCP-MCNC: 3.3 G/DL (ref 3.5–5)
ALP SERPL-CCNC: 64 U/L (ref 46–116)
ALT SERPL W P-5'-P-CCNC: 24 U/L (ref 12–78)
ANION GAP SERPL CALCULATED.3IONS-SCNC: 4 MMOL/L (ref 4–13)
AST SERPL W P-5'-P-CCNC: 15 U/L (ref 5–45)
BASOPHILS # BLD AUTO: 0.07 THOUSANDS/ΜL (ref 0–0.1)
BASOPHILS NFR BLD AUTO: 1 % (ref 0–1)
BILIRUB SERPL-MCNC: 0.59 MG/DL (ref 0.2–1)
BUN SERPL-MCNC: 19 MG/DL (ref 5–25)
CALCIUM SERPL-MCNC: 9 MG/DL (ref 8.3–10.1)
CHLORIDE SERPL-SCNC: 106 MMOL/L (ref 100–108)
CO2 SERPL-SCNC: 26 MMOL/L (ref 21–32)
CREAT SERPL-MCNC: 0.8 MG/DL (ref 0.6–1.3)
EOSINOPHIL # BLD AUTO: 0.25 THOUSAND/ΜL (ref 0–0.61)
EOSINOPHIL NFR BLD AUTO: 5 % (ref 0–6)
ERYTHROCYTE [DISTWIDTH] IN BLOOD BY AUTOMATED COUNT: 11.9 % (ref 11.6–15.1)
EST. AVERAGE GLUCOSE BLD GHB EST-MCNC: 123 MG/DL
GFR SERPL CREATININE-BSD FRML MDRD: 69 ML/MIN/1.73SQ M
GLUCOSE P FAST SERPL-MCNC: 96 MG/DL (ref 65–99)
HBA1C MFR BLD: 5.9 %
HCT VFR BLD AUTO: 38.9 % (ref 34.8–46.1)
HGB BLD-MCNC: 12.6 G/DL (ref 11.5–15.4)
IMM GRANULOCYTES # BLD AUTO: 0.01 THOUSAND/UL (ref 0–0.2)
IMM GRANULOCYTES NFR BLD AUTO: 0 % (ref 0–2)
LYMPHOCYTES # BLD AUTO: 1.29 THOUSANDS/ΜL (ref 0.6–4.47)
LYMPHOCYTES NFR BLD AUTO: 25 % (ref 14–44)
MCH RBC QN AUTO: 30.1 PG (ref 26.8–34.3)
MCHC RBC AUTO-ENTMCNC: 32.4 G/DL (ref 31.4–37.4)
MCV RBC AUTO: 93 FL (ref 82–98)
MONOCYTES # BLD AUTO: 0.62 THOUSAND/ΜL (ref 0.17–1.22)
MONOCYTES NFR BLD AUTO: 12 % (ref 4–12)
NEUTROPHILS # BLD AUTO: 2.83 THOUSANDS/ΜL (ref 1.85–7.62)
NEUTS SEG NFR BLD AUTO: 57 % (ref 43–75)
NRBC BLD AUTO-RTO: 0 /100 WBCS
PLATELET # BLD AUTO: 265 THOUSANDS/UL (ref 149–390)
PMV BLD AUTO: 10.3 FL (ref 8.9–12.7)
POTASSIUM SERPL-SCNC: 3.7 MMOL/L (ref 3.5–5.3)
PROT SERPL-MCNC: 6.6 G/DL (ref 6.4–8.2)
RBC # BLD AUTO: 4.18 MILLION/UL (ref 3.81–5.12)
SODIUM SERPL-SCNC: 136 MMOL/L (ref 136–145)
WBC # BLD AUTO: 5.07 THOUSAND/UL (ref 4.31–10.16)

## 2020-05-27 PROCEDURE — 83036 HEMOGLOBIN GLYCOSYLATED A1C: CPT | Performed by: FAMILY MEDICINE

## 2020-05-27 PROCEDURE — 85025 COMPLETE CBC W/AUTO DIFF WBC: CPT | Performed by: FAMILY MEDICINE

## 2020-05-27 PROCEDURE — 80053 COMPREHEN METABOLIC PANEL: CPT | Performed by: FAMILY MEDICINE

## 2020-05-27 PROCEDURE — 36415 COLL VENOUS BLD VENIPUNCTURE: CPT | Performed by: FAMILY MEDICINE

## 2020-07-07 ENCOUNTER — OFFICE VISIT (OUTPATIENT)
Dept: FAMILY MEDICINE CLINIC | Facility: CLINIC | Age: 80
End: 2020-07-07
Payer: MEDICARE

## 2020-07-07 VITALS
SYSTOLIC BLOOD PRESSURE: 124 MMHG | RESPIRATION RATE: 16 BRPM | HEART RATE: 56 BPM | TEMPERATURE: 98.6 F | DIASTOLIC BLOOD PRESSURE: 62 MMHG | WEIGHT: 172 LBS | HEIGHT: 69 IN | BODY MASS INDEX: 25.48 KG/M2

## 2020-07-07 DIAGNOSIS — I10 ESSENTIAL HYPERTENSION: Primary | ICD-10-CM

## 2020-07-07 DIAGNOSIS — Z00.00 MEDICARE ANNUAL WELLNESS VISIT, SUBSEQUENT: ICD-10-CM

## 2020-07-07 DIAGNOSIS — R73.01 IFG (IMPAIRED FASTING GLUCOSE): ICD-10-CM

## 2020-07-07 DIAGNOSIS — D70.9 NEUTROPENIA, UNSPECIFIED TYPE (HCC): ICD-10-CM

## 2020-07-07 DIAGNOSIS — M17.11 PRIMARY OSTEOARTHRITIS OF RIGHT KNEE: ICD-10-CM

## 2020-07-07 DIAGNOSIS — E78.00 PURE HYPERCHOLESTEROLEMIA: ICD-10-CM

## 2020-07-07 PROBLEM — R07.9 CHEST PAIN: Status: RESOLVED | Noted: 2020-01-24 | Resolved: 2020-07-07

## 2020-07-07 PROCEDURE — 3074F SYST BP LT 130 MM HG: CPT | Performed by: FAMILY MEDICINE

## 2020-07-07 PROCEDURE — 1160F RVW MEDS BY RX/DR IN RCRD: CPT | Performed by: FAMILY MEDICINE

## 2020-07-07 PROCEDURE — 99214 OFFICE O/P EST MOD 30 MIN: CPT | Performed by: FAMILY MEDICINE

## 2020-07-07 PROCEDURE — 1170F FXNL STATUS ASSESSED: CPT | Performed by: FAMILY MEDICINE

## 2020-07-07 PROCEDURE — 1123F ACP DISCUSS/DSCN MKR DOCD: CPT | Performed by: FAMILY MEDICINE

## 2020-07-07 PROCEDURE — G0439 PPPS, SUBSEQ VISIT: HCPCS | Performed by: FAMILY MEDICINE

## 2020-07-07 PROCEDURE — 3078F DIAST BP <80 MM HG: CPT | Performed by: FAMILY MEDICINE

## 2020-07-07 PROCEDURE — 4040F PNEUMOC VAC/ADMIN/RCVD: CPT | Performed by: FAMILY MEDICINE

## 2020-07-07 PROCEDURE — 1125F AMNT PAIN NOTED PAIN PRSNT: CPT | Performed by: FAMILY MEDICINE

## 2020-07-07 PROCEDURE — 3008F BODY MASS INDEX DOCD: CPT | Performed by: FAMILY MEDICINE

## 2020-07-07 PROCEDURE — 1036F TOBACCO NON-USER: CPT | Performed by: FAMILY MEDICINE

## 2020-07-07 NOTE — PROGRESS NOTES
Assessment and Plan:     Problem List Items Addressed This Visit        Cardiovascular and Mediastinum    Hypertension - Primary    Relevant Orders    CBC and differential    Comprehensive metabolic panel       Musculoskeletal and Integument    Osteoarthritis of knee       Other    Hyperlipidemia    Relevant Orders    Lipid panel    Neutropenia ([address] )      Other Visit Diagnoses     IFG (impaired fasting glucose)        Relevant Orders    Hemoglobin A1C    Medicare annual wellness visit, subsequent            BMI Counseling: Body mass index is 25 7 kg/m²  The BMI is above normal  Nutrition recommendations include decreasing portion sizes, consuming healthier snacks, moderation in carbohydrate intake, reducing intake of saturated and trans fat and reducing intake of cholesterol  Exercise recommendations include exercising 3-5 times per week  No pharmacotherapy was ordered  Preventive health issues were discussed with patient, and age appropriate screening tests were ordered as noted in patient's After Visit Summary  Personalized health advice and appropriate referrals for health education or preventive services given if needed, as noted in patient's After Visit Summary       History of Present Illness:     Patient presents for Medicare Annual Wellness visit    Patient Care Team:  Torri Anders MD as PCP - MD Madelyn Motta MD (Colon and Rectal Surgery)     Problem List:     Patient Active Problem List   Diagnosis    Cyst of left ovary    Diverticulosis    Hyperlipidemia    Hypertension    Osteoarthritis of knee    Osteopenia    Post-menopausal bleeding    Gastroesophageal reflux disease without esophagitis    Family history of colon cancer    Neutropenia [facility])      Past Medical and Surgical History:     Past Medical History:   Diagnosis Date    Accelerated essential hypertension     last assessed 08/17/2015    Depression with anxiety     last assesed 08/16/2012    Strep pharyngitis 8/18/2019     Past Surgical History:   Procedure Laterality Date    BLEPHAROPLASTY      upper lid w/excessive skin    CARDIOVASCULAR STRESS TEST      onset June 2005    COLONOSCOPY      DILATION AND CURETTAGE, DIAGNOSTIC / THERAPEUTIC      ENDOMETRIAL BIOPSY      negative for dysplasia, hyperplasia and malignancy, resolved 03/25/2013    TUBAL LIGATION        Family History:     Family History   Problem Relation Age of Onset    Heart disease Father         cardiac disorder    Skin cancer Father     Colon cancer Sister 80    Heart disease Family     Hypertension Family     Coronary artery disease Family     No Known Problems Daughter     No Known Problems Daughter     No Known Problems Daughter     No Known Problems Mother       Social History:        Social History     Socioeconomic History    Marital status:       Spouse name: None    Number of children: None    Years of education: None    Highest education level: None   Occupational History    None   Social Needs    Financial resource strain: None    Food insecurity:     Worry: None     Inability: None    Transportation needs:     Medical: None     Non-medical: None   Tobacco Use    Smoking status: Never Smoker    Smokeless tobacco: Never Used   Substance and Sexual Activity    Alcohol use: Yes     Comment: rare    Drug use: No    Sexual activity: None   Lifestyle    Physical activity:     Days per week: None     Minutes per session: None    Stress: None   Relationships    Social connections:     Talks on phone: None     Gets together: None     Attends [employer] service: None     Active member of club or organization: None     Attends meetings of clubs or organizations: None     Relationship status: None    Intimate partner violence:     Fear of current or ex partner: None     Emotionally abused: None     Physically abused: None     Forced sexual activity: None   Other Topics Concern    None   Social History Narrative    Denied history of home environment domestic violence      Medications and Allergies:     Current Outpatient Medications   Medication Sig Dispense Refill    amLODIPine (NORVASC) 2 5 mg tablet Take 1 tablet (2 5 mg total) by mouth 2 (two) times a day 180 tablet 3    Calcium Carb-Cholecalciferol (CALCIUM 1000 + D) 1000-800 MG-UNIT TABS Take by mouth      Cholecalciferol 2000 units CAPS Take 1 capsule by mouth      irbesartan (AVAPRO) 300 mg tablet Take 1 tablet (300 mg total) by mouth daily at bedtime 90 tablet 3    methenamine hippurate (HIPREX) 1 g tablet Take 1 g by mouth      metoprolol tartrate (LOPRESSOR) 50 mg tablet Take 1 tablet (50 mg total) by mouth 2 (two) times a day 180 tablet 3    multivitamin (THERAGRAN) TABS Take 1 tablet by mouth       No current facility-administered medications for this visit  Allergies   Allergen Reactions    Penicillins Rash      Immunizations:     Immunization History   Administered Date(s) Administered    INFLUENZA 09/28/2019    Influenza Split High Dose Preservative Free IM 09/18/2013, 09/23/2014, 11/17/2015, 11/30/2016, 12/12/2017    Influenza TIV (IM) 1939, 09/10/2012    Influenza, high dose seasonal 0 5 mL 11/05/2018, 09/28/2019    Pneumococcal Conjugate 13-Valent 11/17/2015    Pneumococcal Polysaccharide PPV23 11/02/2006    Td (adult), adsorbed 12/29/2010      Health Maintenance:         Topic Date Due    CRC Screening: Colonoscopy  03/07/2024         Topic Date Due    Influenza Vaccine  07/01/2020      Medicare Health Risk Assessment:     /62   Pulse 56   Temp 98 6 °F (37 °C)   Resp 16   Ht 5' 8 6" (1 742 m)   Wt 78 kg (172 lb)   BMI 25 70 kg/m²      Lisandra Chaparro is here for her Subsequent Wellness visit  Last Medicare Wellness visit information reviewed, patient interviewed and updates made to the record today  Health Risk Assessment:   Patient rates overall health as very good   Patient feels that their physical health rating is same  Eyesight was rated as same  Hearing was rated as same  Patient feels that their emotional and mental health rating is same  Pain experienced in the last 7 days has been some  Patient's pain rating has been 2/10  Patient states that she has experienced no weight loss or gain in last 6 months  Depression Screening:   PHQ-2 Score: 0      Fall Risk Screening: In the past year, patient has experienced: no history of falling in past year      Urinary Incontinence Screening:   Patient has leaked urine accidently in the last six months  Home Safety:  Patient does not have trouble with stairs inside or outside of their home  Patient has working smoke alarms and has working carbon monoxide detector  Home safety hazards include: none  Nutrition:   Current diet is Regular and No Added Salt  Medications:   Patient is currently taking over-the-counter supplements  OTC medications include: see medication list  Patient is able to manage medications  Activities of Daily Living (ADLs)/Instrumental Activities of Daily Living (IADLs):   Walk and transfer into and out of bed and chair?: Yes  Dress and groom yourself?: Yes    Bathe or shower yourself?: Yes    Feed yourself?  Yes  Do your laundry/housekeeping?: Yes  Manage your money, pay your bills and track your expenses?: Yes  Make your own meals?: Yes    Do your own shopping?: Yes    Previous Hospitalizations:   Any hospitalizations or ED visits within the last 12 months?: Yes    How many hospitalizations have you had in the last year?: 1-2    Advance Care Planning:   Living will: Yes    Advanced directive: Yes      Cognitive Screening:   Provider or family/friend/caregiver concerned regarding cognition?: No    PREVENTIVE SCREENINGS      Cardiovascular Screening:    General: Screening Not Indicated and History Lipid Disorder      Diabetes Screening:     General: Screening Current      Colorectal Cancer Screening:     General: Screening Current Breast Cancer Screening:       Due for: Mammogram        Cervical Cancer Screening:    General: Screening Not Indicated      Osteoporosis Screening:    General: Screening Current      Abdominal Aortic Aneurysm (AAA) Screening:        General: Screening Not Indicated      Lung Cancer Screening:     General: Screening Not Indicated      Hepatitis C Screening:    General: Screening Not Indicated    Other Counseling Topics:   Calcium and vitamin D intake and regular weightbearing exercise         Tala Nicole MD

## 2020-07-07 NOTE — PROGRESS NOTES
Assessment/Plan:     Diagnoses and all orders for this visit:    Essential hypertension  -     CBC and differential  -     Comprehensive metabolic panel    Pure hypercholesterolemia  -     Lipid panel    IFG (impaired fasting glucose)  -     Hemoglobin A1C    Primary osteoarthritis of right knee    Neutropenia, unspecified type ([address] )    Medicare annual wellness visit, subsequent          Continue with current medications  Office visit in 6 months with repeat labs at that time  Flu vaccine in the fall  BMI Counseling: Body mass index is 25 7 kg/m²  The BMI is above normal  Nutrition recommendations include reducing portion sizes, decreasing overall calorie intake, consuming healthier snacks, moderation in carbohydrate intake, reducing intake of saturated fat and trans fat and reducing intake of cholesterol  Exercise recommendations include exercising 3-5 times per week  Patient ID: Vicki Laboy is a 79 y o  female  Followup visit  Medications reviewed  Hypertension BPs have been stable on current 3 drug regimen  on Amlodipine 2 5 mg daily, Valsartan 320 mg daily and Metoprolol 50 mg BID  Past history of hyponatremia and hypokalemia while on HCTZ  05/2020  creatinine 0 80  Electrolytes normal  Na+ 136  K+ 3 7  Hgb 12 6  Last lipid profile 05/2019 cholesterol 193  TGs 95  HDL 55    TSH 3 550  IFG 05/2020 FBS 96  A1c 5 9 + FH type DM  Physically active exercises regularly         Recent Results (from the past 1680 hour(s))   CBC and differential    Collection Time: 05/27/20  8:16 AM   Result Value Ref Range    WBC 5 07 4 31 - 10 16 Thousand/uL    RBC 4 18 3 81 - 5 12 Million/uL    Hemoglobin 12 6 11 5 - 15 4 g/dL    Hematocrit 38 9 34 8 - 46 1 %    MCV 93 82 - 98 fL    MCH 30 1 26 8 - 34 3 pg    MCHC 32 4 31 4 - 37 4 g/dL    RDW 11 9 11 6 - 15 1 %    MPV 10 3 8 9 - 12 7 fL    Platelets 057 071 - 203 Thousands/uL    nRBC 0 /100 WBCs    Neutrophils Relative 57 43 - 75 %    Immat GRANS % 0 0 - 2 % Lymphocytes Relative 25 14 - 44 %    Monocytes Relative 12 4 - 12 %    Eosinophils Relative 5 0 - 6 %    Basophils Relative 1 0 - 1 %    Neutrophils Absolute 2 83 1 85 - 7 62 Thousands/µL    Immature Grans Absolute 0 01 0 00 - 0 20 Thousand/uL    Lymphocytes Absolute 1 29 0 60 - 4 47 Thousands/µL    Monocytes Absolute 0 62 0 17 - 1 22 Thousand/µL    Eosinophils Absolute 0 25 0 00 - 0 61 Thousand/µL    Basophils Absolute 0 07 0 00 - 0 10 Thousands/µL   Comprehensive metabolic panel    Collection Time: 05/27/20  8:16 AM   Result Value Ref Range    Sodium 136 136 - 145 mmol/L    Potassium 3 7 3 5 - 5 3 mmol/L    Chloride 106 100 - 108 mmol/L    CO2 26 21 - 32 mmol/L    ANION GAP 4 4 - 13 mmol/L    BUN 19 5 - 25 mg/dL    Creatinine 0 80 0 60 - 1 30 mg/dL    Glucose, Fasting 96 65 - 99 mg/dL    Calcium 9 0 8 3 - 10 1 mg/dL    AST 15 5 - 45 U/L    ALT 24 12 - 78 U/L    Alkaline Phosphatase 64 46 - 116 U/L    Total Protein 6 6 6 4 - 8 2 g/dL    Albumin 3 3 (L) 3 5 - 5 0 g/dL    Total Bilirubin 0 59 0 20 - 1 00 mg/dL    eGFR 69 ml/min/1 73sq m   Hemoglobin A1C    Collection Time: 05/27/20  8:16 AM   Result Value Ref Range    Hemoglobin A1C 5 9 (H) Normal 3 8-5 6%; PreDiabetic 5 7-6 4%;  Diabetic >=6 5%; Glycemic control for adults with diabetes <7 0% %     mg/dl     Lab Results   Component Value Date    CHOLESTEROL 193 05/21/2019    CHOLESTEROL 186 05/24/2018    CHOLESTEROL 184 06/15/2017     Lab Results   Component Value Date    HDL 52 05/21/2019    HDL 55 05/24/2018    HDL 51 06/15/2017     Lab Results   Component Value Date    TRIG 95 05/21/2019    TRIG 81 05/24/2018    TRIG 111 06/15/2017     Lab Results   Component Value Date    [place] 141 05/21/2019    [place] 131 05/24/2018       Lab Results   Component Value Date    [id number] 3 550 05/21/2019           The following portions of the patient's history were reviewed and updated as appropriate: allergies, current medications, past family history, past medical history, past social history, past surgical history and problem list     Review of Systems   Constitutional: Negative for appetite change, chills, fatigue, fever and unexpected weight change  HENT: Negative for congestion, ear pain, hearing loss, rhinorrhea, sore throat and trouble swallowing  Chronic nasal congestion improved with Flonase  Eyes: Negative for visual disturbance  Respiratory: Negative for cough, shortness of breath and wheezing  Cardiovascular: Negative for chest pain, palpitations and leg swelling  01/2020 admission for substernal chest pain  Patient was initialy seen at urgent care  EKG NSR with frequent PVCs  [facility]  Initial EKG NSR with fusion complexes  No acute changes  2nd EKG sinus bradycardia with PACs  troponins x 3 negative  Chest x-ray no active disease  Stress echocardiogram normal   EF 60%  Gastrointestinal: Negative for abdominal pain, blood in stool, constipation, diarrhea, nausea and vomiting  Chronic constipation  Colonoscopy 03/2019 normal except for diverticulosis  + FH colon CA sister  Endocrine: Negative for polydipsia and polyuria  Past history of borderline abnormal TSH 05/2019 TSH 3 550   06/2017  TSH 3 840  repeat TSH 07/2017 2 630  thyroid ABs negative  05/2018 TSH 2 830   02/2015 DEXA scan normal    Genitourinary: Negative for difficulty urinating, dysuria and hematuria  History of recurrent UTIs followed by urology  On Hiprex  02/2019 kidney bladder ultrasound normal minimal PVR  29 ML  Simple cyst left adnexa without significant change  pelvic u/s 08/2016 stable simple left ovarian cyst  01/2017  normal at 9 5 followed by GYN ,    Musculoskeletal: Positive for arthralgias  Negative for myalgias  OA right knee s/p steroid injection by ortho with symptom relief  X rays right knee 06/2020  Reviewed  Prior MRI right knee showed  meniscal tear  Skin: Negative for rash  Allergic/Immunologic: Positive for environmental allergies  Neurological: Negative for dizziness and headaches  Hematological: Negative for adenopathy  Does not bruise/bleed easily  Past history of mild leukopenia   05/2019 CBC WBC 4880  Hemoglobin 12 9  MCV 95  Platelet count 048,628   06/2018 CBC WBC 5790  Hemoglobin 13  MCV 95  Platelet count [id number]  Vitamin B12 589  Folate greater than 20   05/2018 CBC WBC 3,900  Hgb 13  Platelets 871,986   34/0343 CBC WBC 5270  Hemoglobin 13 1  Platelet count [id number]   06/2017 CBC WBC 4,870  Hemoglobin 12 9  Platelet count 725,909    Lab Results       Component                Value               Date                       WBC                      5 07                05/27/2020                 HGB                      12 6                05/27/2020                 HCT                      38 9                05/27/2020                 MCV                      93                  05/27/2020                 PLT                      265                 05/27/2020               Psychiatric/Behavioral: Negative for dysphoric mood and sleep disturbance  Objective:      /62   Pulse 56   Temp 98 6 °F (37 °C)   Resp 16   Ht 5' 8 6" (1 742 m)   Wt 78 kg (172 lb)   BMI 25 70 kg/m²       BP Readings from Last 3 Encounters:   07/07/20 124/62   02/19/20 120/66   02/04/20 132/70     Wt Readings from Last 3 Encounters:   07/07/20 78 kg (172 lb)   02/19/20 78 kg (172 lb)   02/04/20 78 9 kg (174 lb)          Physical Exam   Constitutional: She is oriented to person, place, and time  She appears well-developed and well-nourished  No distress  HENT:   Mouth/Throat: Oropharynx is clear and moist and mucous membranes are normal  No oral lesions  Normal dentition  Eyes: Pupils are equal, round, and reactive to light  Conjunctivae and EOM are normal  No scleral icterus  Neck: No JVD present  Carotid bruit is not present  No tracheal deviation present   No thyroid mass and no thyromegaly present  Cardiovascular: Normal rate, regular rhythm and normal heart sounds  Exam reveals no gallop  No murmur heard  Pulmonary/Chest: Effort normal and breath sounds normal  No respiratory distress  She has no wheezes  She has no rales  Abdominal: Soft  Bowel sounds are normal  She exhibits no distension, no abdominal bruit and no mass  There is no hepatosplenomegaly  There is no tenderness  There is no rebound and no guarding  Musculoskeletal: Normal range of motion  She exhibits no edema or deformity  Lymphadenopathy:     She has no cervical adenopathy  Neurological: She is alert and oriented to person, place, and time  She displays normal reflexes  No cranial nerve deficit  Skin: No rash noted  No cyanosis  Nails show no clubbing  Psychiatric: She has a normal mood and affect  Nursing note and vitals reviewed

## 2020-07-07 NOTE — PATIENT INSTRUCTIONS
Medicare Preventive Visit Patient Instructions  Thank you for completing your Welcome to Medicare Visit or Medicare Annual Wellness Visit today  Your next wellness visit will be due in one year (7/7/2021)  The screening/preventive services that you may require over the next 5-10 years are detailed below  Some tests may not apply to you based off risk factors and/or age  Screening tests ordered at today's visit but not completed yet may show as past due  Also, please note that scanned in results may not display below  Preventive Screenings:  Service Recommendations Previous Testing/Comments   Colorectal Cancer Screening  * Colonoscopy    * Fecal Occult Blood Test (FOBT)/Fecal Immunochemical Test (FIT)  * Fecal DNA/Cologuard Test  * Flexible Sigmoidoscopy Age: 54-65 years old   Colonoscopy: every 10 years (may be performed more frequently if at higher risk)  OR  FOBT/FIT: every 1 year  OR  Cologuard: every 3 years  OR  Sigmoidoscopy: every 5 years  Screening may be recommended earlier than age 48 if at higher risk for colorectal cancer  Also, an individualized decision between you and your healthcare provider will decide whether screening between the ages of 74-80 would be appropriate  Colonoscopy: 03/07/2019  FOBT/FIT: Not on file  Cologuard: Not on file  Sigmoidoscopy: Not on file    Screening Current     Breast Cancer Screening Age: 36 years old  Frequency: every 1-2 years  Not required if history of left and right mastectomy Mammogram: 05/02/2019    Screening Current   Cervical Cancer Screening Between the ages of 21-29, pap smear recommended once every 3 years  Between the ages of 33-67, can perform pap smear with HPV co-testing every 5 years     Recommendations may differ for women with a history of total hysterectomy, cervical cancer, or abnormal pap smears in past  Pap Smear: 02/19/2020    Screening Not Indicated   Hepatitis C Screening Once for adults born between 1945 and 1965  More frequently in patients at high risk for Hepatitis C Hep C Antibody: Not on file       Diabetes Screening 1-2 times per year if you're at risk for diabetes or have pre-diabetes Fasting glucose: 96 mg/dL   A1C: 5 9 %    Screening Current   Cholesterol Screening Once every 5 years if you don't have a lipid disorder  May order more often based on risk factors  Lipid panel: 01/25/2020    Screening Not Indicated  History Lipid Disorder     Other Preventive Screenings Covered by Medicare:  1  Abdominal Aortic Aneurysm (AAA) Screening: covered once if your at risk  You're considered to be at risk if you have a family history of AAA  2  Lung Cancer Screening: covers low dose CT scan once per year if you meet all of the following conditions: (1) Age 50-69; (2) No signs or symptoms of lung cancer; (3) Current smoker or have quit smoking within the last 15 years; (4) You have a tobacco smoking history of at least 30 pack years (packs per day multiplied by number of years you smoked); (5) You get a written order from a healthcare provider  3  Glaucoma Screening: covered annually if you're considered high risk: (1) You have diabetes OR (2) Family history of glaucoma OR (3)  aged 48 and older OR (3)  American aged 72 and older  3  Osteoporosis Screening: covered every 2 years if you meet one of the following conditions: (1) You're estrogen deficient and at risk for osteoporosis based off medical history and other findings; (2) Have a vertebral abnormality; (3) On glucocorticoid therapy for more than 3 months; (4) Have primary hyperparathyroidism; (5) On osteoporosis medications and need to assess response to drug therapy  · Last bone density test (DXA Scan): 02/05/2015  5  HIV Screening: covered annually if you're between the age of 12-76  Also covered annually if you are younger than 13 and older than 72 with risk factors for HIV infection   For pregnant patients, it is covered up to 3 times per pregnancy  Immunizations:  Immunization Recommendations   Influenza Vaccine Annual influenza vaccination during flu season is recommended for all persons aged >= 6 months who do not have contraindications   Pneumococcal Vaccine (Prevnar and Pneumovax)  * Prevnar = PCV13  * Pneumovax = PPSV23   Adults 25-60 years old: 1-3 doses may be recommended based on certain risk factors  Adults 72 years old: Prevnar (PCV13) vaccine recommended followed by Pneumovax (PPSV23) vaccine  If already received PPSV23 since turning 65, then PCV13 recommended at least one year after PPSV23 dose  Hepatitis B Vaccine 3 dose series if at intermediate or high risk (ex: diabetes, end stage renal disease, liver disease)   Tetanus (Td) Vaccine - COST NOT COVERED BY MEDICARE PART B Following completion of primary series, a booster dose should be given every 10 years to maintain immunity against tetanus  Td may also be given as tetanus wound prophylaxis  Tdap Vaccine - COST NOT COVERED BY MEDICARE PART B Recommended at least once for all adults  For pregnant patients, recommended with each pregnancy  Shingles Vaccine (Shingrix) - COST NOT COVERED BY MEDICARE PART B  2 shot series recommended in those aged 48 and above     Health Maintenance Due:      Topic Date Due    CRC Screening: Colonoscopy  03/07/2024     Immunizations Due:      Topic Date Due    Influenza Vaccine  07/01/2020     Advance Directives   What are advance directives? Advance directives are legal documents that state your wishes and plans for medical care  These plans are made ahead of time in case you lose your ability to make decisions for yourself  Advance directives can apply to any medical decision, such as the treatments you want, and if you want to donate organs  What are the types of advance directives? There are many types of advance directives, and each state has rules about how to use them   You may choose a combination of any of the following:  · Living will:  This is a written record of the treatment you want  You can also choose which treatments you do not want, which to limit, and which to stop at a certain time  This includes surgery, medicine, IV fluid, and tube feedings  · Durable power of  for healthcare [facility]): This is a written record that states who you want to make healthcare choices for you when you are unable to make them for yourself  This person, called a proxy, is usually a family member or a friend  You may choose more than 1 proxy  · Do not resuscitate (DNR) order:  A DNR order is used in case your heart stops beating or you stop breathing  It is a request not to have certain forms of treatment, such as CPR  A DNR order may be included in other types of advance directives  · Medical directive: This covers the care that you want if you are in a coma, near death, or unable to make decisions for yourself  You can list the treatments you want for each condition  Treatment may include pain medicine, surgery, blood transfusions, dialysis, IV or tube feedings, and a ventilator (breathing machine)  · Values history: This document has questions about your views, beliefs, and how you feel and think about life  This information can help others choose the care that you would choose  Why are advance directives important? An advance directive helps you control your care  Although spoken wishes may be used, it is better to have your wishes written down  Spoken wishes can be misunderstood, or not followed  Treatments may be given even if you do not want them  An advance directive may make it easier for your family to make difficult choices about your care  Urinary Incontinence   Urinary incontinence (UI)  is when you lose control of your bladder  UI develops because your bladder cannot store or empty urine properly  The 3 most common types of UI are stress incontinence, urge incontinence, or both    Medicines:   · May be given to help strengthen your bladder control  Report any side effects of medication to your healthcare provider  Do pelvic muscle exercises often:  Your pelvic muscles help you stop urinating  Squeeze these muscles tight for 5 seconds, then relax for 5 seconds  Gradually work up to squeezing for 10 seconds  Do 3 sets of 15 repetitions a day, or as directed  This will help strengthen your pelvic muscles and improve bladder control  Train your bladder:  Go to the bathroom at set times, such as every 2 hours, even if you do not feel the urge to go  You can also try to hold your urine when you feel the urge to go  For example, hold your urine for 5 minutes when you feel the urge to go  As that becomes easier, hold your urine for 10 minutes  Self-care:   · Keep a UI record  Write down how often you leak urine and how much you leak  Make a note of what you were doing when you leaked urine  · Drink liquids as directed  You may need to limit the amount of liquid you drink to help control your urine leakage  Do not drink any liquid right before you go to bed  Limit or do not have drinks that contain caffeine or alcohol  · Prevent constipation  Eat a variety of high-fiber foods  Good examples are high-fiber cereals, beans, vegetables, and whole-grain breads  Walking is the best way to trigger your intestines to have a bowel movement  · Exercise regularly and maintain a healthy weight  Weight loss and exercise will decrease pressure on your bladder and help you control your leakage  · Use a catheter as directed  to help empty your bladder  A catheter is a tiny, plastic tube that is put into your bladder to drain your urine  · Go to behavior therapy as directed  Behavior therapy may be used to help you learn to control your urge to urinate      Weight Management   Why it is important to manage your weight:  Being overweight increases your risk of health conditions such as heart disease, high blood pressure, type 2 diabetes, and certain types of cancer  It can also increase your risk for osteoarthritis, sleep apnea, and other respiratory problems  Aim for a slow, steady weight loss  Even a small amount of weight loss can lower your risk of health problems  How to lose weight safely:  A safe and healthy way to lose weight is to eat fewer calories and get regular exercise  You can lose up about 1 pound a week by decreasing the number of calories you eat by 500 calories each day  Healthy meal plan for weight management:  A healthy meal plan includes a variety of foods, contains fewer calories, and helps you stay healthy  A healthy meal plan includes the following:  · Eat whole-grain foods more often  A healthy meal plan should contain fiber  Fiber is the part of grains, fruits, and vegetables that is not broken down by your body  Whole-grain foods are healthy and provide extra fiber in your diet  Some examples of whole-grain foods are whole-wheat breads and pastas, oatmeal, brown rice, and bulgur  · Eat a variety of vegetables every day  Include dark, leafy greens such as spinach, kale, anastasia greens, and mustard greens  Eat yellow and orange vegetables such as carrots, sweet potatoes, and winter squash  · Eat a variety of fruits every day  Choose fresh or canned fruit (canned in its own juice or light syrup) instead of juice  Fruit juice has very little or no fiber  · Eat low-fat dairy foods  Drink fat-free (skim) milk or 1% milk  Eat fat-free yogurt and low-fat cottage cheese  Try low-fat cheeses such as mozzarella and other reduced-fat cheeses  · Choose meat and other protein foods that are low in fat  Choose beans or other legumes such as split peas or lentils  Choose fish, skinless poultry (chicken or turkey), or lean cuts of red meat (beef or pork)  Before you cook meat or poultry, cut off any visible fat  · Use less fat and oil  Try baking foods instead of frying them   Add less fat, such as margarine, sour cream, regular salad dressing and mayonnaise to foods  Eat fewer high-fat foods  Some examples of high-fat foods include french fries, doughnuts, ice cream, and cakes  · Eat fewer sweets  Limit foods and drinks that are high in sugar  This includes candy, cookies, regular soda, and sweetened drinks  Exercise:  Exercise at least 30 minutes per day on most days of the week  Some examples of exercise include walking, biking, dancing, and swimming  You can also fit in more physical activity by taking the stairs instead of the elevator or parking farther away from stores  Ask your healthcare provider about the best exercise plan for you  © Copyright [employer] 2018 Information is for End User's use only and may not be sold, redistributed or otherwise used for commercial purposes   All illustrations and images included in CareNotes® are the copyrighted property of A [employer]  or [address] [place] [address]

## 2020-10-15 ENCOUNTER — OFFICE VISIT (OUTPATIENT)
Dept: FAMILY MEDICINE CLINIC | Facility: CLINIC | Age: 80
End: 2020-10-15
Payer: MEDICARE

## 2020-10-15 VITALS
TEMPERATURE: 97.4 F | OXYGEN SATURATION: 98 % | SYSTOLIC BLOOD PRESSURE: 144 MMHG | DIASTOLIC BLOOD PRESSURE: 64 MMHG | BODY MASS INDEX: 27.7 KG/M2 | RESPIRATION RATE: 17 BRPM | HEIGHT: 67 IN | WEIGHT: 176.5 LBS | HEART RATE: 68 BPM

## 2020-10-15 DIAGNOSIS — K64.9 BLEEDING HEMORRHOIDS: Primary | ICD-10-CM

## 2020-10-15 PROCEDURE — 99213 OFFICE O/P EST LOW 20 MIN: CPT | Performed by: FAMILY MEDICINE

## 2020-11-02 ENCOUNTER — TRANSCRIBE ORDERS (OUTPATIENT)
Dept: ADMINISTRATIVE | Facility: HOSPITAL | Age: 80
End: 2020-11-02

## 2020-11-02 DIAGNOSIS — Z12.31 ENCOUNTER FOR SCREENING MAMMOGRAM FOR MALIGNANT NEOPLASM OF BREAST: Primary | ICD-10-CM

## 2020-12-14 ENCOUNTER — LAB (OUTPATIENT)
Dept: LAB | Age: 80
End: 2020-12-14
Payer: MEDICARE

## 2020-12-14 LAB
ALBUMIN SERPL BCP-MCNC: 3.4 G/DL (ref 3.5–5)
ALP SERPL-CCNC: 65 U/L (ref 46–116)
ALT SERPL W P-5'-P-CCNC: 23 U/L (ref 12–78)
ANION GAP SERPL CALCULATED.3IONS-SCNC: 7 MMOL/L (ref 4–13)
AST SERPL W P-5'-P-CCNC: 15 U/L (ref 5–45)
BASOPHILS # BLD AUTO: 0.04 THOUSANDS/ΜL (ref 0–0.1)
BASOPHILS NFR BLD AUTO: 1 % (ref 0–1)
BILIRUB SERPL-MCNC: 0.63 MG/DL (ref 0.2–1)
BUN SERPL-MCNC: 16 MG/DL (ref 5–25)
CALCIUM ALBUM COR SERPL-MCNC: 9.2 MG/DL (ref 8.3–10.1)
CALCIUM SERPL-MCNC: 8.7 MG/DL (ref 8.3–10.1)
CHLORIDE SERPL-SCNC: 106 MMOL/L (ref 100–108)
CHOLEST SERPL-MCNC: 187 MG/DL (ref 50–200)
CO2 SERPL-SCNC: 26 MMOL/L (ref 21–32)
CREAT SERPL-MCNC: 0.75 MG/DL (ref 0.6–1.3)
EOSINOPHIL # BLD AUTO: 0.13 THOUSAND/ΜL (ref 0–0.61)
EOSINOPHIL NFR BLD AUTO: 3 % (ref 0–6)
ERYTHROCYTE [DISTWIDTH] IN BLOOD BY AUTOMATED COUNT: 11.6 % (ref 11.6–15.1)
EST. AVERAGE GLUCOSE BLD GHB EST-MCNC: 120 MG/DL
GFR SERPL CREATININE-BSD FRML MDRD: 75 ML/MIN/1.73SQ M
GLUCOSE P FAST SERPL-MCNC: 94 MG/DL (ref 65–99)
HBA1C MFR BLD: 5.8 %
HCT VFR BLD AUTO: 41 % (ref 34.8–46.1)
HDLC SERPL-MCNC: 55 MG/DL
HGB BLD-MCNC: 12.9 G/DL (ref 11.5–15.4)
IMM GRANULOCYTES # BLD AUTO: 0 THOUSAND/UL (ref 0–0.2)
IMM GRANULOCYTES NFR BLD AUTO: 0 % (ref 0–2)
LDLC SERPL CALC-MCNC: 114 MG/DL (ref 0–100)
LYMPHOCYTES # BLD AUTO: 1.18 THOUSANDS/ΜL (ref 0.6–4.47)
LYMPHOCYTES NFR BLD AUTO: 26 % (ref 14–44)
MCH RBC QN AUTO: 30 PG (ref 26.8–34.3)
MCHC RBC AUTO-ENTMCNC: 31.5 G/DL (ref 31.4–37.4)
MCV RBC AUTO: 95 FL (ref 82–98)
MONOCYTES # BLD AUTO: 0.54 THOUSAND/ΜL (ref 0.17–1.22)
MONOCYTES NFR BLD AUTO: 12 % (ref 4–12)
NEUTROPHILS # BLD AUTO: 2.58 THOUSANDS/ΜL (ref 1.85–7.62)
NEUTS SEG NFR BLD AUTO: 58 % (ref 43–75)
NONHDLC SERPL-MCNC: 132 MG/DL
NRBC BLD AUTO-RTO: 0 /100 WBCS
PLATELET # BLD AUTO: 244 THOUSANDS/UL (ref 149–390)
PMV BLD AUTO: 10.4 FL (ref 8.9–12.7)
POTASSIUM SERPL-SCNC: 3.5 MMOL/L (ref 3.5–5.3)
PROT SERPL-MCNC: 6.5 G/DL (ref 6.4–8.2)
RBC # BLD AUTO: 4.3 MILLION/UL (ref 3.81–5.12)
SODIUM SERPL-SCNC: 139 MMOL/L (ref 136–145)
TRIGL SERPL-MCNC: 88 MG/DL
WBC # BLD AUTO: 4.47 THOUSAND/UL (ref 4.31–10.16)

## 2020-12-14 PROCEDURE — 85025 COMPLETE CBC W/AUTO DIFF WBC: CPT | Performed by: FAMILY MEDICINE

## 2020-12-14 PROCEDURE — 80053 COMPREHEN METABOLIC PANEL: CPT | Performed by: FAMILY MEDICINE

## 2020-12-14 PROCEDURE — 80061 LIPID PANEL: CPT | Performed by: FAMILY MEDICINE

## 2020-12-14 PROCEDURE — 36415 COLL VENOUS BLD VENIPUNCTURE: CPT | Performed by: FAMILY MEDICINE

## 2020-12-14 PROCEDURE — 83036 HEMOGLOBIN GLYCOSYLATED A1C: CPT | Performed by: FAMILY MEDICINE

## 2021-01-07 ENCOUNTER — OFFICE VISIT (OUTPATIENT)
Dept: FAMILY MEDICINE CLINIC | Facility: CLINIC | Age: 81
End: 2021-01-07
Payer: MEDICARE

## 2021-01-07 VITALS
WEIGHT: 174 LBS | BODY MASS INDEX: 27.25 KG/M2 | SYSTOLIC BLOOD PRESSURE: 132 MMHG | HEART RATE: 56 BPM | TEMPERATURE: 96.9 F | RESPIRATION RATE: 16 BRPM | DIASTOLIC BLOOD PRESSURE: 76 MMHG

## 2021-01-07 DIAGNOSIS — D70.9 NEUTROPENIA, UNSPECIFIED TYPE (HCC): ICD-10-CM

## 2021-01-07 DIAGNOSIS — R73.01 IMPAIRED FASTING GLUCOSE: ICD-10-CM

## 2021-01-07 DIAGNOSIS — I10 ESSENTIAL HYPERTENSION: Primary | ICD-10-CM

## 2021-01-07 DIAGNOSIS — E78.00 PURE HYPERCHOLESTEROLEMIA: ICD-10-CM

## 2021-01-07 PROCEDURE — 99214 OFFICE O/P EST MOD 30 MIN: CPT | Performed by: FAMILY MEDICINE

## 2021-01-07 RX ORDER — IRBESARTAN 300 MG/1
300 TABLET ORAL
Qty: 90 TABLET | Refills: 3 | Status: SHIPPED | OUTPATIENT
Start: 2021-01-07 | End: 2021-07-14 | Stop reason: SDUPTHER

## 2021-01-07 RX ORDER — METOPROLOL TARTRATE 50 MG/1
50 TABLET, FILM COATED ORAL 2 TIMES DAILY
Qty: 180 TABLET | Refills: 3 | Status: SHIPPED | OUTPATIENT
Start: 2021-01-07 | End: 2021-07-14 | Stop reason: SDUPTHER

## 2021-01-07 RX ORDER — AMLODIPINE BESYLATE 2.5 MG/1
2.5 TABLET ORAL 2 TIMES DAILY
Qty: 180 TABLET | Refills: 3 | Status: SHIPPED | OUTPATIENT
Start: 2021-01-07 | End: 2021-07-14 | Stop reason: SDUPTHER

## 2021-01-07 NOTE — PROGRESS NOTES
Assessment/Plan:         Diagnoses and all orders for this visit:    Essential hypertension  -     CBC and differential  -     Comprehensive metabolic panel  -     irbesartan (AVAPRO) 300 mg tablet; Take 1 tablet (300 mg total) by mouth daily at bedtime  -     metoprolol tartrate (LOPRESSOR) 50 mg tablet; Take 1 tablet (50 mg total) by mouth 2 (two) times a day  -     amLODIPine (NORVASC) 2 5 mg tablet; Take 1 tablet (2 5 mg total) by mouth 2 (two) times a day    Pure hypercholesterolemia  -     TSH, 3rd generation with Free T4 reflex    Impaired fasting glucose  -     Hemoglobin A1C    Neutropenia, unspecified type ([facility] [address] )           Continue with current medications  I recommended  OTC potassium supplement 99 mg daily  Office visit 6 months with repeat labs at that time  Up-to-date with flu vaccine  Follow-up with Urology     Patient ID: Norbert Dill is a 79 y o  female  Followup visit  Medications reviewed  Hypertension BPs have been stable on current 3 drug regimen  on Amlodipine 2 5 mg daily, Valsartan 320 mg daily and Metoprolol 50 mg BID  Past history of hyponatremia and hypokalemia while on HCTZ  12/2020  creatinine 0 75  Electrolytes normal  K+ 3 5  Hgb 12 9  Lipid profile 12/2020 cholesterol 187  TGs 88  HDL 55    LFTs normal  05/2019 TSH 3 550  IFG 12/2020 FBS 94  A1c 5 8 decreased from  5 9 + FH type DM        Recent Results (from the past 672 hour(s))   CBC and differential    Collection Time: 12/14/20  8:12 AM   Result Value Ref Range    WBC 4 47 4 31 - 10 16 Thousand/uL    RBC 4 30 3 81 - 5 12 Million/uL    Hemoglobin 12 9 11 5 - 15 4 g/dL    Hematocrit 41 0 34 8 - 46 1 %    MCV 95 82 - 98 fL    MCH 30 0 26 8 - 34 3 pg    MCHC 31 5 31 4 - 37 4 g/dL    RDW 11 6 11 6 - 15 1 %    MPV 10 4 8 9 - 12 7 fL    Platelets 534 980 - 740 Thousands/uL    nRBC 0 /100 WBCs    Neutrophils Relative 58 43 - 75 %    Immat GRANS % 0 0 - 2 %    Lymphocytes Relative 26 14 - 44 %    Monocytes Relative 12 4 - 12 %    Eosinophils Relative 3 0 - 6 %    Basophils Relative 1 0 - 1 %    Neutrophils Absolute 2 58 1 85 - 7 62 Thousands/µL    Immature Grans Absolute 0 00 0 00 - 0 20 Thousand/uL    Lymphocytes Absolute 1 18 0 60 - 4 47 Thousands/µL    Monocytes Absolute 0 54 0 17 - 1 22 Thousand/µL    Eosinophils Absolute 0 13 0 00 - 0 61 Thousand/µL    Basophils Absolute 0 04 0 00 - 0 10 Thousands/µL   Comprehensive metabolic panel    Collection Time: 12/14/20  8:12 AM   Result Value Ref Range    Sodium 139 136 - 145 mmol/L    Potassium 3 5 3 5 - 5 3 mmol/L    Chloride 106 100 - 108 mmol/L    CO2 26 21 - 32 mmol/L    ANION GAP 7 4 - 13 mmol/L    BUN 16 5 - 25 mg/dL    Creatinine 0 75 0 60 - 1 30 mg/dL    Glucose, Fasting 94 65 - 99 mg/dL    Calcium 8 7 8 3 - 10 1 mg/dL    Corrected Calcium 9 2 8 3 - 10 1 mg/dL    AST 15 5 - 45 U/L    ALT 23 12 - 78 U/L    Alkaline Phosphatase 65 46 - 116 U/L    Total Protein 6 5 6 4 - 8 2 g/dL    Albumin 3 4 (L) 3 5 - 5 0 g/dL    Total Bilirubin 0 63 0 20 - 1 00 mg/dL    eGFR 75 ml/min/1 73sq m   Hemoglobin A1C    Collection Time: 12/14/20  8:12 AM   Result Value Ref Range    Hemoglobin A1C 5 8 (H) Normal 3 8-5 6%; PreDiabetic 5 7-6 4%; Diabetic >=6 5%; Glycemic control for adults with diabetes <7 0% %     mg/dl   Lipid panel    Collection Time: 12/14/20  8:12 AM   Result Value Ref Range    Cholesterol 187 50 - 200 mg/dL    Triglycerides 88 <=150 mg/dL    HDL, Direct 55 >=40 mg/dL    LDL Calculated 114 (H) 0 - 100 mg/dL    Non-HDL-Chol (CHOL-HDL) 132 mg/dl     Lab Results   Component Value Date    [id number] 3 550 05/21/2019         The following portions of the patient's history were reviewed and updated as appropriate: allergies, current medications, past family history, past medical history, past social history, past surgical history and problem list     Review of Systems   Constitutional: Negative for appetite change, chills, fatigue, fever and unexpected weight change     HENT: Negative for congestion, ear pain, hearing loss, rhinorrhea, sore throat and trouble swallowing  Chronic nasal congestion improved with Flonase  Eyes: Negative for visual disturbance  Respiratory: Negative for cough, shortness of breath and wheezing  Cardiovascular: Negative for chest pain, palpitations and leg swelling  01/2020 admission for substernal chest pain  Patient was initialy seen at urgent care  EKG NSR with frequent PVCs  she was sent to [facility]  Initial EKG NSR with fusion complexes  No acute changes  2nd EKG sinus bradycardia with PACs  troponins x 3 negative  Chest x-ray no active disease  Stress echocardiogram normal   EF 60%  Gastrointestinal: Negative for abdominal pain, blood in stool, constipation, diarrhea, nausea and vomiting  Chronic constipation  Colonoscopy 03/2019 normal except for diverticulosis  + FH colon CA sister  Endocrine: Negative for polydipsia and polyuria  Past history of borderline abnormal TSH 05/2019 TSH 3 550   06/2017  TSH 3 840  repeat TSH 07/2017 2 630  thyroid ABs negative  05/2018 TSH 2 830   02/2015 DEXA scan normal    Genitourinary: Negative for difficulty urinating, dysuria and hematuria  History of recurrent UTIs followed by urology  She completed a course of Cipro last month for UTI symptoms associated with urinary incontinence  On Hiprex  02/2019 kidney bladder ultrasound normal minimal PVR  29 ML  Simple cyst left adnexa without significant change  pelvic u/s 08/2016 stable simple left ovarian cyst  01/2017  normal at 9 5 followed by GYN ,    Musculoskeletal: Negative for arthralgias and myalgias  OA right knee s/p steroid injection by ortho with symptom relief  X rays right knee 06/2020  Reviewed  Prior MRI right knee showed  meniscal tear  Skin: Negative for rash  Allergic/Immunologic: Positive for environmental allergies  Neurological: Negative for dizziness and headaches  Hematological: Negative for adenopathy  Does not bruise/bleed easily  Past history of mild leukopenia   05/2019 CBC WBC 4880  Hemoglobin 12 9  MCV 95  Platelet count 418,079   06/2018 CBC WBC 5790  Hemoglobin 13  MCV 95  Platelet count 591334  Vitamin B12 589  Folate greater than 20   05/2018 CBC WBC 3,900  Hgb 13  Platelets 378,924   20/0640 CBC WBC 5270  Hemoglobin 13 1  Platelet count 294215   06/2017 CBC WBC 4,870  Hemoglobin 12 9  Platelet count 906,725    Lab Results       Component                Value               Date                       WBC                      4 47                12/14/2020                 HGB                      12 9                12/14/2020                 HCT                      41 0                12/14/2020                 MCV                      95                  12/14/2020                 PLT                      244                 12/14/2020                       Psychiatric/Behavioral: Negative for dysphoric mood and sleep disturbance  Objective:      /76   Pulse 56   Temp (!) 96 9 °F (36 1 °C)   Resp 16   Wt 78 9 kg (174 lb)   BMI 27 25 kg/m²     BP Readings from Last 3 Encounters:   01/07/21 132/76   10/15/20 144/64   07/07/20 124/62     Wt Readings from Last 3 Encounters:   01/07/21 78 9 kg (174 lb)   10/15/20 80 1 kg (176 lb 8 oz)   07/07/20 78 kg (172 lb)        Physical Exam  Vitals signs and nursing note reviewed  Constitutional:       General: She is not in acute distress  Appearance: She is well-developed  HENT:      Right Ear: Tympanic membrane and ear canal normal       Left Ear: Tympanic membrane and ear canal normal    Eyes:      General: No scleral icterus  Extraocular Movements: Extraocular movements intact  Conjunctiva/sclera: Conjunctivae normal       Pupils: Pupils are equal, round, and reactive to light  Neck:      Thyroid: No thyroid mass or thyromegaly  Vascular: No carotid bruit or JVD  Trachea: No tracheal deviation  Cardiovascular:      Rate and Rhythm: Regular rhythm  Bradycardia present  Heart sounds: Normal heart sounds  No murmur  No gallop  Comments:  Apical rate 52  Pulmonary:      Effort: Pulmonary effort is normal  No respiratory distress  Breath sounds: Normal breath sounds  No wheezing or rales  Abdominal:      General: Bowel sounds are normal  There is no distension or abdominal bruit  Palpations: Abdomen is soft  There is no hepatomegaly, splenomegaly or mass  Tenderness: There is no abdominal tenderness  There is no guarding or rebound  Musculoskeletal:      Right lower leg: No edema  Left lower leg: No edema  Lymphadenopathy:      Cervical: No cervical adenopathy  Upper Body:      Right upper body: No supraclavicular adenopathy  Left upper body: No supraclavicular adenopathy  Skin:     Findings: No rash  Nails: There is no clubbing  Neurological:      General: No focal deficit present  Mental Status: She is alert and oriented to person, place, and time     Psychiatric:         Mood and Affect: Mood normal

## 2021-03-17 ENCOUNTER — HOSPITAL ENCOUNTER (OUTPATIENT)
Dept: RADIOLOGY | Age: 81
Discharge: HOME | End: 2021-03-17
Payer: MEDICARE

## 2021-03-17 VITALS — WEIGHT: 174 LBS | HEIGHT: 67 IN | BODY MASS INDEX: 27.31 KG/M2

## 2021-03-17 DIAGNOSIS — Z12.31 ENCOUNTER FOR SCREENING MAMMOGRAM FOR MALIGNANT NEOPLASM OF BREAST: ICD-10-CM

## 2021-03-17 PROCEDURE — 77067 SCR MAMMO BI INCL CAD: CPT

## 2021-03-17 PROCEDURE — 77063 BREAST TOMOSYNTHESIS BI: CPT

## 2021-05-24 ENCOUNTER — TRANSCRIBE ORDERS (OUTPATIENT)
Dept: ADMINISTRATIVE | Age: 81
End: 2021-05-24

## 2021-05-24 ENCOUNTER — APPOINTMENT (OUTPATIENT)
Dept: LAB | Age: 81
End: 2021-05-24
Payer: MEDICARE

## 2021-05-24 DIAGNOSIS — N30.01 ACUTE CYSTITIS WITH HEMATURIA: ICD-10-CM

## 2021-05-24 DIAGNOSIS — N30.01 ACUTE CYSTITIS WITH HEMATURIA: Primary | ICD-10-CM

## 2021-05-24 LAB
BUN SERPL-MCNC: 22 MG/DL (ref 5–25)
CREAT SERPL-MCNC: 0.85 MG/DL (ref 0.6–1.3)
GFR SERPL CREATININE-BSD FRML MDRD: 64 ML/MIN/1.73SQ M

## 2021-05-24 PROCEDURE — 36415 COLL VENOUS BLD VENIPUNCTURE: CPT

## 2021-05-24 PROCEDURE — 84520 ASSAY OF UREA NITROGEN: CPT

## 2021-05-24 PROCEDURE — 82565 ASSAY OF CREATININE: CPT

## 2021-06-29 ENCOUNTER — APPOINTMENT (OUTPATIENT)
Dept: LAB | Age: 81
End: 2021-06-29
Payer: MEDICARE

## 2021-06-29 LAB
ALBUMIN SERPL BCP-MCNC: 3.4 G/DL (ref 3.5–5)
ALP SERPL-CCNC: 63 U/L (ref 46–116)
ALT SERPL W P-5'-P-CCNC: 31 U/L (ref 12–78)
ANION GAP SERPL CALCULATED.3IONS-SCNC: 3 MMOL/L (ref 4–13)
AST SERPL W P-5'-P-CCNC: 17 U/L (ref 5–45)
BASOPHILS # BLD AUTO: 0.06 THOUSANDS/ΜL (ref 0–0.1)
BASOPHILS NFR BLD AUTO: 1 % (ref 0–1)
BILIRUB SERPL-MCNC: 0.68 MG/DL (ref 0.2–1)
BUN SERPL-MCNC: 17 MG/DL (ref 5–25)
CALCIUM ALBUM COR SERPL-MCNC: 9.2 MG/DL (ref 8.3–10.1)
CALCIUM SERPL-MCNC: 8.7 MG/DL (ref 8.3–10.1)
CHLORIDE SERPL-SCNC: 105 MMOL/L (ref 100–108)
CO2 SERPL-SCNC: 29 MMOL/L (ref 21–32)
CREAT SERPL-MCNC: 0.8 MG/DL (ref 0.6–1.3)
EOSINOPHIL # BLD AUTO: 0.22 THOUSAND/ΜL (ref 0–0.61)
EOSINOPHIL NFR BLD AUTO: 4 % (ref 0–6)
ERYTHROCYTE [DISTWIDTH] IN BLOOD BY AUTOMATED COUNT: 11.9 % (ref 11.6–15.1)
EST. AVERAGE GLUCOSE BLD GHB EST-MCNC: 120 MG/DL
GFR SERPL CREATININE-BSD FRML MDRD: 69 ML/MIN/1.73SQ M
GLUCOSE P FAST SERPL-MCNC: 92 MG/DL (ref 65–99)
HBA1C MFR BLD: 5.8 %
HCT VFR BLD AUTO: 40.7 % (ref 34.8–46.1)
HGB BLD-MCNC: 13.3 G/DL (ref 11.5–15.4)
IMM GRANULOCYTES # BLD AUTO: 0.01 THOUSAND/UL (ref 0–0.2)
IMM GRANULOCYTES NFR BLD AUTO: 0 % (ref 0–2)
LYMPHOCYTES # BLD AUTO: 1.53 THOUSANDS/ΜL (ref 0.6–4.47)
LYMPHOCYTES NFR BLD AUTO: 28 % (ref 14–44)
MCH RBC QN AUTO: 30.5 PG (ref 26.8–34.3)
MCHC RBC AUTO-ENTMCNC: 32.7 G/DL (ref 31.4–37.4)
MCV RBC AUTO: 93 FL (ref 82–98)
MONOCYTES # BLD AUTO: 0.71 THOUSAND/ΜL (ref 0.17–1.22)
MONOCYTES NFR BLD AUTO: 13 % (ref 4–12)
NEUTROPHILS # BLD AUTO: 2.92 THOUSANDS/ΜL (ref 1.85–7.62)
NEUTS SEG NFR BLD AUTO: 54 % (ref 43–75)
NRBC BLD AUTO-RTO: 0 /100 WBCS
PLATELET # BLD AUTO: 255 THOUSANDS/UL (ref 149–390)
PMV BLD AUTO: 10.5 FL (ref 8.9–12.7)
POTASSIUM SERPL-SCNC: 3.5 MMOL/L (ref 3.5–5.3)
PROT SERPL-MCNC: 6.5 G/DL (ref 6.4–8.2)
RBC # BLD AUTO: 4.36 MILLION/UL (ref 3.81–5.12)
SODIUM SERPL-SCNC: 137 MMOL/L (ref 136–145)
T4 FREE SERPL-MCNC: 0.84 NG/DL (ref 0.76–1.46)
TSH SERPL DL<=0.05 MIU/L-ACNC: 4.03 UIU/ML (ref 0.36–3.74)
WBC # BLD AUTO: 5.45 THOUSAND/UL (ref 4.31–10.16)

## 2021-06-29 PROCEDURE — 36415 COLL VENOUS BLD VENIPUNCTURE: CPT | Performed by: FAMILY MEDICINE

## 2021-06-29 PROCEDURE — 85025 COMPLETE CBC W/AUTO DIFF WBC: CPT | Performed by: FAMILY MEDICINE

## 2021-06-29 PROCEDURE — 80053 COMPREHEN METABOLIC PANEL: CPT | Performed by: FAMILY MEDICINE

## 2021-06-29 PROCEDURE — 83036 HEMOGLOBIN GLYCOSYLATED A1C: CPT | Performed by: FAMILY MEDICINE

## 2021-06-29 PROCEDURE — 84443 ASSAY THYROID STIM HORMONE: CPT | Performed by: FAMILY MEDICINE

## 2021-06-29 PROCEDURE — 84439 ASSAY OF FREE THYROXINE: CPT | Performed by: FAMILY MEDICINE

## 2021-07-14 ENCOUNTER — OFFICE VISIT (OUTPATIENT)
Dept: FAMILY MEDICINE CLINIC | Facility: CLINIC | Age: 81
End: 2021-07-14
Payer: MEDICARE

## 2021-07-14 VITALS
HEART RATE: 56 BPM | BODY MASS INDEX: 27 KG/M2 | SYSTOLIC BLOOD PRESSURE: 132 MMHG | WEIGHT: 172 LBS | DIASTOLIC BLOOD PRESSURE: 84 MMHG | RESPIRATION RATE: 16 BRPM | TEMPERATURE: 96 F | HEIGHT: 67 IN

## 2021-07-14 DIAGNOSIS — Z00.00 MEDICARE ANNUAL WELLNESS VISIT, SUBSEQUENT: ICD-10-CM

## 2021-07-14 DIAGNOSIS — I10 ESSENTIAL HYPERTENSION: Primary | ICD-10-CM

## 2021-07-14 DIAGNOSIS — Z87.440 HISTORY OF RECURRENT UTIS: ICD-10-CM

## 2021-07-14 DIAGNOSIS — E03.8 SUBCLINICAL HYPOTHYROIDISM: ICD-10-CM

## 2021-07-14 DIAGNOSIS — H25.9 AGE-RELATED CATARACT OF BOTH EYES, UNSPECIFIED AGE-RELATED CATARACT TYPE: ICD-10-CM

## 2021-07-14 DIAGNOSIS — E78.00 PURE HYPERCHOLESTEROLEMIA: ICD-10-CM

## 2021-07-14 DIAGNOSIS — R73.01 IMPAIRED FASTING GLUCOSE: ICD-10-CM

## 2021-07-14 PROBLEM — D70.9 NEUTROPENIA (HCC): Status: RESOLVED | Noted: 2020-01-08 | Resolved: 2021-07-14

## 2021-07-14 PROCEDURE — 1123F ACP DISCUSS/DSCN MKR DOCD: CPT | Performed by: FAMILY MEDICINE

## 2021-07-14 PROCEDURE — G0439 PPPS, SUBSEQ VISIT: HCPCS | Performed by: FAMILY MEDICINE

## 2021-07-14 PROCEDURE — 99214 OFFICE O/P EST MOD 30 MIN: CPT | Performed by: FAMILY MEDICINE

## 2021-07-14 RX ORDER — AMLODIPINE BESYLATE 2.5 MG/1
2.5 TABLET ORAL 2 TIMES DAILY
Qty: 180 TABLET | Refills: 3 | Status: SHIPPED | OUTPATIENT
Start: 2021-07-14 | End: 2022-01-31 | Stop reason: SDUPTHER

## 2021-07-14 RX ORDER — METOPROLOL TARTRATE 50 MG/1
50 TABLET, FILM COATED ORAL 2 TIMES DAILY
Qty: 180 TABLET | Refills: 3 | Status: SHIPPED | OUTPATIENT
Start: 2021-07-14

## 2021-07-14 RX ORDER — IRBESARTAN 300 MG/1
300 TABLET ORAL
Qty: 90 TABLET | Refills: 3 | Status: SHIPPED | OUTPATIENT
Start: 2021-07-14

## 2021-07-14 NOTE — PATIENT INSTRUCTIONS
Medicare Preventive Visit Patient Instructions  Thank you for completing your Welcome to Medicare Visit or Medicare Annual Wellness Visit today  Your next wellness visit will be due in one year (7/15/2022)  The screening/preventive services that you may require over the next 5-10 years are detailed below  Some tests may not apply to you based off risk factors and/or age  Screening tests ordered at today's visit but not completed yet may show as past due  Also, please note that scanned in results may not display below  Preventive Screenings:  Service Recommendations Previous Testing/Comments   Colorectal Cancer Screening  * Colonoscopy    * Fecal Occult Blood Test (FOBT)/Fecal Immunochemical Test (FIT)  * Fecal DNA/Cologuard Test  * Flexible Sigmoidoscopy Age: 54-65 years old   Colonoscopy: every 10 years (may be performed more frequently if at higher risk)  OR  FOBT/FIT: every 1 year  OR  Cologuard: every 3 years  OR  Sigmoidoscopy: every 5 years  Screening may be recommended earlier than age 48 if at higher risk for colorectal cancer  Also, an individualized decision between you and your healthcare provider will decide whether screening between the ages of 74-80 would be appropriate  Colonoscopy: 03/07/2019  FOBT/FIT: Not on file  Cologuard: Not on file  Sigmoidoscopy: Not on file    Screening Current     Breast Cancer Screening Age: 36 years old  Frequency: every 1-2 years  Not required if history of left and right mastectomy Mammogram: 03/17/2021    Screening Current   Cervical Cancer Screening Between the ages of 21-29, pap smear recommended once every 3 years  Between the ages of 33-67, can perform pap smear with HPV co-testing every 5 years     Recommendations may differ for women with a history of total hysterectomy, cervical cancer, or abnormal pap smears in past  Pap Smear: 02/19/2020    Screening Not Indicated   Hepatitis C Screening Once for adults born between 1945 and 1965  More frequently in patients at high risk for Hepatitis C Hep C Antibody: Not on file        Diabetes Screening 1-2 times per year if you're at risk for diabetes or have pre-diabetes Fasting glucose: 92 mg/dL   A1C: 5 8 %    Screening Current   Cholesterol Screening Once every 5 years if you don't have a lipid disorder  May order more often based on risk factors  Lipid panel: 12/14/2020    Screening Not Indicated  History Lipid Disorder     Other Preventive Screenings Covered by Medicare:  1  Abdominal Aortic Aneurysm (AAA) Screening: covered once if your at risk  You're considered to be at risk if you have a family history of AAA  2  Lung Cancer Screening: covers low dose CT scan once per year if you meet all of the following conditions: (1) Age 50-69; (2) No signs or symptoms of lung cancer; (3) Current smoker or have quit smoking within the last 15 years; (4) You have a tobacco smoking history of at least 30 pack years (packs per day multiplied by number of years you smoked); (5) You get a written order from a healthcare provider  3  Glaucoma Screening: covered annually if you're considered high risk: (1) You have diabetes OR (2) Family history of glaucoma OR (3)  aged 48 and older OR (3)  American aged 72 and older  3  Osteoporosis Screening: covered every 2 years if you meet one of the following conditions: (1) You're estrogen deficient and at risk for osteoporosis based off medical history and other findings; (2) Have a vertebral abnormality; (3) On glucocorticoid therapy for more than 3 months; (4) Have primary hyperparathyroidism; (5) On osteoporosis medications and need to assess response to drug therapy  · Last bone density test (DXA Scan): 02/05/2015  5  HIV Screening: covered annually if you're between the age of 12-76  Also covered annually if you are younger than 13 and older than 72 with risk factors for HIV infection   For pregnant patients, it is covered up to 3 times per pregnancy  Immunizations:  Immunization Recommendations   Influenza Vaccine Annual influenza vaccination during flu season is recommended for all persons aged >= 6 months who do not have contraindications   Pneumococcal Vaccine (Prevnar and Pneumovax)  * Prevnar = PCV13  * Pneumovax = PPSV23   Adults 25-60 years old: 1-3 doses may be recommended based on certain risk factors  Adults 72 years old: Prevnar (PCV13) vaccine recommended followed by Pneumovax (PPSV23) vaccine  If already received PPSV23 since turning 65, then PCV13 recommended at least one year after PPSV23 dose  Hepatitis B Vaccine 3 dose series if at intermediate or high risk (ex: diabetes, end stage renal disease, liver disease)   Tetanus (Td) Vaccine - COST NOT COVERED BY MEDICARE PART B Following completion of primary series, a booster dose should be given every 10 years to maintain immunity against tetanus  Td may also be given as tetanus wound prophylaxis  Tdap Vaccine - COST NOT COVERED BY MEDICARE PART B Recommended at least once for all adults  For pregnant patients, recommended with each pregnancy  Shingles Vaccine (Shingrix) - COST NOT COVERED BY MEDICARE PART B  2 shot series recommended in those aged 48 and above     Health Maintenance Due:      Topic Date Due    Breast Cancer Screening: Mammogram  03/17/2022    Colorectal Cancer Screening  03/07/2024     Immunizations Due:      Topic Date Due    DTaP,Tdap,and Td Vaccines (1 - Tdap) 02/15/1960    Influenza Vaccine (1) 09/01/2021     Advance Directives   What are advance directives? Advance directives are legal documents that state your wishes and plans for medical care  These plans are made ahead of time in case you lose your ability to make decisions for yourself  Advance directives can apply to any medical decision, such as the treatments you want, and if you want to donate organs  What are the types of advance directives?   There are many types of advance directives, and each state has rules about how to use them  You may choose a combination of any of the following:  · Living will: This is a written record of the treatment you want  You can also choose which treatments you do not want, which to limit, and which to stop at a certain time  This includes surgery, medicine, IV fluid, and tube feedings  · Durable power of  for healthcare [facility]): This is a written record that states who you want to make healthcare choices for you when you are unable to make them for yourself  This person, called a proxy, is usually a family member or a friend  You may choose more than 1 proxy  · Do not resuscitate (DNR) order:  A DNR order is used in case your heart stops beating or you stop breathing  It is a request not to have certain forms of treatment, such as CPR  A DNR order may be included in other types of advance directives  · Medical directive: This covers the care that you want if you are in a coma, near death, or unable to make decisions for yourself  You can list the treatments you want for each condition  Treatment may include pain medicine, surgery, blood transfusions, dialysis, IV or tube feedings, and a ventilator (breathing machine)  · Values history: This document has questions about your views, beliefs, and how you feel and think about life  This information can help others choose the care that you would choose  Why are advance directives important? An advance directive helps you control your care  Although spoken wishes may be used, it is better to have your wishes written down  Spoken wishes can be misunderstood, or not followed  Treatments may be given even if you do not want them  An advance directive may make it easier for your family to make difficult choices about your care  Urinary Incontinence   Urinary incontinence (UI)  is when you lose control of your bladder  UI develops because your bladder cannot store or empty urine properly   The 3 most common types of UI are stress incontinence, urge incontinence, or both  Medicines:   · May be given to help strengthen your bladder control  Report any side effects of medication to your healthcare provider  Do pelvic muscle exercises often:  Your pelvic muscles help you stop urinating  Squeeze these muscles tight for 5 seconds, then relax for 5 seconds  Gradually work up to squeezing for 10 seconds  Do 3 sets of 15 repetitions a day, or as directed  This will help strengthen your pelvic muscles and improve bladder control  Train your bladder:  Go to the bathroom at set times, such as every 2 hours, even if you do not feel the urge to go  You can also try to hold your urine when you feel the urge to go  For example, hold your urine for 5 minutes when you feel the urge to go  As that becomes easier, hold your urine for 10 minutes  Self-care:   · Keep a UI record  Write down how often you leak urine and how much you leak  Make a note of what you were doing when you leaked urine  · Drink liquids as directed  You may need to limit the amount of liquid you drink to help control your urine leakage  Do not drink any liquid right before you go to bed  Limit or do not have drinks that contain caffeine or alcohol  · Prevent constipation  Eat a variety of high-fiber foods  Good examples are high-fiber cereals, beans, vegetables, and whole-grain breads  Walking is the best way to trigger your intestines to have a bowel movement  · Exercise regularly and maintain a healthy weight  Weight loss and exercise will decrease pressure on your bladder and help you control your leakage  · Use a catheter as directed  to help empty your bladder  A catheter is a tiny, plastic tube that is put into your bladder to drain your urine  · Go to behavior therapy as directed  Behavior therapy may be used to help you learn to control your urge to urinate      Weight Management   Why it is important to manage your weight:  Being overweight increases your risk of health conditions such as heart disease, high blood pressure, type 2 diabetes, and certain types of cancer  It can also increase your risk for osteoarthritis, sleep apnea, and other respiratory problems  Aim for a slow, steady weight loss  Even a small amount of weight loss can lower your risk of health problems  How to lose weight safely:  A safe and healthy way to lose weight is to eat fewer calories and get regular exercise  You can lose up about 1 pound a week by decreasing the number of calories you eat by 500 calories each day  Healthy meal plan for weight management:  A healthy meal plan includes a variety of foods, contains fewer calories, and helps you stay healthy  A healthy meal plan includes the following:  · Eat whole-grain foods more often  A healthy meal plan should contain fiber  Fiber is the part of grains, fruits, and vegetables that is not broken down by your body  Whole-grain foods are healthy and provide extra fiber in your diet  Some examples of whole-grain foods are whole-wheat breads and pastas, oatmeal, brown rice, and bulgur  · Eat a variety of vegetables every day  Include dark, leafy greens such as spinach, kale, anastasia greens, and mustard greens  Eat yellow and orange vegetables such as carrots, sweet potatoes, and winter squash  · Eat a variety of fruits every day  Choose fresh or canned fruit (canned in its own juice or light syrup) instead of juice  Fruit juice has very little or no fiber  · Eat low-fat dairy foods  Drink fat-free (skim) milk or 1% milk  Eat fat-free yogurt and low-fat cottage cheese  Try low-fat cheeses such as mozzarella and other reduced-fat cheeses  · Choose meat and other protein foods that are low in fat  Choose beans or other legumes such as split peas or lentils  Choose fish, skinless poultry (chicken or turkey), or lean cuts of red meat (beef or pork)  Before you cook meat or poultry, cut off any visible fat     · Use less fat and oil  Try baking foods instead of frying them  Add less fat, such as margarine, sour cream, regular salad dressing and mayonnaise to foods  Eat fewer high-fat foods  Some examples of high-fat foods include french fries, doughnuts, ice cream, and cakes  · Eat fewer sweets  Limit foods and drinks that are high in sugar  This includes candy, cookies, regular soda, and sweetened drinks  Exercise:  Exercise at least 30 minutes per day on most days of the week  Some examples of exercise include walking, biking, dancing, and swimming  You can also fit in more physical activity by taking the stairs instead of the elevator or parking farther away from stores  Ask your healthcare provider about the best exercise plan for you  © Copyright [employer] 2018 Information is for End User's use only and may not be sold, redistributed or otherwise used for commercial purposes   All illustrations and images included in CareNotes® are the copyrighted property of [employer]  or [address] [place] [address]

## 2021-07-14 NOTE — PROGRESS NOTES
Assessment and Plan:     Problem List Items Addressed This Visit        Cardiovascular and Mediastinum    Hypertension - Primary    Relevant Medications    irbesartan (AVAPRO) 300 mg tablet    metoprolol tartrate (LOPRESSOR) 50 mg tablet    amLODIPine (NORVASC) 2 5 mg tablet    Other Relevant Orders    Comprehensive metabolic panel    CBC and differential       Other    Hyperlipidemia      Other Visit Diagnoses     Impaired fasting glucose        Relevant Orders    Hemoglobin A1C    Subclinical hypothyroidism        Relevant Medications    metoprolol tartrate (LOPRESSOR) 50 mg tablet    Other Relevant Orders    TSH, 3rd generation with Free T4 reflex    Age-related cataract of both eyes, unspecified age-related cataract type        Medicare annual wellness visit, subsequent               Preventive health issues were discussed with patient, and age appropriate screening tests were ordered as noted in patient's After Visit Summary  Personalized health advice and appropriate referrals for health education or preventive services given if needed, as noted in patient's After Visit Summary       History of Present Illness:     Patient presents for Medicare Annual Wellness visit    Patient Care Team:  Carter Cabrera MD as PCP - Lind Dakins, MD Jennifer Inch, MD (Colon and Rectal Surgery)     Problem List:     Patient Active Problem List   Diagnosis    Cyst of left ovary    Diverticulosis    Hyperlipidemia    Hypertension    Osteoarthritis of knee    Post-menopausal bleeding    Gastroesophageal reflux disease without esophagitis    Family history of colon cancer      Past Medical and Surgical History:     Past Medical History:   Diagnosis Date    Accelerated essential hypertension     last assessed 08/17/2015    Depression with anxiety     last assesed 08/16/2012    Neutropenia ([address] ) 1/8/2020    Strep pharyngitis 8/18/2019     Past Surgical History:   Procedure Laterality Date    BLEPHAROPLASTY upper lid w/excessive skin    CARDIOVASCULAR STRESS TEST      onset June 2005    COLONOSCOPY      DILATION AND CURETTAGE, DIAGNOSTIC / THERAPEUTIC      ENDOMETRIAL BIOPSY      negative for dysplasia, hyperplasia and malignancy, resolved 03/25/2013    TUBAL LIGATION        Family History:     Family History   Problem Relation Age of Onset    Heart disease Father         cardiac disorder    Skin cancer Father     Colon cancer Sister 80    Heart disease Family     Hypertension Family     Coronary artery disease Family     No Known Problems Daughter     No Known Problems Sister     No Known Problems Sister     No Known Problems Daughter     No Known Problems Daughter     No Known Problems Maternal Aunt     No Known Problems Maternal Aunt     No Known Problems Maternal Aunt     No Known Problems Paternal Aunt     No Known Problems Paternal Aunt     No Known Problems Paternal Aunt     No Known Problems Mother     No Known Problems Maternal Grandmother     No Known Problems Maternal Grandfather     No Known Problems Paternal Grandmother     No Known Problems Paternal Grandfather       Social History:     Social History     Socioeconomic History    Marital status:       Spouse name: None    Number of children: None    Years of education: None    Highest education level: None   Occupational History    None   Tobacco Use    Smoking status: Never Smoker    Smokeless tobacco: Never Used   Substance and Sexual Activity    Alcohol use: Yes     Comment: rare    Drug use: No    Sexual activity: None   Other Topics Concern    None   Social History Narrative    Denied history of home environment domestic violence     Social Determinants of Health     Financial Resource Strain:     Difficulty of Paying Living Expenses:    Food Insecurity:     Worried About Running Out of Food in the Last Year:     [address] in the Last Year:    Transportation Needs:     Lack of Transportation (Medical):  Lack of Transportation (Non-Medical):    Physical Activity:     Days of Exercise per Week:     Minutes of Exercise per Session:    Stress:     Feeling of Stress :    Social Connections:     Frequency of Communication with Friends and Family:     Frequency of Social Gatherings with Friends and Family:     Attends Orthodox Services:     Active Member of Clubs or Organizations:     Attends Club or Organization Meetings:     Marital Status:    Intimate Partner Violence:     Fear of Current or Ex-Partner:     Emotionally Abused:     Physically Abused:     Sexually Abused:       Medications and Allergies:     Current Outpatient Medications   Medication Sig Dispense Refill    amLODIPine (NORVASC) 2 5 mg tablet Take 1 tablet (2 5 mg total) by mouth 2 (two) times a day 180 tablet 3    Calcium Carb-Cholecalciferol (CALCIUM 1000 + D) 1000-800 MG-UNIT TABS Take by mouth      Cholecalciferol 2000 units CAPS Take 1 capsule by mouth      irbesartan (AVAPRO) 300 mg tablet Take 1 tablet (300 mg total) by mouth daily at bedtime 90 tablet 3    methenamine hippurate (HIPREX) 1 g tablet Take 1 g by mouth      metoprolol tartrate (LOPRESSOR) 50 mg tablet Take 1 tablet (50 mg total) by mouth 2 (two) times a day 180 tablet 3    multivitamin (THERAGRAN) TABS Take 1 tablet by mouth       No current facility-administered medications for this visit       Allergies   Allergen Reactions    Penicillins Rash      Immunizations:     Immunization History   Administered Date(s) Administered    INFLUENZA 09/28/2019, 10/05/2020    Influenza Split High Dose Preservative Free IM 09/18/2013, 09/23/2014, 11/17/2015, 11/30/2016, 12/12/2017, 10/05/2020    Influenza, high dose seasonal 0 7 mL 11/05/2018, 09/28/2019    Influenza, seasonal, injectable 1939, 09/10/2012    Pneumococcal Conjugate 13-Valent 11/17/2015    Pneumococcal Polysaccharide PPV23 11/02/2006    SARS-CoV-2 / COVID-19 mRNA IM (Jocelyn Yung) 02/03/2021, 03/03/2021    Td (adult), adsorbed 12/29/2010      Health Maintenance:         Topic Date Due    Breast Cancer Screening: Mammogram  03/17/2022    Colorectal Cancer Screening  03/07/2024         Topic Date Due    DTaP,Tdap,and Td Vaccines (1 - Tdap) 02/15/1960    Influenza Vaccine (1) 09/01/2021      Medicare Health Risk Assessment:     /84 (BP Location: Left arm, Patient Position: Sitting, Cuff Size: Large)   Pulse 56   Temp (!) 96 °F (35 6 °C)   Resp 16   Ht 5' 7" (1 702 m)   Wt 78 kg (172 lb)   BMI 26 94 kg/m²      [employer] is here for her Subsequent Wellness visit  Last Medicare Wellness visit information reviewed, patient interviewed and updates made to the record today  Health Risk Assessment:   Patient rates overall health as very good  Patient feels that their physical health rating is same  Patient is very satisfied with their life  Eyesight was rated as same  Hearing was rated as same  Patient feels that their emotional and mental health rating is same  Patients states they are never, rarely angry  Patient states they are sometimes unusually tired/fatigued  Pain experienced in the last 7 days has been some  Patient's pain rating has been 2/10  Patient states that she has experienced no weight loss or gain in last 6 months  Depression Screening:   PHQ-2 Score: 0      Fall Risk Screening: In the past year, patient has experienced: no history of falling in past year      Urinary Incontinence Screening:   Patient has leaked urine accidently in the last six months  UI managed without medication  She is followed by Urology     Home Safety:  Patient does not have trouble with stairs inside or outside of their home  Patient has working smoke alarms and has working carbon monoxide detector  Home safety hazards include: none  Nutrition:   Current diet is Regular  Medications:   Patient is currently taking over-the-counter supplements   OTC medications include: see medication list  Patient is able to manage medications  Activities of Daily Living (ADLs)/Instrumental Activities of Daily Living (IADLs):   Walk and transfer into and out of bed and chair?: Yes  Dress and groom yourself?: Yes    Bathe or shower yourself?: Yes    Feed yourself? Yes  Do your laundry/housekeeping?: Yes  Manage your money, pay your bills and track your expenses?: Yes  Make your own meals?: Yes    Do your own shopping?: Yes    Previous Hospitalizations:   Any hospitalizations or ED visits within the last 12 months?: No      Advance Care Planning:   Living will: No    Advanced directive: No      Cognitive Screening:   Provider or family/friend/caregiver concerned regarding cognition?: No    PREVENTIVE SCREENINGS      Cardiovascular Screening:    General: Screening Not Indicated and History Lipid Disorder      Diabetes Screening:     General: Screening Current      Colorectal Cancer Screening:     General: Screening Current      Breast Cancer Screening:     General: Screening Current      Cervical Cancer Screening:    General: Screening Not Indicated      Osteoporosis Screening:    General: Screening Current      Abdominal Aortic Aneurysm (AAA) Screening:        General: Screening Not Indicated      Lung Cancer Screening:     General: Screening Not Indicated      Hepatitis C Screening:    General: Screening Not Indicated    Screening, Brief Intervention, and Referral to Treatment (SBIRT)    Screening  Typical number of drinks in a day: 0  Typical number of drinks in a week: 0  Interpretation: Low risk drinking behavior  Single Item Drug Screening:  How often have you used an illegal drug (including marijuana) or a prescription medication for non-medical reasons in the past year? never    Single Item Drug Screen Score: 0  Interpretation: Negative screen for possible drug use disorder    Brief Intervention  Alcohol & drug use screenings were reviewed   No concerns regarding substance use disorder identified  Other Counseling Topics:   Calcium and vitamin D intake and regular weightbearing exercise         Ekaterina Giron MD

## 2021-10-15 ENCOUNTER — OFFICE VISIT (OUTPATIENT)
Dept: URGENT CARE | Age: 81
End: 2021-10-15
Payer: MEDICARE

## 2021-10-15 VITALS
TEMPERATURE: 97.8 F | HEART RATE: 76 BPM | OXYGEN SATURATION: 98 % | WEIGHT: 170 LBS | BODY MASS INDEX: 25.76 KG/M2 | HEIGHT: 68 IN | RESPIRATION RATE: 18 BRPM

## 2021-10-15 DIAGNOSIS — J34.89 RHINORRHEA: ICD-10-CM

## 2021-10-15 DIAGNOSIS — J02.9 SORE THROAT: Primary | ICD-10-CM

## 2021-10-15 PROCEDURE — 87070 CULTURE OTHR SPECIMN AEROBIC: CPT | Performed by: STUDENT IN AN ORGANIZED HEALTH CARE EDUCATION/TRAINING PROGRAM

## 2021-10-15 PROCEDURE — G0463 HOSPITAL OUTPT CLINIC VISIT: HCPCS | Performed by: STUDENT IN AN ORGANIZED HEALTH CARE EDUCATION/TRAINING PROGRAM

## 2021-10-15 PROCEDURE — 99213 OFFICE O/P EST LOW 20 MIN: CPT | Performed by: STUDENT IN AN ORGANIZED HEALTH CARE EDUCATION/TRAINING PROGRAM

## 2021-10-15 RX ORDER — LIDOCAINE HYDROCHLORIDE 20 MG/ML
10 SOLUTION OROPHARYNGEAL 3 TIMES DAILY PRN
Qty: 100 ML | Refills: 1 | Status: SHIPPED | OUTPATIENT
Start: 2021-10-15 | End: 2022-02-16 | Stop reason: ALTCHOICE

## 2021-10-15 RX ORDER — AZITHROMYCIN 250 MG/1
TABLET, FILM COATED ORAL
Qty: 6 TABLET | Refills: 0 | Status: SHIPPED | OUTPATIENT
Start: 2021-10-15 | End: 2021-10-19

## 2021-10-17 LAB — BACTERIA THROAT CULT: NORMAL

## 2021-10-19 ENCOUNTER — TELEMEDICINE (OUTPATIENT)
Dept: FAMILY MEDICINE CLINIC | Facility: CLINIC | Age: 81
End: 2021-10-19
Payer: MEDICARE

## 2021-10-19 DIAGNOSIS — J06.9 UPPER RESPIRATORY TRACT INFECTION, UNSPECIFIED TYPE: Primary | ICD-10-CM

## 2021-10-19 PROCEDURE — 99441 PR PHYS/QHP TELEPHONE EVALUATION 5-10 MIN: CPT | Performed by: PHYSICIAN ASSISTANT

## 2021-10-19 RX ORDER — BENZONATATE 200 MG/1
200 CAPSULE ORAL 3 TIMES DAILY PRN
Qty: 20 CAPSULE | Refills: 0 | Status: SHIPPED | OUTPATIENT
Start: 2021-10-19 | End: 2022-02-16 | Stop reason: ALTCHOICE

## 2021-10-19 RX ORDER — CLINDAMYCIN HYDROCHLORIDE 150 MG/1
150 CAPSULE ORAL 3 TIMES DAILY
Qty: 15 CAPSULE | Refills: 0 | Status: SHIPPED | OUTPATIENT
Start: 2021-10-19 | End: 2021-10-24

## 2021-10-28 ENCOUNTER — TELEPHONE (OUTPATIENT)
Dept: FAMILY MEDICINE CLINIC | Facility: CLINIC | Age: 81
End: 2021-10-28

## 2021-12-08 ENCOUNTER — APPOINTMENT (OUTPATIENT)
Dept: LAB | Age: 81
End: 2021-12-08
Payer: MEDICARE

## 2021-12-08 DIAGNOSIS — N39.0 URINARY TRACT INFECTION WITHOUT HEMATURIA, SITE UNSPECIFIED: ICD-10-CM

## 2021-12-08 LAB
ALBUMIN SERPL BCP-MCNC: 3.4 G/DL (ref 3.5–5)
ALP SERPL-CCNC: 59 U/L (ref 46–116)
ALT SERPL W P-5'-P-CCNC: 25 U/L (ref 12–78)
ANION GAP SERPL CALCULATED.3IONS-SCNC: 5 MMOL/L (ref 4–13)
AST SERPL W P-5'-P-CCNC: 16 U/L (ref 5–45)
BASOPHILS # BLD AUTO: 0.04 THOUSANDS/ΜL (ref 0–0.1)
BASOPHILS NFR BLD AUTO: 1 % (ref 0–1)
BILIRUB SERPL-MCNC: 0.54 MG/DL (ref 0.2–1)
BUN SERPL-MCNC: 22 MG/DL (ref 5–25)
CALCIUM ALBUM COR SERPL-MCNC: 9.5 MG/DL (ref 8.3–10.1)
CALCIUM SERPL-MCNC: 9 MG/DL (ref 8.3–10.1)
CHLORIDE SERPL-SCNC: 106 MMOL/L (ref 100–108)
CO2 SERPL-SCNC: 28 MMOL/L (ref 21–32)
CREAT SERPL-MCNC: 0.98 MG/DL (ref 0.6–1.3)
EOSINOPHIL # BLD AUTO: 0.12 THOUSAND/ΜL (ref 0–0.61)
EOSINOPHIL NFR BLD AUTO: 2 % (ref 0–6)
ERYTHROCYTE [DISTWIDTH] IN BLOOD BY AUTOMATED COUNT: 11.9 % (ref 11.6–15.1)
EST. AVERAGE GLUCOSE BLD GHB EST-MCNC: 123 MG/DL
GFR SERPL CREATININE-BSD FRML MDRD: 54 ML/MIN/1.73SQ M
GLUCOSE SERPL-MCNC: 115 MG/DL (ref 65–140)
HBA1C MFR BLD: 5.9 %
HCT VFR BLD AUTO: 39.6 % (ref 34.8–46.1)
HGB BLD-MCNC: 12.9 G/DL (ref 11.5–15.4)
IMM GRANULOCYTES # BLD AUTO: 0.01 THOUSAND/UL (ref 0–0.2)
IMM GRANULOCYTES NFR BLD AUTO: 0 % (ref 0–2)
LYMPHOCYTES # BLD AUTO: 1.33 THOUSANDS/ΜL (ref 0.6–4.47)
LYMPHOCYTES NFR BLD AUTO: 22 % (ref 14–44)
MCH RBC QN AUTO: 30.6 PG (ref 26.8–34.3)
MCHC RBC AUTO-ENTMCNC: 32.6 G/DL (ref 31.4–37.4)
MCV RBC AUTO: 94 FL (ref 82–98)
MONOCYTES # BLD AUTO: 0.77 THOUSAND/ΜL (ref 0.17–1.22)
MONOCYTES NFR BLD AUTO: 13 % (ref 4–12)
NEUTROPHILS # BLD AUTO: 3.67 THOUSANDS/ΜL (ref 1.85–7.62)
NEUTS SEG NFR BLD AUTO: 62 % (ref 43–75)
NRBC BLD AUTO-RTO: 0 /100 WBCS
PLATELET # BLD AUTO: 275 THOUSANDS/UL (ref 149–390)
PMV BLD AUTO: 10.1 FL (ref 8.9–12.7)
POTASSIUM SERPL-SCNC: 3.9 MMOL/L (ref 3.5–5.3)
PROT SERPL-MCNC: 6.5 G/DL (ref 6.4–8.2)
RBC # BLD AUTO: 4.22 MILLION/UL (ref 3.81–5.12)
SODIUM SERPL-SCNC: 139 MMOL/L (ref 136–145)
TSH SERPL DL<=0.05 MIU/L-ACNC: 2.78 UIU/ML (ref 0.36–3.74)
WBC # BLD AUTO: 5.94 THOUSAND/UL (ref 4.31–10.16)

## 2021-12-08 PROCEDURE — 84443 ASSAY THYROID STIM HORMONE: CPT | Performed by: FAMILY MEDICINE

## 2021-12-08 PROCEDURE — 83036 HEMOGLOBIN GLYCOSYLATED A1C: CPT | Performed by: FAMILY MEDICINE

## 2021-12-08 PROCEDURE — 36415 COLL VENOUS BLD VENIPUNCTURE: CPT | Performed by: FAMILY MEDICINE

## 2021-12-08 PROCEDURE — 85025 COMPLETE CBC W/AUTO DIFF WBC: CPT | Performed by: FAMILY MEDICINE

## 2021-12-08 PROCEDURE — 80053 COMPREHEN METABOLIC PANEL: CPT | Performed by: FAMILY MEDICINE

## 2022-01-13 ENCOUNTER — APPOINTMENT (OUTPATIENT)
Dept: LAB | Age: 82
End: 2022-01-13
Payer: MEDICARE

## 2022-01-13 DIAGNOSIS — I51.9 MYXEDEMA HEART DISEASE: ICD-10-CM

## 2022-01-13 DIAGNOSIS — E03.9 MYXEDEMA HEART DISEASE: ICD-10-CM

## 2022-01-13 DIAGNOSIS — I10 ESSENTIAL HYPERTENSION, MALIGNANT: ICD-10-CM

## 2022-01-13 DIAGNOSIS — R73.01 IMPAIRED FASTING GLUCOSE: ICD-10-CM

## 2022-01-13 LAB
ALBUMIN SERPL BCP-MCNC: 3.4 G/DL (ref 3.5–5)
ALP SERPL-CCNC: 57 U/L (ref 46–116)
ALT SERPL W P-5'-P-CCNC: 22 U/L (ref 12–78)
ANION GAP SERPL CALCULATED.3IONS-SCNC: 5 MMOL/L (ref 4–13)
AST SERPL W P-5'-P-CCNC: 13 U/L (ref 5–45)
BASOPHILS # BLD AUTO: 0.04 THOUSANDS/ΜL (ref 0–0.1)
BASOPHILS NFR BLD AUTO: 1 % (ref 0–1)
BILIRUB SERPL-MCNC: 0.54 MG/DL (ref 0.2–1)
BUN SERPL-MCNC: 18 MG/DL (ref 5–25)
CALCIUM ALBUM COR SERPL-MCNC: 9.2 MG/DL (ref 8.3–10.1)
CALCIUM SERPL-MCNC: 8.7 MG/DL (ref 8.3–10.1)
CHLORIDE SERPL-SCNC: 105 MMOL/L (ref 100–108)
CO2 SERPL-SCNC: 28 MMOL/L (ref 21–32)
CREAT SERPL-MCNC: 0.85 MG/DL (ref 0.6–1.3)
EOSINOPHIL # BLD AUTO: 0.11 THOUSAND/ΜL (ref 0–0.61)
EOSINOPHIL NFR BLD AUTO: 3 % (ref 0–6)
ERYTHROCYTE [DISTWIDTH] IN BLOOD BY AUTOMATED COUNT: 11.9 % (ref 11.6–15.1)
EST. AVERAGE GLUCOSE BLD GHB EST-MCNC: 120 MG/DL
GFR SERPL CREATININE-BSD FRML MDRD: 64 ML/MIN/1.73SQ M
GLUCOSE P FAST SERPL-MCNC: 94 MG/DL (ref 65–99)
HBA1C MFR BLD: 5.8 %
HCT VFR BLD AUTO: 41.9 % (ref 34.8–46.1)
HGB BLD-MCNC: 13.1 G/DL (ref 11.5–15.4)
IMM GRANULOCYTES # BLD AUTO: 0.01 THOUSAND/UL (ref 0–0.2)
IMM GRANULOCYTES NFR BLD AUTO: 0 % (ref 0–2)
LYMPHOCYTES # BLD AUTO: 1.39 THOUSANDS/ΜL (ref 0.6–4.47)
LYMPHOCYTES NFR BLD AUTO: 33 % (ref 14–44)
MCH RBC QN AUTO: 30.5 PG (ref 26.8–34.3)
MCHC RBC AUTO-ENTMCNC: 31.3 G/DL (ref 31.4–37.4)
MCV RBC AUTO: 98 FL (ref 82–98)
MONOCYTES # BLD AUTO: 0.54 THOUSAND/ΜL (ref 0.17–1.22)
MONOCYTES NFR BLD AUTO: 13 % (ref 4–12)
NEUTROPHILS # BLD AUTO: 2.15 THOUSANDS/ΜL (ref 1.85–7.62)
NEUTS SEG NFR BLD AUTO: 50 % (ref 43–75)
NRBC BLD AUTO-RTO: 0 /100 WBCS
PLATELET # BLD AUTO: 272 THOUSANDS/UL (ref 149–390)
PMV BLD AUTO: 10.2 FL (ref 8.9–12.7)
POTASSIUM SERPL-SCNC: 3.8 MMOL/L (ref 3.5–5.3)
PROT SERPL-MCNC: 6.6 G/DL (ref 6.4–8.2)
RBC # BLD AUTO: 4.29 MILLION/UL (ref 3.81–5.12)
SODIUM SERPL-SCNC: 138 MMOL/L (ref 136–145)
TSH SERPL DL<=0.05 MIU/L-ACNC: 3.64 UIU/ML (ref 0.36–3.74)
WBC # BLD AUTO: 4.24 THOUSAND/UL (ref 4.31–10.16)

## 2022-01-13 PROCEDURE — 84443 ASSAY THYROID STIM HORMONE: CPT

## 2022-01-13 PROCEDURE — 80053 COMPREHEN METABOLIC PANEL: CPT

## 2022-01-13 PROCEDURE — 83036 HEMOGLOBIN GLYCOSYLATED A1C: CPT

## 2022-01-13 PROCEDURE — 36415 COLL VENOUS BLD VENIPUNCTURE: CPT

## 2022-01-13 PROCEDURE — 85025 COMPLETE CBC W/AUTO DIFF WBC: CPT

## 2022-01-31 ENCOUNTER — OFFICE VISIT (OUTPATIENT)
Dept: FAMILY MEDICINE CLINIC | Facility: CLINIC | Age: 82
End: 2022-01-31
Payer: MEDICARE

## 2022-01-31 VITALS
BODY MASS INDEX: 26.37 KG/M2 | SYSTOLIC BLOOD PRESSURE: 182 MMHG | HEART RATE: 58 BPM | TEMPERATURE: 96 F | DIASTOLIC BLOOD PRESSURE: 90 MMHG | WEIGHT: 174 LBS | HEIGHT: 68 IN | OXYGEN SATURATION: 97 %

## 2022-01-31 DIAGNOSIS — I10 ACCELERATED HYPERTENSION: Primary | ICD-10-CM

## 2022-01-31 DIAGNOSIS — D72.819 LEUKOPENIA, UNSPECIFIED TYPE: ICD-10-CM

## 2022-01-31 DIAGNOSIS — R73.01 IMPAIRED FASTING GLUCOSE: ICD-10-CM

## 2022-01-31 PROCEDURE — 99214 OFFICE O/P EST MOD 30 MIN: CPT | Performed by: FAMILY MEDICINE

## 2022-01-31 RX ORDER — AMLODIPINE BESYLATE 2.5 MG/1
TABLET ORAL
Qty: 270 TABLET | Refills: 3 | Status: SHIPPED | OUTPATIENT
Start: 2022-01-31

## 2022-01-31 NOTE — PROGRESS NOTES
Assessment/Plan:     Diagnoses and all orders for this visit:    Accelerated hypertension  -     amLODIPine (NORVASC) 2 5 mg tablet; 2 tablets in AM  1 tablet in PM     Impaired fasting glucose    Leukopenia, unspecified type          Change Amlodipine to 5 mg AM/2 5 mg PM  Monitor BP at home  Follow up OV next month  call if any problems  Patient ID: Cristal Wei is a 79 y o  female  Followup visit  She has had a number of recent elevated BP readings-asymptomatic  No NSAIDs, decongestants  She is currently on a 3 drug regimen  Amlodipine 2 5 mg BID, Valsartan 320 mg daily and Metoprolol 50 mg BID  Past history of hyponatremia and hypokalemia while on HCTZ  Labs 01/2022  creatinine 0 85  GFR 64  Electrolytes normal  K+ 3 8  Hgb 13 1  12/2020 Lipid profile cholesterol 187  TGs 88  HDL 55    LFTs normal  Impaired fasting glucose  01/2021 FBS 94  A1c 5 8  + FH type DM      Recent Results (from the past 504 hour(s))   Comprehensive metabolic panel    Collection Time: 01/13/22  7:44 AM   Result Value Ref Range    Sodium 138 136 - 145 mmol/L    Potassium 3 8 3 5 - 5 3 mmol/L    Chloride 105 100 - 108 mmol/L    CO2 28 21 - 32 mmol/L    ANION GAP 5 4 - 13 mmol/L    BUN 18 5 - 25 mg/dL    Creatinine 0 85 0 60 - 1 30 mg/dL    Glucose, Fasting 94 65 - 99 mg/dL    Calcium 8 7 8 3 - 10 1 mg/dL    Corrected Calcium 9 2 8 3 - 10 1 mg/dL    AST 13 5 - 45 U/L    ALT 22 12 - 78 U/L    Alkaline Phosphatase 57 46 - 116 U/L    Total Protein 6 6 6 4 - 8 2 g/dL    Albumin 3 4 (L) 3 5 - 5 0 g/dL    Total Bilirubin 0 54 0 20 - 1 00 mg/dL    eGFR 64 ml/min/1 73sq m   CBC and differential    Collection Time: 01/13/22  7:44 AM   Result Value Ref Range    WBC 4 24 (L) 4 31 - 10 16 Thousand/uL    RBC 4 29 3 81 - 5 12 Million/uL    Hemoglobin 13 1 11 5 - 15 4 g/dL    Hematocrit 41 9 34 8 - 46 1 %    MCV 98 82 - 98 fL    MCH 30 5 26 8 - 34 3 pg    MCHC 31 3 (L) 31 4 - 37 4 g/dL    RDW 11 9 11 6 - 15 1 %    MPV 10 2 8 9 - 12 7 fL Platelets 453 056 - 446 Thousands/uL    nRBC 0 /100 WBCs    Neutrophils Relative 50 43 - 75 %    Immat GRANS % 0 0 - 2 %    Lymphocytes Relative 33 14 - 44 %    Monocytes Relative 13 (H) 4 - 12 %    Eosinophils Relative 3 0 - 6 %    Basophils Relative 1 0 - 1 %    Neutrophils Absolute 2 15 1 85 - 7 62 Thousands/µL    Immature Grans Absolute 0 01 0 00 - 0 20 Thousand/uL    Lymphocytes Absolute 1 39 0 60 - 4 47 Thousands/µL    Monocytes Absolute 0 54 0 17 - 1 22 Thousand/µL    Eosinophils Absolute 0 11 0 00 - 0 61 Thousand/µL    Basophils Absolute 0 04 0 00 - 0 10 Thousands/µL   TSH, 3rd generation with Free T4 reflex    Collection Time: 01/13/22  7:44 AM   Result Value Ref Range    TSH 3RD GENERATON 3 640 0 358 - 3 740 uIU/mL   Hemoglobin A1C    Collection Time: 01/13/22  7:44 AM   Result Value Ref Range    Hemoglobin A1C 5 8 (H) Normal 3 8-5 6%; PreDiabetic 5 7-6 4%; Diabetic >=6 5%; Glycemic control for adults with diabetes <7 0% %     mg/dl                  The following portions of the patient's history were reviewed and updated as appropriate: allergies, current medications, past family history, past medical history, past social history, past surgical history and problem list     Review of Systems   Constitutional: Positive for unexpected weight change (4 lb weight gain from 10/2021)  Negative for appetite change, chills, fatigue and fever  HENT: Negative for congestion, ear pain, hearing loss, rhinorrhea, sore throat and trouble swallowing  Chronic nasal congestion improved with Flonase  Eyes: Negative for visual disturbance  Cataract surgery OU    Respiratory: Negative for cough, shortness of breath and wheezing  Cardiovascular: Negative for chest pain, palpitations and leg swelling  01/2020 admission for substernal chest pain  Patient was initialy seen at urgent care  EKG NSR with frequent PVCs  she was sent to [facility]  Initial EKG NSR with fusion complexes   No acute changes  2nd EKG sinus bradycardia with PACs  troponins x 3 negative  Chest x-ray no active disease  Stress echocardiogram normal   EF 60%  Gastrointestinal: Negative for abdominal pain, blood in stool, constipation, diarrhea, nausea and vomiting  Chronic constipation  Colonoscopy 03/2019 normal except for diverticulosis  + FH colon CA sister  Endocrine: Negative for polydipsia and polyuria  History of subclinical hypothyroidism  TSH 07/2017  thyroid ABs negative  02/2015 DEXA scan normal     Lab Results       Component                Value               Date                       [id number]             3 640               01/13/2022                           Genitourinary: Negative for difficulty urinating, dysuria and hematuria  History of recurrent UTIs followed by Urology  On Hiprex  02/2019 kidney bladder ultrasound normal minimal PVR  29 ML  Simple cyst left adnexa without significant change  pelvic u/s 08/2016 stable simple left ovarian cyst  01/2017  normal at 9 5 followed by GYN ,    Musculoskeletal: Negative for arthralgias and myalgias  OA right knee s/p steroid injection by ortho with symptom relief  X rays right knee 06/2020  Reviewed  Prior MRI right knee showed  meniscal tear  Skin: Negative for rash  Allergic/Immunologic: Positive for environmental allergies  Neurological: Negative for dizziness and headaches  Hematological: Negative for adenopathy  Does not bruise/bleed easily  history of mild leukopenia  06/2018  Vitamin B12 589  Folate greater than 20       Lab Results       Component                Value               Date                       WBC                      4 24 (L)            01/13/2022                 HGB                      13 1                01/13/2022                 HCT                      41 9                01/13/2022                 MCV                      98                  01/13/2022 PLT                      272                 01/13/2022                         WBC       Date                     Value               Ref Range           Status                06/29/2021               5 45                4 31 - 10 16 T*     Final                 12/14/2020               4 47                4 31 - 10 16 T*     Final                 05/27/2020               5 07                4 31 - 10 16 T*     Final                 06/11/2015               4 75                4 31 - 10 16 T*     Final                 04/02/2014               5 24                4 31 - 10 16 T*     Final                          Psychiatric/Behavioral: Negative for dysphoric mood and sleep disturbance  The patient is nervous/anxious  Excessive worrying " I have always been that way"          Objective:      BP (!) 182/90 (BP Location: Left arm, Patient Position: Sitting, Cuff Size: Standard)   Pulse 58   Temp (!) 96 °F (35 6 °C)   Ht 5' 7 5" (1 715 m)   Wt 78 9 kg (174 lb)   SpO2 97%   BMI 26 85 kg/m²     BP Readings from Last 3 Encounters:   01/31/22 (!) 182/90   07/14/21 132/84   01/07/21 132/76     Wt Readings from Last 3 Encounters:   01/31/22 78 9 kg (174 lb)   10/15/21 77 1 kg (170 lb)   07/14/21 78 kg (172 lb)        Physical Exam  Vitals and nursing note reviewed  Constitutional:       General: She is not in acute distress  Appearance: She is well-developed  HENT:      Right Ear: Tympanic membrane and ear canal normal       Left Ear: Tympanic membrane and ear canal normal    Eyes:      General: No scleral icterus  Extraocular Movements: Extraocular movements intact  Conjunctiva/sclera: Conjunctivae normal    Neck:      Thyroid: No thyroid mass or thyromegaly  Vascular: No carotid bruit or JVD  Trachea: No tracheal deviation  Cardiovascular:      Rate and Rhythm: Normal rate and regular rhythm  Heart sounds: Normal heart sounds  No murmur heard  No gallop      Pulmonary: Effort: Pulmonary effort is normal  No respiratory distress  Breath sounds: Normal breath sounds  No wheezing or rales  Chest:   Breasts:      Right: No supraclavicular adenopathy  Left: No supraclavicular adenopathy  Abdominal:      General: Bowel sounds are normal  There is no distension or abdominal bruit  Palpations: Abdomen is soft  There is no hepatomegaly, splenomegaly or mass  Tenderness: There is no abdominal tenderness  There is no guarding or rebound  Musculoskeletal:      Right lower leg: No edema  Left lower leg: No edema  Lymphadenopathy:      Cervical: No cervical adenopathy  Upper Body:      Right upper body: No supraclavicular adenopathy  Left upper body: No supraclavicular adenopathy  Skin:     Findings: No rash  Nails: There is no clubbing  Neurological:      General: No focal deficit present  Mental Status: She is alert and oriented to person, place, and time     Psychiatric:         Mood and Affect: Mood normal          Behavior: Behavior normal

## 2022-02-01 ENCOUNTER — TELEPHONE (OUTPATIENT)
Dept: FAMILY MEDICINE CLINIC | Facility: CLINIC | Age: 82
End: 2022-02-01

## 2022-02-01 NOTE — TELEPHONE ENCOUNTER
I just increased her Amlodipine yesterday to 5 mg AM and 2 5 mg PM  She is also on max dose Irbesartan and Metoprolol 50 mg BID  I do not want to lower her BP too quickly  I told her if BP is not improving dose of Amlodipine can be increased to BID   I would wait a few days before making another change       Current Outpatient Medications:     amLODIPine (NORVASC) 2 5 mg tablet, 2 tablets in AM  1 tablet in PM , Disp: 270 tablet, Rfl: 3    benzonatate (TESSALON) 200 MG capsule, Take 1 capsule (200 mg total) by mouth 3 (three) times a day as needed for cough (Patient not taking: Reported on 1/31/2022 ), Disp: 20 capsule, Rfl: 0    Calcium Carb-Cholecalciferol (CALCIUM 1000 + D) 1000-800 MG-UNIT TABS, Take by mouth, Disp: , Rfl:     Cholecalciferol 2000 units CAPS, Take 1 capsule by mouth, Disp: , Rfl:     irbesartan (AVAPRO) 300 mg tablet, Take 1 tablet (300 mg total) by mouth daily at bedtime, Disp: 90 tablet, Rfl: 3    Lidocaine Viscous HCl (XYLOCAINE) 2 % mucosal solution, Swish and spit 10 mL 3 (three) times a day as needed for mouth pain or discomfort, Disp: 100 mL, Rfl: 1    methenamine hippurate (HIPREX) 1 g tablet, Take 1 g by mouth, Disp: , Rfl:     metoprolol tartrate (LOPRESSOR) 50 mg tablet, Take 1 tablet (50 mg total) by mouth 2 (two) times a day, Disp: 180 tablet, Rfl: 3    multivitamin (THERAGRAN) TABS, Take 1 tablet by mouth, Disp: , Rfl:

## 2022-02-01 NOTE — TELEPHONE ENCOUNTER
Patient called upset that her BP reading was 199/97 this morning after increasing Amlodipine yesterday  Please advise

## 2022-02-16 ENCOUNTER — OFFICE VISIT (OUTPATIENT)
Dept: FAMILY MEDICINE CLINIC | Facility: CLINIC | Age: 82
End: 2022-02-16
Payer: MEDICARE

## 2022-02-16 VITALS
DIASTOLIC BLOOD PRESSURE: 62 MMHG | BODY MASS INDEX: 26.83 KG/M2 | SYSTOLIC BLOOD PRESSURE: 144 MMHG | RESPIRATION RATE: 16 BRPM | TEMPERATURE: 96.7 F | HEART RATE: 60 BPM | HEIGHT: 68 IN | WEIGHT: 177 LBS

## 2022-02-16 DIAGNOSIS — F41.1 GAD (GENERALIZED ANXIETY DISORDER): ICD-10-CM

## 2022-02-16 DIAGNOSIS — Z87.440 HISTORY OF RECURRENT UTIS: ICD-10-CM

## 2022-02-16 DIAGNOSIS — I10 PRIMARY HYPERTENSION: Primary | ICD-10-CM

## 2022-02-16 PROCEDURE — 99213 OFFICE O/P EST LOW 20 MIN: CPT | Performed by: FAMILY MEDICINE

## 2022-02-16 NOTE — PROGRESS NOTES
Assessment/Plan:         Diagnoses and all orders for this visit:    Primary hypertension    JEREMY (generalized anxiety disorder)    History of recurrent UTIs          Continue with current medications  Monitor BP at home  Dose of Amlodipine can be titrated to 5 mg twice a day  If BP still elevated consider renal artery Doppler evaluate for renal artery stenosis  I discussed medication for generalized anxiety-possibly contributing to elevated BP this was deferred for now  Regarding recurrent UTI patient may seek 2nd opinion  Call if any changes      Patient ID: Dylan Santana is a 79 y o  female  Followup visit  She was seen last month with accelerated hypertension-on 3 drug BP regimen  Her dose of Amlodipine was increased to 5 mg AM and 2 5 mg PM  She remains on Valsartan 320 mg daily and Metoprolol 50 mg BID  Past history of hyponatremia and hypokalemia while on HCTZ  No NSAIDs, decongestants  For several days she was not feeling well and suspected a UTI  OTC urine test positive  She has been Cipro for several days and is feeling better  Last 4 home BP readings 164/79  152/83  141/71 and today 134/67  Labs 01/2022  creatinine 0 85  GFR 64  Electrolytes normal  K+ 3 8  Hgb 13 1  12/2020 Lipid profile cholesterol 187  TGs 88  HDL 55    LFTs normal  Impaired fasting glucose  01/2021 FBS 94  A1c 5 8  + FH type DM  The following portions of the patient's history were reviewed and updated as appropriate: allergies, current medications, past family history, past medical history, past social history, past surgical history and problem list     Review of Systems   Constitutional: Negative for appetite change, chills, fatigue, fever and unexpected weight change  HENT: Negative for congestion, ear pain, hearing loss, rhinorrhea, sore throat and trouble swallowing  Chronic nasal congestion improved with Flonase  Eyes: Negative for visual disturbance          Cataract surgery OU    Respiratory: Negative for cough, shortness of breath and wheezing  Cardiovascular: Negative for chest pain, palpitations and leg swelling  01/2020 admission for substernal chest pain  Patient was initialy seen at urgent care  EKG NSR with frequent PVCs  she was sent to [facility]  Initial EKG NSR with fusion complexes  No acute changes  2nd EKG sinus bradycardia with PACs  troponins x 3 negative  Chest x-ray no active disease  Stress echocardiogram normal   EF 60%  Gastrointestinal: Negative for abdominal pain, blood in stool, constipation, diarrhea, nausea and vomiting  Chronic constipation  Colonoscopy 03/2019 normal except for diverticulosis  + FH colon CA sister  Endocrine: Negative for polydipsia and polyuria  History of subclinical hypothyroidism  TSH 07/2017  thyroid ABs negative  02/2015 DEXA scan normal     Lab Results       Component                Value               Date                       [id number]             3 640               01/13/2022                           Genitourinary: Negative for difficulty urinating, dysuria and hematuria  History of recurrent UTIs followed by Urology  On Hiprex  02/2019 kidney bladder ultrasound normal minimal PVR  29 ML  Simple cyst left adnexa without significant change  pelvic u/s 08/2016 stable simple left ovarian cyst  01/2017  normal at 9 5 followed by GYN ,    Musculoskeletal: Negative for arthralgias and myalgias  OA right knee s/p steroid injection by ortho with symptom relief  X rays right knee 06/2020  Reviewed  Prior MRI right knee showed  meniscal tear  Skin: Negative for rash  Allergic/Immunologic: Positive for environmental allergies  Neurological: Negative for dizziness and headaches  Hematological: Negative for adenopathy  Does not bruise/bleed easily  history of mild leukopenia  06/2018  Vitamin B12 589  Folate greater than 20       Lab Results       Component                Value Date                       WBC                      4 24 (L)            01/13/2022                 HGB                      13 1                01/13/2022                 HCT                      41 9                01/13/2022                 MCV                      98                  01/13/2022                 PLT                      272                 01/13/2022                         WBC       Date                     Value               Ref Range           Status                06/29/2021               5 45                4 31 - 10 16 T*     Final                 12/14/2020               4 47                4 31 - 10 16 T*     Final                 05/27/2020               5 07                4 31 - 10 16 T*     Final                 06/11/2015               4 75                4 31 - 10 16 T*     Final                 04/02/2014               5 24                4 31 - 10 16 T*     Final                          Psychiatric/Behavioral: Negative for dysphoric mood and sleep disturbance  The patient is nervous/anxious  Excessive worrying " I have always been that way"          Objective:      /62   Pulse 60   Temp (!) 96 7 °F (35 9 °C)   Resp 16   Ht 5' 7 5" (1 715 m)   Wt 80 3 kg (177 lb)   BMI 27 31 kg/m²     BP Readings from Last 3 Encounters:   02/16/22 144/62   01/31/22 (!) 182/90   07/14/21 132/84     Wt Readings from Last 3 Encounters:   02/16/22 80 3 kg (177 lb)   01/31/22 78 9 kg (174 lb)   10/15/21 77 1 kg (170 lb)        Physical Exam  Constitutional:       General: She is not in acute distress  Neurological:      Mental Status: She is alert  Psychiatric:         Mood and Affect: Mood is anxious  Behavior: Behavior normal          Thought Content:  Thought content normal          Judgment: Judgment normal

## 2022-04-25 ENCOUNTER — OFFICE VISIT (OUTPATIENT)
Dept: FAMILY MEDICINE CLINIC | Facility: CLINIC | Age: 82
End: 2022-04-25
Payer: MEDICARE

## 2022-04-25 ENCOUNTER — NURSE TRIAGE (OUTPATIENT)
Dept: OTHER | Facility: OTHER | Age: 82
End: 2022-04-25

## 2022-04-25 VITALS
SYSTOLIC BLOOD PRESSURE: 136 MMHG | HEIGHT: 68 IN | RESPIRATION RATE: 16 BRPM | WEIGHT: 170 LBS | HEART RATE: 58 BPM | BODY MASS INDEX: 25.76 KG/M2 | DIASTOLIC BLOOD PRESSURE: 70 MMHG | TEMPERATURE: 96.6 F

## 2022-04-25 DIAGNOSIS — K59.09 OTHER CONSTIPATION: Primary | ICD-10-CM

## 2022-04-25 DIAGNOSIS — R10.30 LOWER ABDOMINAL PAIN: ICD-10-CM

## 2022-04-25 PROCEDURE — 99213 OFFICE O/P EST LOW 20 MIN: CPT | Performed by: FAMILY MEDICINE

## 2022-04-25 RX ORDER — KETOCONAZOLE 20 MG/ML
1 SHAMPOO TOPICAL 2 TIMES WEEKLY
COMMUNITY
Start: 2022-03-11

## 2022-04-25 NOTE — TELEPHONE ENCOUNTER
Patient is scheduled for an appointment this morning  Reason for Disposition   [1] MODERATE pain (e g , interferes with normal activities) AND [2] pain comes and goes (cramps) AND [3] present > 24 hours  (Exception: pain with Vomiting or Diarrhea - see that Guideline)    Answer Assessment - Initial Assessment Questions  1  LOCATION: "Where does it hurt?"       Lower abdomen   2  RADIATION: "Does the pain shoot anywhere else?" (e g , chest, back)      Denies   3  ONSET: "When did the pain begin?" (e g , minutes, hours or days ago)       "It actually started a couple weeks ago when I was eating nuts "   4  SUDDEN: "Gradual or sudden onset?"      "I think it was sort of sudden "  5  PATTERN "Does the pain come and go, or is it constant?"     - If constant: "Is it getting better, staying the same, or worsening?"       (Note: Constant means the pain never goes away completely; most serious pain is constant and it progresses)      - If intermittent: "How long does it last?" "Do you have pain now?"      (Note: Intermittent means the pain goes away completely between bouts)     Intermittent   6  SEVERITY: "How bad is the pain?"  (e g , Scale 1-10; mild, moderate, or severe)    - MILD (1-3): doesn't interfere with normal activities, abdomen soft and not tender to touch     - MODERATE (4-7): interferes with normal activities or awakens from sleep, tender to touch     - SEVERE (8-10): excruciating pain, doubled over, unable to do any normal activities       Mild 3/10 now, fluctuates throughout the day  "Right now it is not too back but in the night it was like an 8  All of a sudden it seems like it flares up "   7  RECURRENT SYMPTOM: "Have you ever had this type of stomach pain before?" If Yes, ask: "When was the last time?" and "What happened that time?"       "I've had this before  I would say maybe 9 months ago   Before that I had it on and off for a couple years "   8  CAUSE: "What do you think is causing the stomach pain?"     Diverticulitis "I went on a nut eating spree a few weeks ago  I haven't eaten them in in a couple weeks "   9  RELIEVING/AGGRAVATING FACTORS: "What makes it better or worse?" (e g , movement, antacids, bowel movement)      Worse - sitting down or laying in bed   10   OTHER SYMPTOMS: "Has there been any vomiting, diarrhea, constipation, or urine problems?"        Constipation - last bowel movement 2-3 days ago with straining and was hard    Protocols used: ABDOMINAL PAIN - [facility]

## 2022-04-25 NOTE — PROGRESS NOTES
FAMILY MEDICINE PROGRESS NOTE    Date of Service: 22  Primary Care Provider:   Jessica Cooper MD       Name: Pepito Melendez       : 1940       Age:82 y o  Sex: female      MRN: [id number]      Chief Complaint:Abdominal Pain (lower abdomen)       ASSESSMENT and PLAN:  Pepito Melendez is a 79 y o  female with:     Problem List Items Addressed This Visit     None      Visit Diagnoses     Other constipation    -  Primary    Lower abdominal pain            Exam is overall reassuring, no tenderness, rebounding, or guarding  Pain likely due to constipation, possibly due to stress  Recommended treating constipation with Miralax and Senna, increase, hydration  retur in if symptoms worsen or do not improve  SUBJECTIVE:  Pepito Melendez is a 79 y o  female who presents today with a chief complaint of Abdominal Pain (lower abdomen)  HPI     Patient reports that she has had lower abdominal pain for 5 weeks, the pain did subside, though the pain worsened about a week and a half ago  She made the appointment today because the pain is severe  She describes the pain "like someone punched her " She has only moved her bowels about twice in the past week and she has had to strain, she is constipated  She likely is not drinking enough fluid  She denies fevers or chills  She does reports that she is gassy  Last colonoscopy in 2019 with mild diverticulosis  Review of Systems   Constitutional: Negative for appetite change, chills and fever  Gastrointestinal: Positive for abdominal pain and constipation  Negative for anal bleeding, blood in stool, diarrhea, nausea and vomiting  Genitourinary: Negative for dysuria, frequency and urgency  I have reviewed the patient's Past Medical History      Current Outpatient Medications:     amLODIPine (NORVASC) 2 5 mg tablet, 2 tablets in AM  1 tablet in PM , Disp: 270 tablet, Rfl: 3    Calcium Carb-Cholecalciferol (CALCIUM 1000 + D) 1000-800 MG-UNIT TABS, Take by mouth, Disp: , Rfl:     Cholecalciferol 2000 units CAPS, Take 1 capsule by mouth, Disp: , Rfl:     Cranberry 12735 MG CAPS, Take 1 capsule by mouth in the morning, Disp: , Rfl:     irbesartan (AVAPRO) 300 mg tablet, Take 1 tablet (300 mg total) by mouth daily at bedtime, Disp: 90 tablet, Rfl: 3    ketoconazole (NIZORAL) 2 % shampoo, Apply 1 application topically 2 (two) times a week, Disp: , Rfl:     methenamine hippurate (HIPREX) 1 g tablet, Take 1 g by mouth, Disp: , Rfl:     metoprolol tartrate (LOPRESSOR) 50 mg tablet, Take 1 tablet (50 mg total) by mouth 2 (two) times a day, Disp: 180 tablet, Rfl: 3    multivitamin (THERAGRAN) TABS, Take 1 tablet by mouth, Disp: , Rfl:     OBJECTIVE:  /70   Pulse 58   Temp (!) 96 6 °F (35 9 °C)   Resp 16   Ht 5' 7 5" (1 715 m)   Wt 77 1 kg (170 lb)   BMI 26 23 kg/m²    BP Readings from Last 3 Encounters:   04/25/22 136/70   02/16/22 144/62   01/31/22 (!) 182/90      Wt Readings from Last 3 Encounters:   04/25/22 77 1 kg (170 lb)   02/16/22 80 3 kg (177 lb)   01/31/22 78 9 kg (174 lb)      Physical Exam  Constitutional:       General: She is not in acute distress  Appearance: Normal appearance  She is normal weight  She is not ill-appearing or toxic-appearing  HENT:      Head: Normocephalic and atraumatic  Right Ear: External ear normal       Left Ear: External ear normal       Nose: Nose normal       Mouth/Throat:      Mouth: Mucous membranes are moist    Eyes:      Extraocular Movements: Extraocular movements intact  Conjunctiva/sclera: Conjunctivae normal    Cardiovascular:      Rate and Rhythm: Normal rate and regular rhythm  Heart sounds: Normal heart sounds  No murmur heard  No friction rub  No gallop  Pulmonary:      Effort: Pulmonary effort is normal  No respiratory distress  Breath sounds: Normal breath sounds  Abdominal:      General: Abdomen is flat  Bowel sounds are normal  There is no distension  Palpations: Abdomen is soft  Tenderness: There is no abdominal tenderness  There is no guarding or rebound  Musculoskeletal:         General: Normal range of motion  Cervical back: Normal range of motion and neck supple  Skin:     Findings: No erythema or rash  Neurological:      General: No focal deficit present  Mental Status: She is alert and oriented to person, place, and time  Psychiatric:         Mood and Affect: Mood normal          Behavior: Behavior normal                 Return if symptoms worsen or fail to improve  Carlee Hurd MD    Note: Portions of the record have been created with voice recognition software  Occasional wrong word or "sound a like" substitutions may have occurred due to the inherent limitations of voice recognition software  Read the chart carefully and recognize, using context, where substitutions have occurred

## 2022-04-25 NOTE — TELEPHONE ENCOUNTER
Regarding: Severe abd pain  ----- Message from Farhana Ahuja sent at 4/25/2022  8:07 AM EDT -----  "I'm having a lot of pain in my lower abdomen   I'm not sure if it's my diverticulitis "

## 2022-04-25 NOTE — PATIENT INSTRUCTIONS
· Take two capfuls of Miralax daily (AM & PM) as well as Senna until you have a bowel movement  · Increase hydration  · Try lower fiber, mostly liquid diet for a few days      Constipation   AMBULATORY CARE:   Constipation  means you have hard, dry bowel movements, or you go longer than usual between bowel movements  Constipation may be caused by a lack of water or high-fiber foods  Certain medicines, or a lack of fiber or physical activity may also cause constipation  Common symptoms include the following:   · Trouble pushing out your bowel movement    · Pain or bleeding during your bowel movement    · A feeling that you did not finish having your bowel movement    · Nausea    · Bloating    · Headache    Call your doctor if:   · You have blood in your bowel movements  · You have a fever and abdominal pain with the constipation  · Your constipation gets worse  · You start to vomit  · You have questions or concerns about your condition or care  Relieve constipation:  Medicine can keep you have a bowel movement more easily  Medicines may increase moisture in your bowel movement or increase the motion of your intestines  · A suppository  may be used to help soften your bowel movements  This may make them easier to pass  A suppository is guided into your rectum through your anus  · Laxatives  can help stimulate your bowels to have a bowel movement  Your provider may recommend you only use laxatives for a short time  Long-term use may make your bowels dependent on the medicine  · An enema  is liquid medicine used to clear bowel movement from your rectum  The medicine is put into your rectum through your anus  Prevent constipation:   · Drink liquids as directed  You may need to drink extra liquids to help soften and move your bowels  Ask how much liquid to drink each day and which liquids are best for you  · Eat high-fiber foods    This may help decrease constipation by adding bulk to your bowel movements  High-fiber foods include fruit, vegetables, whole-grain breads and cereals, and beans  Your healthcare provider or dietitian can help you create a high-fiber meal plan  Your provider may also recommend a fiber supplement if you cannot get enough fiber from food  · Exercise regularly  Regular physical activity can help stimulate your intestines  Walking is a good exercise to prevent or relieve constipation  Ask which exercises are best for you  · Schedule a time each day to have a bowel movement  This may help train your body to have regular bowel movements  Bend forward while you are on the toilet to help move the bowel movement out  Sit on the toilet for at least 10 minutes, even if you do not have a bowel movement  · Talk to your healthcare provider about your medicines  Certain medicines, such as opioids, can cause constipation  Your provider may be able to make medicine changes  For example, he or she may change the kind of medicine, or change when you take it  Follow up with your doctor as directed:  Write down your questions so you remember to ask them during your visits  © Copyright [employer] 2022 Information is for End User's use only and may not be sold, redistributed or otherwise used for commercial purposes  All illustrations and images included in CareNotes® are the copyrighted property of [employer]  or [facility] Lyudmila Mayorga   The above information is an  only  It is not intended as medical advice for individual conditions or treatments  Talk to your doctor, nurse or pharmacist before following any medical regimen to see if it is safe and effective for you

## 2022-05-19 ENCOUNTER — OFFICE VISIT (OUTPATIENT)
Dept: FAMILY MEDICINE CLINIC | Facility: CLINIC | Age: 82
End: 2022-05-19
Payer: MEDICARE

## 2022-05-19 VITALS
WEIGHT: 170 LBS | DIASTOLIC BLOOD PRESSURE: 80 MMHG | TEMPERATURE: 97.8 F | BODY MASS INDEX: 25.76 KG/M2 | HEART RATE: 58 BPM | OXYGEN SATURATION: 97 % | HEIGHT: 68 IN | SYSTOLIC BLOOD PRESSURE: 138 MMHG

## 2022-05-19 DIAGNOSIS — K59.00 CONSTIPATION, UNSPECIFIED CONSTIPATION TYPE: Primary | ICD-10-CM

## 2022-05-19 DIAGNOSIS — Z87.440 HISTORY OF RECURRENT UTIS: ICD-10-CM

## 2022-05-19 LAB
SL AMB  POCT GLUCOSE, UA: NORMAL
SL AMB LEUKOCYTE ESTERASE,UA: NORMAL
SL AMB POCT BILIRUBIN,UA: NORMAL
SL AMB POCT BLOOD,UA: NORMAL
SL AMB POCT CLARITY,UA: CLEAR
SL AMB POCT COLOR,UA: YELLOW
SL AMB POCT KETONES,UA: NORMAL
SL AMB POCT NITRITE,UA: NORMAL
SL AMB POCT PH,UA: 7.5
SL AMB POCT SPECIFIC GRAVITY,UA: 1.01
SL AMB POCT URINE PROTEIN: NORMAL
SL AMB POCT UROBILINOGEN: 0.2

## 2022-05-19 PROCEDURE — 99214 OFFICE O/P EST MOD 30 MIN: CPT | Performed by: NURSE PRACTITIONER

## 2022-05-19 PROCEDURE — 81002 URINALYSIS NONAUTO W/O SCOPE: CPT | Performed by: NURSE PRACTITIONER

## 2022-05-19 RX ORDER — CIPROFLOXACIN 500 MG/1
500 TABLET, FILM COATED ORAL 2 TIMES DAILY
COMMUNITY
Start: 2022-05-02

## 2022-05-19 NOTE — PROGRESS NOTES
Assessment/Plan:     Diagnoses and all orders for this visit:    Constipation, unspecified constipation type    History of recurrent UTIs  -     POCT urine dip    Other orders  -     ciprofloxacin (CIPRO) 500 mg tablet; Take 500 mg by mouth in the morning and 500 mg in the evening  (Patient not taking: Reported on 5/19/2022)      Discussed with patient plan to treat her chronic constipation with 1/2 bottle of magnesium citrate and than report back tot he office how her abdominal pain feels  Point of care testing performed due to frequent UTI - negative findings  Patient instructed to call if no improvement in 72 hours or symptoms worsen    Subjective:      Patient ID: Roddy Stubbs is a 79 y o  female  79 y  o female presenting with continuation of lower abdominal pain for the past 8 weeks  She describes the pain has "like someone punched" her  She was seen in the office on 04/25/2022 and was instructed to increase her hydration and Miralax  She reports that she took the Miralax for one week and she did not have a regular bowel movement  She states that she has a history of diverticulosis and frequent urinary tract infections so concerned that her abdominal pain is related to one of these conditions  Last colonoscopy in March 2019 with mild diverticulosis        Family History   Problem Relation Age of Onset    Heart disease Father         cardiac disorder    Skin cancer Father     Colon cancer Sister 80    Heart disease Family     Hypertension Family     Coronary artery disease Family     No Known Problems Daughter     No Known Problems Sister     No Known Problems Sister     No Known Problems Daughter     No Known Problems Daughter     No Known Problems Maternal Aunt     No Known Problems Maternal Aunt     No Known Problems Maternal Aunt     No Known Problems Paternal Aunt     No Known Problems Paternal Aunt     No Known Problems Paternal Aunt     No Known Problems Mother     No Known Problems Maternal Grandmother     No Known Problems Maternal Grandfather     No Known Problems Paternal Grandmother     No Known Problems Paternal Grandfather      Social History     Socioeconomic History    Marital status:       Spouse name: Not on file    Number of children: Not on file    Years of education: Not on file    Highest education level: Not on file   Occupational History    Not on file   Tobacco Use    Smoking status: Never Smoker    Smokeless tobacco: Never Used   Substance and Sexual Activity    Alcohol use: Yes     Comment: rare    Drug use: No    Sexual activity: Not on file   Other Topics Concern    Not on file   Social History Narrative    Denied history of home environment domestic violence     Social Determinants of Health     Financial Resource Strain: Not on file   Food Insecurity: Not on file   Transportation Needs: Not on file   Physical Activity: Not on file   Stress: Not on file   Social Connections: Not on file   Intimate Partner Violence: Not on file   Housing Stability: Not on file     E-Cigarette/Vaping     E-Cigarette/Vaping Substances     Past Medical History:   Diagnosis Date    Accelerated essential hypertension     last assessed 08/17/2015    Depression with anxiety     last assesed 08/16/2012    Neutropenia ([facility] [address] ) 1/8/2020    Post-menopausal bleeding 12/5/2013    Strep pharyngitis 8/18/2019     Past Surgical History:   Procedure Laterality Date    BLEPHAROPLASTY      upper lid w/excessive skin    CARDIOVASCULAR STRESS TEST      onset June 2005    COLONOSCOPY      DILATION AND CURETTAGE, DIAGNOSTIC / THERAPEUTIC      ENDOMETRIAL BIOPSY      negative for dysplasia, hyperplasia and malignancy, resolved 03/25/2013    TUBAL LIGATION       Allergies   Allergen Reactions    Penicillins Rash       Current Outpatient Medications:     amLODIPine (NORVASC) 2 5 mg tablet, 2 tablets in AM  1 tablet in PM , Disp: 270 tablet, Rfl: 3    Cranberry 88396 MG CAPS, Take 1 capsule by mouth in the morning, Disp: , Rfl:     irbesartan (AVAPRO) 300 mg tablet, Take 1 tablet (300 mg total) by mouth daily at bedtime, Disp: 90 tablet, Rfl: 3    methenamine hippurate (HIPREX) 1 g tablet, Take 1 g by mouth, Disp: , Rfl:     metoprolol tartrate (LOPRESSOR) 50 mg tablet, Take 1 tablet (50 mg total) by mouth 2 (two) times a day, Disp: 180 tablet, Rfl: 3    Calcium Carb-Cholecalciferol (CALCIUM 1000 + D) 1000-800 MG-UNIT TABS, Take by mouth, Disp: , Rfl:     Cholecalciferol 2000 units CAPS, Take 1 capsule by mouth, Disp: , Rfl:     ciprofloxacin (CIPRO) 500 mg tablet, Take 500 mg by mouth in the morning and 500 mg in the evening  (Patient not taking: Reported on 5/19/2022), Disp: , Rfl:     ketoconazole (NIZORAL) 2 % shampoo, Apply 1 application topically 2 (two) times a week, Disp: , Rfl:     multivitamin (THERAGRAN) TABS, Take 1 tablet by mouth, Disp: , Rfl:     Review of Systems   Constitutional: Negative  Respiratory: Negative  Cardiovascular: Negative  Gastrointestinal: Positive for abdominal pain and constipation  Genitourinary: Negative  Musculoskeletal: Negative  Neurological: Negative  Psychiatric/Behavioral: Negative  Objective:    /80 (BP Location: Left arm, Patient Position: Sitting, Cuff Size: Standard)   Pulse 58   Temp 97 8 °F (36 6 °C)   Ht 5' 7 5" (1 715 m)   Wt 77 1 kg (170 lb)   SpO2 97%   BMI 26 23 kg/m² (Reviewed)     Physical Exam  Vitals reviewed  Constitutional:       General: She is not in acute distress  Appearance: She is well-developed and well-groomed  She is not ill-appearing  HENT:      Head: Normocephalic and atraumatic  Right Ear: External ear normal       Left Ear: External ear normal       Nose: Nose normal       Mouth/Throat:      Mouth: Mucous membranes are moist    Eyes:      General: Lids are normal       Extraocular Movements: Extraocular movements intact        Conjunctiva/sclera: Conjunctivae normal       Pupils: Pupils are equal, round, and reactive to light  Neck:      Thyroid: No thyromegaly or thyroid tenderness  Trachea: Trachea and phonation normal    Cardiovascular:      Rate and Rhythm: Normal rate and regular rhythm  Heart sounds: Normal heart sounds  Pulmonary:      Effort: Pulmonary effort is normal       Breath sounds: Normal breath sounds  Abdominal:      General: Abdomen is flat  Bowel sounds are normal  There is no distension  Palpations: Abdomen is soft  Tenderness: There is no abdominal tenderness  There is no guarding or rebound  Skin:     General: Skin is warm and dry  Capillary Refill: Capillary refill takes less than 2 seconds  Neurological:      General: No focal deficit present  Mental Status: She is alert and oriented to person, place, and time  Psychiatric:         Mood and Affect: Mood normal          Behavior: Behavior normal  Behavior is cooperative  Thought Content:  Thought content normal

## 2022-05-20 ENCOUNTER — TELEPHONE (OUTPATIENT)
Dept: FAMILY MEDICINE CLINIC | Facility: CLINIC | Age: 82
End: 2022-05-20

## 2022-05-20 NOTE — TELEPHONE ENCOUNTER
Patient called today just to give update that she feels fine  Isabel Oscar She was in our office on 5/19/2022  Isabel Oscar

## 2022-05-24 ENCOUNTER — TELEPHONE (OUTPATIENT)
Dept: FAMILY MEDICINE CLINIC | Facility: CLINIC | Age: 82
End: 2022-05-24

## 2022-05-24 NOTE — TELEPHONE ENCOUNTER
Patient called with update  She only had one BM since taking the Magnesium citrate & miralax  Ava receommended taking the other half of bottle of magnesium with miralax  If no improvement call back to schedule FUP with Ava on Thursday 5/26/22

## 2022-05-27 ENCOUNTER — OFFICE VISIT (OUTPATIENT)
Dept: FAMILY MEDICINE CLINIC | Facility: CLINIC | Age: 82
End: 2022-05-27
Payer: MEDICARE

## 2022-05-27 ENCOUNTER — TELEPHONE (OUTPATIENT)
Dept: FAMILY MEDICINE CLINIC | Facility: CLINIC | Age: 82
End: 2022-05-27

## 2022-05-27 VITALS
HEART RATE: 80 BPM | RESPIRATION RATE: 18 BRPM | WEIGHT: 168 LBS | SYSTOLIC BLOOD PRESSURE: 142 MMHG | DIASTOLIC BLOOD PRESSURE: 70 MMHG | BODY MASS INDEX: 25.92 KG/M2 | TEMPERATURE: 95.6 F

## 2022-05-27 DIAGNOSIS — K58.1 IRRITABLE BOWEL SYNDROME WITH CONSTIPATION: Primary | ICD-10-CM

## 2022-05-27 PROCEDURE — 99213 OFFICE O/P EST LOW 20 MIN: CPT | Performed by: FAMILY MEDICINE

## 2022-05-27 RX ORDER — DICYCLOMINE HYDROCHLORIDE 10 MG/1
10 CAPSULE ORAL 2 TIMES DAILY PRN
Qty: 30 CAPSULE | Refills: 0 | Status: SHIPPED | OUTPATIENT
Start: 2022-05-27

## 2022-05-27 NOTE — PROGRESS NOTES
FAMILY MEDICINE PROGRESS NOTE    Date of Service: 22  Primary Care Provider:   Herberth Boyer MD       Name: Jabari Quarles       : 1940       Age:82 y o  Sex: female      MRN: [id number]      Chief Complaint:Follow-up (Abdominal pain and constipation)       ASSESSMENT and PLAN:  Jabari Quarles is a 79 y o  female with:     Problem List Items Addressed This Visit    None     Visit Diagnoses     Irritable bowel syndrome with constipation    -  Primary    Relevant Medications    dicyclomine (BENTYL) 10 mg capsule        Symptoms most concerning for IBS with constipation, she has not red flag symptoms  Recommended dietary changes of avoiding dairy, and consideration of diet low in histamine  Try course of bentyl for symptoms  SUBJECTIVE:  Jabari Quarles is a 79 y o  female who presents today with a chief complaint of Follow-up (Abdominal pain and constipation)  HPI     Patient has been seen twice in the last 5 weeks with complaints of constipation  She was initially instructed to increase hydration and take Miralax    She did try magnesium citrate, she took a half bottle, had some mild relief, then she had improvement when finishing the bottle  She continues to have pain that comes and goes  She still has discomfort after eating  She describes it as a soreness  The pain is worse with sitting or lying down, it is better when she is up and moving around  She does have lactose intolerance, so symptoms worse when having dairy  Yesterday she had chicken tenders which caused some discomfort  She did try pepcid which was not helpful  She reports that the pain is relieved when she has a bowel movement  She is very upset by having the pain and she has decreased appetite when feeling anxious  She currently has pain in her right groin  Review of Systems   Constitutional: Negative for appetite change, chills, fatigue, fever and unexpected weight change     Gastrointestinal: Positive for abdominal distention and abdominal pain  Negative for anal bleeding, blood in stool, constipation, diarrhea, nausea and vomiting  Genitourinary: Negative for difficulty urinating, dysuria, frequency and urgency  Psychiatric/Behavioral: The patient is nervous/anxious  I have reviewed the patient's Past Medical History  Current Outpatient Medications:     amLODIPine (NORVASC) 2 5 mg tablet, 2 tablets in AM  1 tablet in PM , Disp: 270 tablet, Rfl: 3    Calcium Carb-Cholecalciferol 1000-800 MG-UNIT TABS, Take by mouth, Disp: , Rfl:     Cholecalciferol 2000 units CAPS, Take 1 capsule by mouth, Disp: , Rfl:     ciprofloxacin (CIPRO) 500 mg tablet, Take 500 mg by mouth 2 (two) times a day, Disp: , Rfl:     Cranberry 95771 MG CAPS, Take 1 capsule by mouth in the morning, Disp: , Rfl:     dicyclomine (BENTYL) 10 mg capsule, Take 1 capsule (10 mg total) by mouth 2 (two) times a day as needed (abdominal pain), Disp: 30 capsule, Rfl: 0    irbesartan (AVAPRO) 300 mg tablet, Take 1 tablet (300 mg total) by mouth daily at bedtime, Disp: 90 tablet, Rfl: 3    ketoconazole (NIZORAL) 2 % shampoo, Apply 1 application topically 2 (two) times a week, Disp: , Rfl:     methenamine hippurate (HIPREX) 1 g tablet, Take 1 g by mouth, Disp: , Rfl:     metoprolol tartrate (LOPRESSOR) 50 mg tablet, Take 1 tablet (50 mg total) by mouth 2 (two) times a day, Disp: 180 tablet, Rfl: 3    multivitamin (THERAGRAN) TABS, Take 1 tablet by mouth, Disp: , Rfl:     OBJECTIVE:  /70   Pulse 80   Temp (!) 95 6 °F (35 3 °C)   Resp 18   Wt 76 2 kg (168 lb)   BMI 25 92 kg/m²    BP Readings from Last 3 Encounters:   05/27/22 142/70   05/19/22 138/80   04/25/22 136/70      Wt Readings from Last 3 Encounters:   05/27/22 76 2 kg (168 lb)   05/19/22 77 1 kg (170 lb)   04/25/22 77 1 kg (170 lb)      Physical Exam  Constitutional:       General: She is not in acute distress  Appearance: Normal appearance  She is normal weight   She is not ill-appearing or toxic-appearing  HENT:      Head: Normocephalic and atraumatic  Right Ear: External ear normal       Left Ear: External ear normal       Nose: Nose normal       Mouth/Throat:      Mouth: Mucous membranes are moist    Eyes:      Extraocular Movements: Extraocular movements intact  Conjunctiva/sclera: Conjunctivae normal    Pulmonary:      Effort: Pulmonary effort is normal  No respiratory distress  Abdominal:      General: Bowel sounds are normal  There is no distension  Palpations: Abdomen is soft  Tenderness: There is abdominal tenderness (over bladder) in the suprapubic area  There is no guarding or rebound  Musculoskeletal:         General: Normal range of motion  Cervical back: Normal range of motion and neck supple  Skin:     Findings: No erythema or rash  Neurological:      General: No focal deficit present  Mental Status: She is alert and oriented to person, place, and time  Psychiatric:         Mood and Affect: Mood normal          Behavior: Behavior normal                 Return if symptoms worsen or fail to improve  Digna Reynolds MD    Note: Portions of the record have been created with voice recognition software  Occasional wrong word or "sound a like" substitutions may have occurred due to the inherent limitations of voice recognition software  Read the chart carefully and recognize, using context, where substitutions have occurred

## 2022-05-27 NOTE — PATIENT INSTRUCTIONS
Try magnesium oxide 200 mg nightly, can increase to 400 mg nightly until you are having regular bowel movements  Avoid dairy, greasy fatty foods for 2 to 4 weeks  Tyr lower histamine diet, avoid the following foods:    The following foods contain higher levels of histamine:    fermented dairy products, such as cheese (especially aged), yogurt, sour cream, buttermilk, and kefir  fermented vegetables, such as sauerkraut and kimchi  pickles or pickled veggies  kombucha  cured or fermented meats, such as sausages, salami, and fermented ham  wine, beer, alcohol, and champagne  fermented soy products such as tempeh, miso, soy sauce, and natto  fermented grains, such as sourdough bread  tomatoes  eggplant  spinach  frozen, salted, or canned fish, such as sardines and tuna  vinegar  tomato ketchup

## 2022-05-27 NOTE — TELEPHONE ENCOUNTER
Patient called back  Her RX plan was not open to speak with anyone  She is going to check the cost of meds to pay out of pocket & call back

## 2022-05-27 NOTE — TELEPHONE ENCOUNTER
Dicyclomine medication called in is not covered & requires a prior auth  Patient instructed to call RX plan to see if there is a similar med that is covered in her plan

## 2022-06-14 ENCOUNTER — APPOINTMENT (OUTPATIENT)
Dept: LAB | Age: 82
End: 2022-06-14
Payer: MEDICARE

## 2022-06-14 DIAGNOSIS — N31.9 HIGH COMPLIANCE BLADDER: ICD-10-CM

## 2022-06-14 LAB
BUN SERPL-MCNC: 15 MG/DL (ref 5–25)
CREAT SERPL-MCNC: 0.78 MG/DL (ref 0.6–1.3)
ERYTHROCYTE [DISTWIDTH] IN BLOOD BY AUTOMATED COUNT: 11.8 % (ref 11.6–15.1)
GFR SERPL CREATININE-BSD FRML MDRD: 70 ML/MIN/1.73SQ M
HCT VFR BLD AUTO: 39.2 % (ref 34.8–46.1)
HGB BLD-MCNC: 12.8 G/DL (ref 11.5–15.4)
MCH RBC QN AUTO: 30.8 PG (ref 26.8–34.3)
MCHC RBC AUTO-ENTMCNC: 32.7 G/DL (ref 31.4–37.4)
MCV RBC AUTO: 95 FL (ref 82–98)
PLATELET # BLD AUTO: 270 THOUSANDS/UL (ref 149–390)
PMV BLD AUTO: 10.2 FL (ref 8.9–12.7)
RBC # BLD AUTO: 4.15 MILLION/UL (ref 3.81–5.12)
WBC # BLD AUTO: 4.72 THOUSAND/UL (ref 4.31–10.16)

## 2022-06-14 PROCEDURE — 82565 ASSAY OF CREATININE: CPT

## 2022-06-14 PROCEDURE — 84520 ASSAY OF UREA NITROGEN: CPT

## 2022-06-14 PROCEDURE — 85027 COMPLETE CBC AUTOMATED: CPT

## 2022-06-14 PROCEDURE — 36415 COLL VENOUS BLD VENIPUNCTURE: CPT

## 2022-06-21 ENCOUNTER — TELEPHONE (OUTPATIENT)
Dept: FAMILY MEDICINE CLINIC | Facility: CLINIC | Age: 82
End: 2022-06-21

## 2022-06-21 DIAGNOSIS — I10 PRIMARY HYPERTENSION: ICD-10-CM

## 2022-06-21 DIAGNOSIS — E03.8 SUBCLINICAL HYPOTHYROIDISM: ICD-10-CM

## 2022-06-21 DIAGNOSIS — R73.01 IMPAIRED FASTING GLUCOSE: Primary | ICD-10-CM

## 2022-06-21 DIAGNOSIS — D72.819 LEUKOPENIA, UNSPECIFIED TYPE: ICD-10-CM

## 2022-06-21 NOTE — TELEPHONE ENCOUNTER
Patient is requesting labs for upcoming visit in August  Call patient when in chart & mail orders to home address

## 2022-08-04 ENCOUNTER — APPOINTMENT (OUTPATIENT)
Dept: LAB | Age: 82
End: 2022-08-04
Payer: MEDICARE

## 2022-08-04 LAB
ALBUMIN SERPL BCP-MCNC: 3.4 G/DL (ref 3.5–5)
ALP SERPL-CCNC: 56 U/L (ref 46–116)
ALT SERPL W P-5'-P-CCNC: 24 U/L (ref 12–78)
ANION GAP SERPL CALCULATED.3IONS-SCNC: 6 MMOL/L (ref 4–13)
AST SERPL W P-5'-P-CCNC: 18 U/L (ref 5–45)
BASOPHILS # BLD AUTO: 0.06 THOUSANDS/ΜL (ref 0–0.1)
BASOPHILS NFR BLD AUTO: 1 % (ref 0–1)
BILIRUB SERPL-MCNC: 0.58 MG/DL (ref 0.2–1)
BUN SERPL-MCNC: 18 MG/DL (ref 5–25)
CALCIUM ALBUM COR SERPL-MCNC: 9.4 MG/DL (ref 8.3–10.1)
CALCIUM SERPL-MCNC: 8.9 MG/DL (ref 8.3–10.1)
CHLORIDE SERPL-SCNC: 105 MMOL/L (ref 96–108)
CO2 SERPL-SCNC: 26 MMOL/L (ref 21–32)
CREAT SERPL-MCNC: 0.84 MG/DL (ref 0.6–1.3)
EOSINOPHIL # BLD AUTO: 0.2 THOUSAND/ΜL (ref 0–0.61)
EOSINOPHIL NFR BLD AUTO: 4 % (ref 0–6)
ERYTHROCYTE [DISTWIDTH] IN BLOOD BY AUTOMATED COUNT: 11.9 % (ref 11.6–15.1)
EST. AVERAGE GLUCOSE BLD GHB EST-MCNC: 126 MG/DL
GFR SERPL CREATININE-BSD FRML MDRD: 64 ML/MIN/1.73SQ M
GLUCOSE P FAST SERPL-MCNC: 89 MG/DL (ref 65–99)
HBA1C MFR BLD: 6 %
HCT VFR BLD AUTO: 41.4 % (ref 34.8–46.1)
HGB BLD-MCNC: 13.4 G/DL (ref 11.5–15.4)
IMM GRANULOCYTES # BLD AUTO: 0.01 THOUSAND/UL (ref 0–0.2)
IMM GRANULOCYTES NFR BLD AUTO: 0 % (ref 0–2)
LYMPHOCYTES # BLD AUTO: 1.49 THOUSANDS/ΜL (ref 0.6–4.47)
LYMPHOCYTES NFR BLD AUTO: 28 % (ref 14–44)
MCH RBC QN AUTO: 30.3 PG (ref 26.8–34.3)
MCHC RBC AUTO-ENTMCNC: 32.4 G/DL (ref 31.4–37.4)
MCV RBC AUTO: 94 FL (ref 82–98)
MONOCYTES # BLD AUTO: 0.63 THOUSAND/ΜL (ref 0.17–1.22)
MONOCYTES NFR BLD AUTO: 12 % (ref 4–12)
NEUTROPHILS # BLD AUTO: 2.97 THOUSANDS/ΜL (ref 1.85–7.62)
NEUTS SEG NFR BLD AUTO: 55 % (ref 43–75)
NRBC BLD AUTO-RTO: 0 /100 WBCS
PLATELET # BLD AUTO: 290 THOUSANDS/UL (ref 149–390)
PMV BLD AUTO: 10.4 FL (ref 8.9–12.7)
POTASSIUM SERPL-SCNC: 3.9 MMOL/L (ref 3.5–5.3)
PROT SERPL-MCNC: 6.6 G/DL (ref 6.4–8.4)
RBC # BLD AUTO: 4.42 MILLION/UL (ref 3.81–5.12)
SODIUM SERPL-SCNC: 137 MMOL/L (ref 135–147)
T4 FREE SERPL-MCNC: 0.88 NG/DL (ref 0.76–1.46)
TSH SERPL DL<=0.05 MIU/L-ACNC: 4.59 UIU/ML (ref 0.45–4.5)
WBC # BLD AUTO: 5.36 THOUSAND/UL (ref 4.31–10.16)

## 2022-08-04 PROCEDURE — 84439 ASSAY OF FREE THYROXINE: CPT | Performed by: FAMILY MEDICINE

## 2022-08-04 PROCEDURE — 83036 HEMOGLOBIN GLYCOSYLATED A1C: CPT | Performed by: FAMILY MEDICINE

## 2022-08-04 PROCEDURE — 84443 ASSAY THYROID STIM HORMONE: CPT | Performed by: FAMILY MEDICINE

## 2022-08-04 PROCEDURE — 85025 COMPLETE CBC W/AUTO DIFF WBC: CPT | Performed by: FAMILY MEDICINE

## 2022-08-04 PROCEDURE — 36415 COLL VENOUS BLD VENIPUNCTURE: CPT | Performed by: FAMILY MEDICINE

## 2022-08-04 PROCEDURE — 80053 COMPREHEN METABOLIC PANEL: CPT | Performed by: FAMILY MEDICINE

## 2022-08-17 NOTE — PROGRESS NOTES
Assessment/Plan:       Diagnoses and all orders for this visit:    Primary hypertension  -     CBC and differential  -     Comprehensive metabolic panel    Subclinical hypothyroidism  -     TSH, 3rd generation with Free T4 reflex    Prediabetes  -     Hemoglobin A1C    Leukopenia, unspecified type    Primary osteoarthritis of right knee    History of recurrent UTIs    Medicare annual wellness visit, subsequent    Encounter for screening for other disorder    Encounter for screening mammogram for breast cancer  -     Mammo screening bilateral w cad; Future          Continue with current medications  No treatment indicated at this time for subclinical hypothyroidism  OV 6 months with labs  Flu vaccine in the fall      Patient ID: Danuta Bass is a 79 y o  female  Followup visit  Medications reviewed  Hypertension-blood pressures have been stable on 3 drug regimen- Amlodipine was increased to 5 mg AM and 2 5 mg PM, Irbesartan 300 mg daily and Metoprolol 50 mg BID  Past history of hyponatremia and hypokalemia while on HCTZ  Labs 08/2022  creatinine 0 84  GFR 64  Electrolytes normal  K+ 3 9  Hgb 13 4  12/2020 Lipid profile cholesterol 187  TGs 88  HDL 55    LFTs normal  Prediabetes FBS 89  A1c 6 0  + FH type DM        Recent Results (from the past 672 hour(s))   Comprehensive metabolic panel    Collection Time: 08/04/22  7:24 AM   Result Value Ref Range    Sodium 137 135 - 147 mmol/L    Potassium 3 9 3 5 - 5 3 mmol/L    Chloride 105 96 - 108 mmol/L    CO2 26 21 - 32 mmol/L    ANION GAP 6 4 - 13 mmol/L    BUN 18 5 - 25 mg/dL    Creatinine 0 84 0 60 - 1 30 mg/dL    Glucose, Fasting 89 65 - 99 mg/dL    Calcium 8 9 8 3 - 10 1 mg/dL    Corrected Calcium 9 4 8 3 - 10 1 mg/dL    AST 18 5 - 45 U/L    ALT 24 12 - 78 U/L    Alkaline Phosphatase 56 46 - 116 U/L    Total Protein 6 6 6 4 - 8 4 g/dL    Albumin 3 4 (L) 3 5 - 5 0 g/dL    Total Bilirubin 0 58 0 20 - 1 00 mg/dL    eGFR 64 ml/min/1 73sq m   CBC and differential    Collection Time: 08/04/22  7:24 AM   Result Value Ref Range    WBC 5 36 4 31 - 10 16 Thousand/uL    RBC 4 42 3 81 - 5 12 Million/uL    Hemoglobin 13 4 11 5 - 15 4 g/dL    Hematocrit 41 4 34 8 - 46 1 %    MCV 94 82 - 98 fL    MCH 30 3 26 8 - 34 3 pg    MCHC 32 4 31 4 - 37 4 g/dL    RDW 11 9 11 6 - 15 1 %    MPV 10 4 8 9 - 12 7 fL    Platelets 897 189 - 633 Thousands/uL    nRBC 0 /100 WBCs    Neutrophils Relative 55 43 - 75 %    Immat GRANS % 0 0 - 2 %    Lymphocytes Relative 28 14 - 44 %    Monocytes Relative 12 4 - 12 %    Eosinophils Relative 4 0 - 6 %    Basophils Relative 1 0 - 1 %    Neutrophils Absolute 2 97 1 85 - 7 62 Thousands/µL    Immature Grans Absolute 0 01 0 00 - 0 20 Thousand/uL    Lymphocytes Absolute 1 49 0 60 - 4 47 Thousands/µL    Monocytes Absolute 0 63 0 17 - 1 22 Thousand/µL    Eosinophils Absolute 0 20 0 00 - 0 61 Thousand/µL    Basophils Absolute 0 06 0 00 - 0 10 Thousands/µL   TSH, 3rd generation with Free T4 reflex    Collection Time: 08/04/22  7:24 AM   Result Value Ref Range    TSH 3RD GENERATON 4 590 (H) 0 450 - 4 500 uIU/mL   Hemoglobin A1C    Collection Time: 08/04/22  7:24 AM   Result Value Ref Range    Hemoglobin A1C 6 0 (H) Normal 3 8-5 6%; PreDiabetic 5 7-6 4%;  Diabetic >=6 5%; Glycemic control for adults with diabetes <7 0% %     mg/dl   T4, free    Collection Time: 08/04/22  7:24 AM   Result Value Ref Range    Free T4 0 88 0 76 - 1 46 ng/dL       Lab Results   Component Value Date    CHOLESTEROL 187 12/14/2020    CHOLESTEROL 193 05/21/2019    CHOLESTEROL 186 05/24/2018     Lab Results   Component Value Date    HDL 55 12/14/2020    HDL 52 05/21/2019    HDL 55 05/24/2018     Lab Results   Component Value Date    TRIG 88 12/14/2020    TRIG 95 05/21/2019    TRIG 81 05/24/2018     Lab Results   Component Value Date    LDLCALC 114 (H) 12/14/2020         The following portions of the patient's history were reviewed and updated as appropriate: allergies, current medications, past family history, past medical history, past social history, past surgical history and problem list     Review of Systems   Constitutional: Positive for unexpected weight change (3 lb weight gain from 05/2022 )  Negative for appetite change, chills, fatigue and fever  HENT: Negative for congestion, ear pain, hearing loss, rhinorrhea, sore throat and trouble swallowing  Chronic nasal congestion improved with Flonase  Eyes: Negative for visual disturbance  Cataract surgery OU    Respiratory: Negative for cough, shortness of breath and wheezing  Cardiovascular: Negative for chest pain, palpitations and leg swelling  01/2020 admission for substernal chest pain  Patient was initialy seen at urgent care  EKG NSR with frequent PVCs  she was sent to [facility]  Initial EKG NSR with fusion complexes  No acute changes  2nd EKG sinus bradycardia with PACs  troponins x 3 negative  Chest x-ray no active disease  Stress echocardiogram normal   EF 60%  Gastrointestinal: Negative for abdominal pain, blood in stool, constipation, diarrhea, nausea and vomiting  Chronic constipation  Colonoscopy 03/2019 normal except for diverticulosis  + FH colon CA sister  Endocrine: Negative for polydipsia and polyuria  Subclinical hypothyroidism  TSH 07/2017  thyroid ABs negative  02/2015 DEXA scan normal     Lab Results       Component                Value               Date                       [id number]             4 590 (H)           08/04/2022            Lab Results       Component                Value               Date                       [id number]             3 640               01/13/2022                           Genitourinary: Negative for difficulty urinating, dysuria and hematuria  History of recurrent UTIs followed by Urology  On Hiprex  02/2019 kidney bladder ultrasound normal minimal PVR  29 ML    Simple cyst left adnexa without significant change  pelvic u/s 08/2016 stable simple left ovarian cyst  01/2017  normal at 9 5 followed by GYN ,    Musculoskeletal: Negative for arthralgias and myalgias  OA right knee s/p steroid injection by ortho with symptom relief  X rays right knee 06/2020  Reviewed  Prior MRI right knee showed  meniscal tear  Skin: Negative for rash  Allergic/Immunologic: Positive for environmental allergies  Neurological: Negative for dizziness and headaches  Hematological: Negative for adenopathy  Does not bruise/bleed easily  history of mild leukopenia  06/2018  Vitamin B12 589  Folate greater than 20  Lab Results       Component                Value               Date                       WBC                      5 36                08/04/2022                 HGB                      13 4                08/04/2022                 HCT                      41 4                08/04/2022                 MCV                      94                  08/04/2022                 PLT                      290                 08/04/2022                                WBC       Date                     Value               Ref Range           Status                06/29/2021               5 45                4 31 - 10 16 T*     Final                 12/14/2020               4 47                4 31 - 10 16 T*     Final                 05/27/2020               5 07                4 31 - 10 16 T*     Final                 06/11/2015               4 75                4 31 - 10 16 T*     Final                 04/02/2014               5 24                4 31 - 10 16 T*     Final                          Psychiatric/Behavioral: Negative for dysphoric mood and sleep disturbance  The patient is not nervous/anxious            Objective:    /70   Pulse (!) 48   Temp (!) 95 3 °F (35 2 °C)   Resp 16   Ht 5' 7 5" (1 715 m)   Wt 77 6 kg (171 lb)   SpO2 96%   BMI 26 39 kg/m²     BP Readings from Last 3 Encounters:   08/18/22 132/70   05/27/22 142/70   05/19/22 138/80     Wt Readings from Last 3 Encounters:   08/18/22 77 6 kg (171 lb)   05/27/22 76 2 kg (168 lb)   05/19/22 77 1 kg (170 lb)          Physical Exam  Vitals and nursing note reviewed  Constitutional:       General: She is not in acute distress  Appearance: She is well-developed  HENT:      Right Ear: Tympanic membrane and ear canal normal       Left Ear: Tympanic membrane and ear canal normal    Eyes:      General: No scleral icterus  Extraocular Movements: Extraocular movements intact  Conjunctiva/sclera: Conjunctivae normal       Pupils: Pupils are equal, round, and reactive to light  Neck:      Thyroid: No thyroid mass or thyromegaly  Vascular: No carotid bruit or JVD  Trachea: No tracheal deviation  Cardiovascular:      Rate and Rhythm: Bradycardia present  Heart sounds: Normal heart sounds  No murmur heard  No gallop  Comments: Apical HR 52   Pulmonary:      Effort: Pulmonary effort is normal  No respiratory distress  Breath sounds: Normal breath sounds  No wheezing or rales  Chest:   Breasts:      Right: No supraclavicular adenopathy  Left: No supraclavicular adenopathy  Musculoskeletal:      Right lower leg: No edema  Left lower leg: No edema  Lymphadenopathy:      Cervical: No cervical adenopathy  Upper Body:      Right upper body: No supraclavicular adenopathy  Left upper body: No supraclavicular adenopathy  Skin:     Findings: No rash  Nails: There is no clubbing  Neurological:      General: No focal deficit present  Mental Status: She is alert and oriented to person, place, and time     Psychiatric:         Mood and Affect: Mood normal          Behavior: Behavior normal          Cognition and Memory: Cognition normal

## 2022-08-18 ENCOUNTER — OFFICE VISIT (OUTPATIENT)
Dept: FAMILY MEDICINE CLINIC | Facility: CLINIC | Age: 82
End: 2022-08-18
Payer: MEDICARE

## 2022-08-18 VITALS
HEIGHT: 68 IN | HEART RATE: 48 BPM | RESPIRATION RATE: 16 BRPM | WEIGHT: 171 LBS | TEMPERATURE: 95.3 F | SYSTOLIC BLOOD PRESSURE: 132 MMHG | DIASTOLIC BLOOD PRESSURE: 70 MMHG | BODY MASS INDEX: 25.91 KG/M2 | OXYGEN SATURATION: 96 %

## 2022-08-18 DIAGNOSIS — M17.11 PRIMARY OSTEOARTHRITIS OF RIGHT KNEE: ICD-10-CM

## 2022-08-18 DIAGNOSIS — Z00.00 MEDICARE ANNUAL WELLNESS VISIT, SUBSEQUENT: ICD-10-CM

## 2022-08-18 DIAGNOSIS — Z12.31 ENCOUNTER FOR SCREENING MAMMOGRAM FOR BREAST CANCER: ICD-10-CM

## 2022-08-18 DIAGNOSIS — Z13.89 ENCOUNTER FOR SCREENING FOR OTHER DISORDER: ICD-10-CM

## 2022-08-18 DIAGNOSIS — R73.03 PREDIABETES: ICD-10-CM

## 2022-08-18 DIAGNOSIS — Z87.440 HISTORY OF RECURRENT UTIS: ICD-10-CM

## 2022-08-18 DIAGNOSIS — D72.819 LEUKOPENIA, UNSPECIFIED TYPE: ICD-10-CM

## 2022-08-18 DIAGNOSIS — I10 PRIMARY HYPERTENSION: Primary | ICD-10-CM

## 2022-08-18 DIAGNOSIS — E03.8 SUBCLINICAL HYPOTHYROIDISM: ICD-10-CM

## 2022-08-18 PROBLEM — R73.01 IMPAIRED FASTING GLUCOSE: Status: RESOLVED | Noted: 2022-01-31 | Resolved: 2022-08-18

## 2022-08-18 PROCEDURE — G0438 PPPS, INITIAL VISIT: HCPCS | Performed by: FAMILY MEDICINE

## 2022-08-18 PROCEDURE — 99214 OFFICE O/P EST MOD 30 MIN: CPT | Performed by: FAMILY MEDICINE

## 2022-08-18 NOTE — PROGRESS NOTES
Assessment and Plan:     Problem List Items Addressed This Visit        Endocrine    Subclinical hypothyroidism    Relevant Orders    TSH, 3rd generation with Free T4 reflex       Cardiovascular and Mediastinum    Hypertension - Primary    Relevant Orders    CBC and differential    Comprehensive metabolic panel       Musculoskeletal and Integument    Osteoarthritis of knee       Other    History of recurrent UTIs    Leukopenia    Prediabetes    Relevant Orders    Hemoglobin A1C      Other Visit Diagnoses     Medicare annual wellness visit, subsequent        Encounter for screening for other disorder        Encounter for screening mammogram for breast cancer        Relevant Orders    Mammo screening bilateral w cad        BMI Counseling: Body mass index is 26 39 kg/m²  The BMI is above normal  Nutrition recommendations include decreasing portion sizes, consuming healthier snacks, moderation in carbohydrate intake, reducing intake of saturated and trans fat and reducing intake of cholesterol  Exercise recommendations include exercising 3-5 times per week  No pharmacotherapy was ordered  Rationale for BMI follow-up plan is due to patient being overweight or obese  Depression Screening and Follow-up Plan: Patient was screened for depression during today's encounter  They screened negative with a PHQ-2 score of 0  Preventive health issues were discussed with patient, and age appropriate screening tests were ordered as noted in patient's After Visit Summary  Personalized health advice and appropriate referrals for health education or preventive services given if needed, as noted in patient's After Visit Summary       History of Present Illness:     Patient presents for a Medicare Wellness Visit    HPI   Patient Care Team:  Torri Anders MD as PCP - MD Madelyn Carson MD (Colon and Rectal Surgery)     Review of Systems:     Review of Systems     Problem List:     Patient Active Problem List   Diagnosis    Cyst of left ovary    Diverticulosis    Hypertension    Osteoarthritis of knee    Gastroesophageal reflux disease without esophagitis    Family history of colon cancer    History of recurrent UTIs    Leukopenia    Prediabetes    Subclinical hypothyroidism      Past Medical and Surgical History:     Past Medical History:   Diagnosis Date    Accelerated essential hypertension     last assessed 08/17/2015    Depression with anxiety     last assesed 08/16/2012    Neutropenia ([address] ) 1/8/2020    Post-menopausal bleeding 12/5/2013    Strep pharyngitis 8/18/2019     Past Surgical History:   Procedure Laterality Date    BLEPHAROPLASTY      upper lid w/excessive skin    CARDIOVASCULAR STRESS TEST      onset June 2005    COLONOSCOPY      DILATION AND CURETTAGE, DIAGNOSTIC / THERAPEUTIC      ENDOMETRIAL BIOPSY      negative for dysplasia, hyperplasia and malignancy, resolved 03/25/2013    TUBAL LIGATION        Family History:     Family History   Problem Relation Age of Onset    Heart disease Father         cardiac disorder    Skin cancer Father     Colon cancer Sister 80    Heart disease Family     Hypertension Family     Coronary artery disease Family     No Known Problems Daughter     No Known Problems Sister     No Known Problems Sister     No Known Problems Daughter     No Known Problems Daughter     No Known Problems Maternal Aunt     No Known Problems Maternal Aunt     No Known Problems Maternal Aunt     No Known Problems Paternal Aunt     No Known Problems Paternal Aunt     No Known Problems Paternal Aunt     No Known Problems Mother     No Known Problems Maternal Grandmother     No Known Problems Maternal Grandfather     No Known Problems Paternal Grandmother     No Known Problems Paternal Grandfather       Social History:     Social History     Socioeconomic History    Marital status:       Spouse name: None    Number of children: None    Years of education: None    Highest education level: None   Occupational History    None   Tobacco Use    Smoking status: Never Smoker    Smokeless tobacco: Never Used   Substance and Sexual Activity    Alcohol use: Yes     Comment: rare    Drug use: No    Sexual activity: None   Other Topics Concern    None   Social History Narrative    Denied history of home environment domestic violence     Social Determinants of Health     Financial Resource Strain: Low Risk     Difficulty of Paying Living Expenses: Not hard at all   Food Insecurity: Not on file   Transportation Needs: No Transportation Needs    Lack of Transportation (Medical): No    Lack of Transportation (Non-Medical): No   Physical Activity: Not on file   Stress: Not on file   Social Connections: Not on file   Intimate Partner Violence: Not on file   Housing Stability: Not on file      Medications and Allergies:     Current Outpatient Medications   Medication Sig Dispense Refill    amLODIPine (NORVASC) 2 5 mg tablet 2 tablets in AM  1 tablet in PM  270 tablet 3    Calcium Carb-Cholecalciferol 1000-800 MG-UNIT TABS Take by mouth      Cholecalciferol 2000 units CAPS Take 1 capsule by mouth      Cranberry 22765 MG CAPS Take 1 capsule by mouth in the morning      irbesartan (AVAPRO) 300 mg tablet Take 1 tablet (300 mg total) by mouth daily at bedtime 90 tablet 3    ketoconazole (NIZORAL) 2 % shampoo Apply 1 application topically 2 (two) times a week      metoprolol tartrate (LOPRESSOR) 50 mg tablet Take 1 tablet (50 mg total) by mouth 2 (two) times a day 180 tablet 3    multivitamin (THERAGRAN) TABS Take 1 tablet by mouth       No current facility-administered medications for this visit       Allergies   Allergen Reactions    Penicillins Rash      Immunizations:     Immunization History   Administered Date(s) Administered    COVID-19 MODERNA VACC 0 5 ML IM 02/03/2021, 03/03/2021, 11/02/2021, 07/15/2022    INFLUENZA 09/28/2019, 10/05/2020    Influenza Split High Dose Preservative Free IM 09/18/2013, 09/23/2014, 11/17/2015, 11/30/2016, 12/12/2017, 10/05/2020    Influenza, high dose seasonal 0 7 mL 11/05/2018, 09/28/2019, 10/04/2021, 11/16/2021    Influenza, seasonal, injectable 1940, 09/10/2012    Pneumococcal Conjugate 13-Valent 11/17/2015    Pneumococcal Polysaccharide PPV23 11/02/2006    Td (adult), adsorbed 12/29/2010      Health Maintenance:         Topic Date Due    Breast Cancer Screening: Mammogram  03/17/2022    Colorectal Cancer Screening  03/07/2024         Topic Date Due    Influenza Vaccine (1) 09/01/2022      Medicare Screening Tests and Risk Assessments:     Lisha Coats is here for her Subsequent Wellness visit  Last Medicare Wellness visit information reviewed, patient interviewed and updates made to the record today  Health Risk Assessment:   Patient rates overall health as good  Patient feels that their physical health rating is same  Patient is satisfied with their life  Eyesight was rated as same  Hearing was rated as same  Patient feels that their emotional and mental health rating is same  Patients states they are never, rarely angry  Patient states they are sometimes unusually tired/fatigued  Pain experienced in the last 7 days has been none  Patient states that she has experienced no weight loss or gain in last 6 months  Depression Screening:   PHQ-2 Score: 0      Fall Risk Screening: In the past year, patient has experienced: no history of falling in past year      Urinary Incontinence Screening:   Patient has not leaked urine accidently in the last six months  Home Safety:  Patient does not have trouble with stairs inside or outside of their home  Patient has working smoke alarms and has working carbon monoxide detector  Home safety hazards include: none  Nutrition:   Current diet is Regular  Medications:   Patient is currently taking over-the-counter supplements   OTC medications include: see medication list  Patient is able to manage medications  Activities of Daily Living (ADLs)/Instrumental Activities of Daily Living (IADLs):   Walk and transfer into and out of bed and chair?: Yes  Dress and groom yourself?: Yes    Bathe or shower yourself?: Yes    Feed yourself? Yes  Do your laundry/housekeeping?: Yes  Manage your money, pay your bills and track your expenses?: Yes  Make your own meals?: Yes    Do your own shopping?: Yes    Previous Hospitalizations:   Any hospitalizations or ED visits within the last 12 months?: No      Advance Care Planning:   Living will: No    Advanced directive: No      Cognitive Screening:   Provider or family/friend/caregiver concerned regarding cognition?: No    PREVENTIVE SCREENINGS      Cardiovascular Screening:    General: History Lipid Disorder and Screening Current      Diabetes Screening:     General: Screening Current      Colorectal Cancer Screening:     General: Screening Current      Breast Cancer Screening:     General: Risks and Benefits Discussed    Due for: Mammogram        Cervical Cancer Screening:    General: Screening Not Indicated      Osteoporosis Screening:    General: Screening Current      Abdominal Aortic Aneurysm (AAA) Screening:        General: Screening Not Indicated      Lung Cancer Screening:     General: Screening Not Indicated      Hepatitis C Screening:    General: Screening Not Indicated    Screening, Brief Intervention, and Referral to Treatment (SBIRT)    Screening  Typical number of drinks in a day: 0  Typical number of drinks in a week: 0  Interpretation: Low risk drinking behavior  Single Item Drug Screening:  How often have you used an illegal drug (including marijuana) or a prescription medication for non-medical reasons in the past year? never    Single Item Drug Screen Score: 0  Interpretation: Negative screen for possible drug use disorder    Brief Intervention  Alcohol & drug use screenings were reviewed   No concerns regarding substance use disorder identified  Other Counseling Topics:   Calcium and vitamin D intake and regular weightbearing exercise       No exam data present     Physical Exam:     /70   Pulse (!) 48   Temp (!) 95 3 °F (35 2 °C)   Resp 16   Ht 5' 7 5" (1 715 m)   Wt 77 6 kg (171 lb)   SpO2 96%   BMI 26 39 kg/m²     Physical Exam     Shobha Aguilar MD

## 2022-08-18 NOTE — PATIENT INSTRUCTIONS
Medicare Preventive Visit Patient Instructions  Thank you for completing your Welcome to Medicare Visit or Medicare Annual Wellness Visit today  Your next wellness visit will be due in one year (8/19/2023)  The screening/preventive services that you may require over the next 5-10 years are detailed below  Some tests may not apply to you based off risk factors and/or age  Screening tests ordered at today's visit but not completed yet may show as past due  Also, please note that scanned in results may not display below  Preventive Screenings:  Service Recommendations Previous Testing/Comments   Colorectal Cancer Screening  * Colonoscopy    * Fecal Occult Blood Test (FOBT)/Fecal Immunochemical Test (FIT)  * Fecal DNA/Cologuard Test  * Flexible Sigmoidoscopy Age: 39-70 years old   Colonoscopy: every 10 years (may be performed more frequently if at higher risk)  OR  FOBT/FIT: every 1 year  OR  Cologuard: every 3 years  OR  Sigmoidoscopy: every 5 years  Screening may be recommended earlier than age 39 if at higher risk for colorectal cancer  Also, an individualized decision between you and your healthcare provider will decide whether screening between the ages of 74-80 would be appropriate  Colonoscopy: 03/07/2019  FOBT/FIT: Not on file  Cologuard: Not on file  Sigmoidoscopy: Not on file    Screening Current     Breast Cancer Screening Age: 36 years old  Frequency: every 1-2 years  Not required if history of left and right mastectomy Mammogram: 03/17/2021    Screening Current   Cervical Cancer Screening Between the ages of 21-29, pap smear recommended once every 3 years  Between the ages of 33-67, can perform pap smear with HPV co-testing every 5 years     Recommendations may differ for women with a history of total hysterectomy, cervical cancer, or abnormal pap smears in past  Pap Smear: 02/19/2020    Screening Not Indicated   Hepatitis C Screening Once for adults born between 1945 and 1965  More frequently in patients at high risk for Hepatitis C Hep C Antibody: Not on file        Diabetes Screening 1-2 times per year if you're at risk for diabetes or have pre-diabetes Fasting glucose: 89 mg/dL (8/4/2022)  A1C: 6 0 % (8/4/2022)  Screening Current   Cholesterol Screening Once every 5 years if you don't have a lipid disorder  May order more often based on risk factors  Lipid panel: 12/14/2020    Screening Not Indicated  History Lipid Disorder     Other Preventive Screenings Covered by Medicare:  1  Abdominal Aortic Aneurysm (AAA) Screening: covered once if your at risk  You're considered to be at risk if you have a family history of AAA  2  Lung Cancer Screening: covers low dose CT scan once per year if you meet all of the following conditions: (1) Age 50-69; (2) No signs or symptoms of lung cancer; (3) Current smoker or have quit smoking within the last 15 years; (4) You have a tobacco smoking history of at least 20 pack years (packs per day multiplied by number of years you smoked); (5) You get a written order from a healthcare provider  3  Glaucoma Screening: covered annually if you're considered high risk: (1) You have diabetes OR (2) Family history of glaucoma OR (3)  aged 48 and older OR (3)  American aged 72 and older  3  Osteoporosis Screening: covered every 2 years if you meet one of the following conditions: (1) You're estrogen deficient and at risk for osteoporosis based off medical history and other findings; (2) Have a vertebral abnormality; (3) On glucocorticoid therapy for more than 3 months; (4) Have primary hyperparathyroidism; (5) On osteoporosis medications and need to assess response to drug therapy  · Last bone density test (DXA Scan): 02/05/2015  5  HIV Screening: covered annually if you're between the age of 12-76  Also covered annually if you are younger than 13 and older than 72 with risk factors for HIV infection   For pregnant patients, it is covered up to 3 times per pregnancy  Immunizations:  Immunization Recommendations   Influenza Vaccine Annual influenza vaccination during flu season is recommended for all persons aged >= 6 months who do not have contraindications   Pneumococcal Vaccine   * Pneumococcal conjugate vaccine = PCV13 (Prevnar 13), PCV15 (Vaxneuvance), PCV20 (Prevnar 20)  * Pneumococcal polysaccharide vaccine = PPSV23 (Pneumovax) Adults 25-60 years old: 1-3 doses may be recommended based on certain risk factors  Adults 72 years old: 1-2 doses may be recommended based off what pneumonia vaccine you previously received   Hepatitis B Vaccine 3 dose series if at intermediate or high risk (ex: diabetes, end stage renal disease, liver disease)   Tetanus (Td) Vaccine - COST NOT COVERED BY MEDICARE PART B Following completion of primary series, a booster dose should be given every 10 years to maintain immunity against tetanus  Td may also be given as tetanus wound prophylaxis  Tdap Vaccine - COST NOT COVERED BY MEDICARE PART B Recommended at least once for all adults  For pregnant patients, recommended with each pregnancy  Shingles Vaccine (Shingrix) - COST NOT COVERED BY MEDICARE PART B  2 shot series recommended in those aged 48 and above     Health Maintenance Due:      Topic Date Due    Breast Cancer Screening: Mammogram  03/17/2022    Colorectal Cancer Screening  03/07/2024     Immunizations Due:      Topic Date Due    Influenza Vaccine (1) 09/01/2022     Advance Directives   What are advance directives? Advance directives are legal documents that state your wishes and plans for medical care  These plans are made ahead of time in case you lose your ability to make decisions for yourself  Advance directives can apply to any medical decision, such as the treatments you want, and if you want to donate organs  What are the types of advance directives? There are many types of advance directives, and each state has rules about how to use them   You may choose a combination of any of the following:  · Living will: This is a written record of the treatment you want  You can also choose which treatments you do not want, which to limit, and which to stop at a certain time  This includes surgery, medicine, IV fluid, and tube feedings  · Durable power of  for healthcare [place]): This is a written record that states who you want to make healthcare choices for you when you are unable to make them for yourself  This person, called a proxy, is usually a family member or a friend  You may choose more than 1 proxy  · Do not resuscitate (DNR) order:  A DNR order is used in case your heart stops beating or you stop breathing  It is a request not to have certain forms of treatment, such as CPR  A DNR order may be included in other types of advance directives  · Medical directive: This covers the care that you want if you are in a coma, near death, or unable to make decisions for yourself  You can list the treatments you want for each condition  Treatment may include pain medicine, surgery, blood transfusions, dialysis, IV or tube feedings, and a ventilator (breathing machine)  · Values history: This document has questions about your views, beliefs, and how you feel and think about life  This information can help others choose the care that you would choose  Why are advance directives important? An advance directive helps you control your care  Although spoken wishes may be used, it is better to have your wishes written down  Spoken wishes can be misunderstood, or not followed  Treatments may be given even if you do not want them  An advance directive may make it easier for your family to make difficult choices about your care  Urinary Incontinence   Urinary incontinence (UI)  is when you lose control of your bladder  UI develops because your bladder cannot store or empty urine properly   The 3 most common types of UI are stress incontinence, urge incontinence, or both   Medicines:   · May be given to help strengthen your bladder control  Report any side effects of medication to your healthcare provider  Do pelvic muscle exercises often:  Your pelvic muscles help you stop urinating  Squeeze these muscles tight for 5 seconds, then relax for 5 seconds  Gradually work up to squeezing for 10 seconds  Do 3 sets of 15 repetitions a day, or as directed  This will help strengthen your pelvic muscles and improve bladder control  Train your bladder:  Go to the bathroom at set times, such as every 2 hours, even if you do not feel the urge to go  You can also try to hold your urine when you feel the urge to go  For example, hold your urine for 5 minutes when you feel the urge to go  As that becomes easier, hold your urine for 10 minutes  Self-care:   · Keep a UI record  Write down how often you leak urine and how much you leak  Make a note of what you were doing when you leaked urine  · Drink liquids as directed  You may need to limit the amount of liquid you drink to help control your urine leakage  Do not drink any liquid right before you go to bed  Limit or do not have drinks that contain caffeine or alcohol  · Prevent constipation  Eat a variety of high-fiber foods  Good examples are high-fiber cereals, beans, vegetables, and whole-grain breads  Walking is the best way to trigger your intestines to have a bowel movement  · Exercise regularly and maintain a healthy weight  Weight loss and exercise will decrease pressure on your bladder and help you control your leakage  · Use a catheter as directed  to help empty your bladder  A catheter is a tiny, plastic tube that is put into your bladder to drain your urine  · Go to behavior therapy as directed  Behavior therapy may be used to help you learn to control your urge to urinate      Weight Management   Why it is important to manage your weight:  Being overweight increases your risk of health conditions such as heart disease, high blood pressure, type 2 diabetes, and certain types of cancer  It can also increase your risk for osteoarthritis, sleep apnea, and other respiratory problems  Aim for a slow, steady weight loss  Even a small amount of weight loss can lower your risk of health problems  How to lose weight safely:  A safe and healthy way to lose weight is to eat fewer calories and get regular exercise  You can lose up about 1 pound a week by decreasing the number of calories you eat by 500 calories each day  Healthy meal plan for weight management:  A healthy meal plan includes a variety of foods, contains fewer calories, and helps you stay healthy  A healthy meal plan includes the following:  · Eat whole-grain foods more often  A healthy meal plan should contain fiber  Fiber is the part of grains, fruits, and vegetables that is not broken down by your body  Whole-grain foods are healthy and provide extra fiber in your diet  Some examples of whole-grain foods are whole-wheat breads and pastas, oatmeal, brown rice, and bulgur  · Eat a variety of vegetables every day  Include dark, leafy greens such as spinach, kale, anastasia greens, and mustard greens  Eat yellow and orange vegetables such as carrots, sweet potatoes, and winter squash  · Eat a variety of fruits every day  Choose fresh or canned fruit (canned in its own juice or light syrup) instead of juice  Fruit juice has very little or no fiber  · Eat low-fat dairy foods  Drink fat-free (skim) milk or 1% milk  Eat fat-free yogurt and low-fat cottage cheese  Try low-fat cheeses such as mozzarella and other reduced-fat cheeses  · Choose meat and other protein foods that are low in fat  Choose beans or other legumes such as split peas or lentils  Choose fish, skinless poultry (chicken or turkey), or lean cuts of red meat (beef or pork)  Before you cook meat or poultry, cut off any visible fat  · Use less fat and oil  Try baking foods instead of frying them  Add less fat, such as margarine, sour cream, regular salad dressing and mayonnaise to foods  Eat fewer high-fat foods  Some examples of high-fat foods include french fries, doughnuts, ice cream, and cakes  · Eat fewer sweets  Limit foods and drinks that are high in sugar  This includes candy, cookies, regular soda, and sweetened drinks  Exercise:  Exercise at least 30 minutes per day on most days of the week  Some examples of exercise include walking, biking, dancing, and swimming  You can also fit in more physical activity by taking the stairs instead of the elevator or parking farther away from stores  Ask your healthcare provider about the best exercise plan for you  © Copyright [employer] 2018 Information is for End User's use only and may not be sold, redistributed or otherwise used for commercial purposes   All illustrations and images included in CareNotes® are the copyrighted property of [employer]  or [address] [place] [address]

## 2022-08-22 DIAGNOSIS — I10 ESSENTIAL HYPERTENSION: ICD-10-CM

## 2022-08-22 RX ORDER — METOPROLOL TARTRATE 50 MG/1
50 TABLET, FILM COATED ORAL 2 TIMES DAILY
Qty: 180 TABLET | Refills: 3 | Status: SHIPPED | OUTPATIENT
Start: 2022-08-22

## 2022-08-30 ENCOUNTER — HOSPITAL ENCOUNTER (OUTPATIENT)
Dept: RADIOLOGY | Age: 82
Discharge: HOME | End: 2022-08-30
Payer: MEDICARE

## 2022-08-30 VITALS — BODY MASS INDEX: 25.76 KG/M2 | WEIGHT: 170 LBS | HEIGHT: 68 IN

## 2022-08-30 DIAGNOSIS — Z12.31 ENCOUNTER FOR SCREENING MAMMOGRAM FOR BREAST CANCER: ICD-10-CM

## 2022-08-30 PROCEDURE — 77063 BREAST TOMOSYNTHESIS BI: CPT

## 2022-08-30 PROCEDURE — 77067 SCR MAMMO BI INCL CAD: CPT

## 2022-09-26 DIAGNOSIS — I10 ESSENTIAL HYPERTENSION: ICD-10-CM

## 2022-09-26 RX ORDER — IRBESARTAN 300 MG/1
300 TABLET ORAL
Qty: 90 TABLET | Refills: 3 | Status: SHIPPED | OUTPATIENT
Start: 2022-09-26

## 2022-12-14 ENCOUNTER — APPOINTMENT (OUTPATIENT)
Dept: LAB | Age: 82
End: 2022-12-14

## 2022-12-14 DIAGNOSIS — Z87.440 HISTORY OF RECURRENT UTIS: ICD-10-CM

## 2022-12-14 LAB
ALBUMIN SERPL BCP-MCNC: 3.5 G/DL (ref 3.5–5)
ALP SERPL-CCNC: 59 U/L (ref 46–116)
ALT SERPL W P-5'-P-CCNC: 24 U/L (ref 12–78)
ANION GAP SERPL CALCULATED.3IONS-SCNC: 5 MMOL/L (ref 4–13)
AST SERPL W P-5'-P-CCNC: 15 U/L (ref 5–45)
BASOPHILS # BLD AUTO: 0.05 THOUSANDS/ÂΜL (ref 0–0.1)
BASOPHILS NFR BLD AUTO: 1 % (ref 0–1)
BILIRUB SERPL-MCNC: 0.7 MG/DL (ref 0.2–1)
BUN SERPL-MCNC: 15 MG/DL (ref 5–25)
CALCIUM SERPL-MCNC: 9.3 MG/DL (ref 8.3–10.1)
CHLORIDE SERPL-SCNC: 105 MMOL/L (ref 96–108)
CO2 SERPL-SCNC: 28 MMOL/L (ref 21–32)
CREAT SERPL-MCNC: 0.89 MG/DL (ref 0.6–1.3)
EOSINOPHIL # BLD AUTO: 0.14 THOUSAND/ÂΜL (ref 0–0.61)
EOSINOPHIL NFR BLD AUTO: 3 % (ref 0–6)
ERYTHROCYTE [DISTWIDTH] IN BLOOD BY AUTOMATED COUNT: 11.9 % (ref 11.6–15.1)
EST. AVERAGE GLUCOSE BLD GHB EST-MCNC: 117 MG/DL
GFR SERPL CREATININE-BSD FRML MDRD: 60 ML/MIN/1.73SQ M
GLUCOSE P FAST SERPL-MCNC: 97 MG/DL (ref 65–99)
HBA1C MFR BLD: 5.7 %
HCT VFR BLD AUTO: 40.3 % (ref 34.8–46.1)
HGB BLD-MCNC: 13 G/DL (ref 11.5–15.4)
IMM GRANULOCYTES # BLD AUTO: 0.01 THOUSAND/UL (ref 0–0.2)
IMM GRANULOCYTES NFR BLD AUTO: 0 % (ref 0–2)
LYMPHOCYTES # BLD AUTO: 1.61 THOUSANDS/ÂΜL (ref 0.6–4.47)
LYMPHOCYTES NFR BLD AUTO: 31 % (ref 14–44)
MCH RBC QN AUTO: 29.8 PG (ref 26.8–34.3)
MCHC RBC AUTO-ENTMCNC: 32.3 G/DL (ref 31.4–37.4)
MCV RBC AUTO: 92 FL (ref 82–98)
MONOCYTES # BLD AUTO: 0.56 THOUSAND/ÂΜL (ref 0.17–1.22)
MONOCYTES NFR BLD AUTO: 11 % (ref 4–12)
NEUTROPHILS # BLD AUTO: 2.77 THOUSANDS/ÂΜL (ref 1.85–7.62)
NEUTS SEG NFR BLD AUTO: 54 % (ref 43–75)
NRBC BLD AUTO-RTO: 0 /100 WBCS
PLATELET # BLD AUTO: 303 THOUSANDS/UL (ref 149–390)
PMV BLD AUTO: 9.8 FL (ref 8.9–12.7)
POTASSIUM SERPL-SCNC: 3.9 MMOL/L (ref 3.5–5.3)
PROT SERPL-MCNC: 7.1 G/DL (ref 6.4–8.4)
RBC # BLD AUTO: 4.36 MILLION/UL (ref 3.81–5.12)
SODIUM SERPL-SCNC: 138 MMOL/L (ref 135–147)
TSH SERPL DL<=0.05 MIU/L-ACNC: 3.55 UIU/ML (ref 0.45–4.5)
WBC # BLD AUTO: 5.14 THOUSAND/UL (ref 4.31–10.16)

## 2023-02-27 ENCOUNTER — APPOINTMENT (OUTPATIENT)
Dept: LAB | Age: 83
End: 2023-02-27

## 2023-02-27 DIAGNOSIS — I10 ESSENTIAL HYPERTENSION, MALIGNANT: ICD-10-CM

## 2023-02-27 DIAGNOSIS — E05.00 TOXIC DIFFUSE GOITER WITH PRETIBIAL MYXEDEMA: ICD-10-CM

## 2023-02-27 DIAGNOSIS — E03.8 TOXIC DIFFUSE GOITER WITH PRETIBIAL MYXEDEMA: ICD-10-CM

## 2023-02-27 DIAGNOSIS — R73.03 DIABETES MELLITUS, LATENT: ICD-10-CM

## 2023-02-27 LAB
ALBUMIN SERPL BCP-MCNC: 3.4 G/DL (ref 3.5–5)
ALP SERPL-CCNC: 54 U/L (ref 46–116)
ALT SERPL W P-5'-P-CCNC: 21 U/L (ref 12–78)
ANION GAP SERPL CALCULATED.3IONS-SCNC: 4 MMOL/L (ref 4–13)
AST SERPL W P-5'-P-CCNC: 14 U/L (ref 5–45)
BASOPHILS # BLD AUTO: 0.03 THOUSANDS/ÂΜL (ref 0–0.1)
BASOPHILS NFR BLD AUTO: 1 % (ref 0–1)
BILIRUB SERPL-MCNC: 0.64 MG/DL (ref 0.2–1)
BUN SERPL-MCNC: 16 MG/DL (ref 5–25)
CALCIUM ALBUM COR SERPL-MCNC: 9.6 MG/DL (ref 8.3–10.1)
CALCIUM SERPL-MCNC: 9.1 MG/DL (ref 8.3–10.1)
CHLORIDE SERPL-SCNC: 104 MMOL/L (ref 96–108)
CO2 SERPL-SCNC: 27 MMOL/L (ref 21–32)
CREAT SERPL-MCNC: 0.87 MG/DL (ref 0.6–1.3)
EOSINOPHIL # BLD AUTO: 0.14 THOUSAND/ÂΜL (ref 0–0.61)
EOSINOPHIL NFR BLD AUTO: 3 % (ref 0–6)
ERYTHROCYTE [DISTWIDTH] IN BLOOD BY AUTOMATED COUNT: 11.7 % (ref 11.6–15.1)
EST. AVERAGE GLUCOSE BLD GHB EST-MCNC: 126 MG/DL
GFR SERPL CREATININE-BSD FRML MDRD: 61 ML/MIN/1.73SQ M
GLUCOSE P FAST SERPL-MCNC: 94 MG/DL (ref 65–99)
HBA1C MFR BLD: 6 %
HCT VFR BLD AUTO: 41.3 % (ref 34.8–46.1)
HGB BLD-MCNC: 13.6 G/DL (ref 11.5–15.4)
IMM GRANULOCYTES # BLD AUTO: 0.01 THOUSAND/UL (ref 0–0.2)
IMM GRANULOCYTES NFR BLD AUTO: 0 % (ref 0–2)
LYMPHOCYTES # BLD AUTO: 1.36 THOUSANDS/ÂΜL (ref 0.6–4.47)
LYMPHOCYTES NFR BLD AUTO: 26 % (ref 14–44)
MCH RBC QN AUTO: 31.1 PG (ref 26.8–34.3)
MCHC RBC AUTO-ENTMCNC: 32.9 G/DL (ref 31.4–37.4)
MCV RBC AUTO: 95 FL (ref 82–98)
MONOCYTES # BLD AUTO: 0.61 THOUSAND/ÂΜL (ref 0.17–1.22)
MONOCYTES NFR BLD AUTO: 12 % (ref 4–12)
NEUTROPHILS # BLD AUTO: 3.13 THOUSANDS/ÂΜL (ref 1.85–7.62)
NEUTS SEG NFR BLD AUTO: 58 % (ref 43–75)
NRBC BLD AUTO-RTO: 0 /100 WBCS
PLATELET # BLD AUTO: 289 THOUSANDS/UL (ref 149–390)
PMV BLD AUTO: 10.4 FL (ref 8.9–12.7)
POTASSIUM SERPL-SCNC: 4.6 MMOL/L (ref 3.5–5.3)
PROT SERPL-MCNC: 6.6 G/DL (ref 6.4–8.4)
RBC # BLD AUTO: 4.37 MILLION/UL (ref 3.81–5.12)
SODIUM SERPL-SCNC: 135 MMOL/L (ref 135–147)
TSH SERPL DL<=0.05 MIU/L-ACNC: 4.19 UIU/ML (ref 0.45–4.5)
WBC # BLD AUTO: 5.28 THOUSAND/UL (ref 4.31–10.16)

## 2023-03-09 ENCOUNTER — OFFICE VISIT (OUTPATIENT)
Dept: FAMILY MEDICINE CLINIC | Facility: CLINIC | Age: 83
End: 2023-03-09

## 2023-03-09 VITALS
SYSTOLIC BLOOD PRESSURE: 138 MMHG | WEIGHT: 168 LBS | OXYGEN SATURATION: 98 % | TEMPERATURE: 97.6 F | HEART RATE: 56 BPM | BODY MASS INDEX: 25.46 KG/M2 | DIASTOLIC BLOOD PRESSURE: 80 MMHG | HEIGHT: 68 IN

## 2023-03-09 DIAGNOSIS — E03.8 SUBCLINICAL HYPOTHYROIDISM: ICD-10-CM

## 2023-03-09 DIAGNOSIS — R73.03 PREDIABETES: ICD-10-CM

## 2023-03-09 DIAGNOSIS — D72.819 LEUKOPENIA, UNSPECIFIED TYPE: ICD-10-CM

## 2023-03-09 DIAGNOSIS — I10 PRIMARY HYPERTENSION: Primary | ICD-10-CM

## 2023-03-09 RX ORDER — CIPROFLOXACIN 500 MG/1
500 TABLET, FILM COATED ORAL 2 TIMES DAILY
COMMUNITY
Start: 2023-01-03

## 2023-03-09 RX ORDER — METHENAMINE HIPPURATE 1000 MG/1
0.5 TABLET ORAL 2 TIMES DAILY
COMMUNITY
Start: 2023-01-18

## 2023-03-09 RX ORDER — CLINDAMYCIN HYDROCHLORIDE 300 MG/1
CAPSULE ORAL
COMMUNITY
Start: 2023-01-30

## 2023-03-09 NOTE — PROGRESS NOTES
Name: Anahi Mora      : 1940      MRN: [id number]  Encounter Provider: Rajni Trujillo MD  Encounter Date: 3/9/2023   Encounter department: [address] [place] [address]     1  Primary hypertension    2  Prediabetes    3  Leukopenia, unspecified type    4  Subclinical hypothyroidism    Continue with current medications  OV 6 months with labs  Subjective     Followup visit  Medications reviewed  Hypertension-blood pressures have been stable on 3 drug regimen- Amlodipine 5 mg AM and 2 5 mg PM, Irbesartan 300 mg daily and Metoprolol 50 mg BID  Past history of hyponatremia and hypokalemia while on HCTZ  Labs 2022  creatinine 0 87  GFR 61  Electrolytes normal   Hgb 13 6  2020 Lipid profile cholesterol 187  TGs 88  HDL 55    LFTs normal  Prediabetes FBS 94  A1c 6 0  + FH type DM      Recent Results (from the past 672 hour(s))   CBC and differential    Collection Time: 23  9:04 AM   Result Value Ref Range    WBC 5 28 4 31 - 10 16 Thousand/uL    RBC 4 37 3 81 - 5 12 Million/uL    Hemoglobin 13 6 11 5 - 15 4 g/dL    Hematocrit 41 3 34 8 - 46 1 %    MCV 95 82 - 98 fL    MCH 31 1 26 8 - 34 3 pg    MCHC 32 9 31 4 - 37 4 g/dL    RDW 11 7 11 6 - 15 1 %    MPV 10 4 8 9 - 12 7 fL    Platelets 609 188 - 190 Thousands/uL    nRBC 0 /100 WBCs    Neutrophils Relative 58 43 - 75 %    Immat GRANS % 0 0 - 2 %    Lymphocytes Relative 26 14 - 44 %    Monocytes Relative 12 4 - 12 %    Eosinophils Relative 3 0 - 6 %    Basophils Relative 1 0 - 1 %    Neutrophils Absolute 3 13 1 85 - 7 62 Thousands/µL    Immature Grans Absolute 0 01 0 00 - 0 20 Thousand/uL    Lymphocytes Absolute 1 36 0 60 - 4 47 Thousands/µL    Monocytes Absolute 0 61 0 17 - 1 22 Thousand/µL    Eosinophils Absolute 0 14 0 00 - 0 61 Thousand/µL    Basophils Absolute 0 03 0 00 - 0 10 Thousands/µL   Comprehensive metabolic panel    Collection Time: 23  9:04 AM   Result Value Ref Range    Sodium 135 135 - 147 mmol/L Potassium 4 6 3 5 - 5 3 mmol/L    Chloride 104 96 - 108 mmol/L    CO2 27 21 - 32 mmol/L    ANION GAP 4 4 - 13 mmol/L    BUN 16 5 - 25 mg/dL    Creatinine 0 87 0 60 - 1 30 mg/dL    Glucose, Fasting 94 65 - 99 mg/dL    Calcium 9 1 8 3 - 10 1 mg/dL    Corrected Calcium 9 6 8 3 - 10 1 mg/dL    AST 14 5 - 45 U/L    ALT 21 12 - 78 U/L    Alkaline Phosphatase 54 46 - 116 U/L    Total Protein 6 6 6 4 - 8 4 g/dL    Albumin 3 4 (L) 3 5 - 5 0 g/dL    Total Bilirubin 0 64 0 20 - 1 00 mg/dL    eGFR 61 ml/min/1 73sq m   Hemoglobin A1C    Collection Time: 02/27/23  9:04 AM   Result Value Ref Range    Hemoglobin A1C 6 0 (H) Normal 3 8-5 6%; PreDiabetic 5 7-6 4%; Diabetic >=6 5%; Glycemic control for adults with diabetes <7 0% %     mg/dl   TSH, 3rd generation with Free T4 reflex    Collection Time: 02/27/23  9:04 AM   Result Value Ref Range    TSH 3RD GENERATON 4 190 0 450 - 4 500 uIU/mL           Review of Systems   Constitutional: Negative for appetite change, chills, fatigue, fever and unexpected weight change  HENT: Negative for congestion, ear pain, hearing loss, rhinorrhea, sore throat and trouble swallowing  Chronic nasal congestion improved with Flonase  Eyes: Negative for visual disturbance  Cataract surgery OU    Respiratory: Negative for cough, shortness of breath and wheezing  Cardiovascular: Negative for chest pain, palpitations and leg swelling  01/2020 admission for substernal chest pain  Patient was initialy seen at urgent care  EKG NSR with frequent PVCs  she was sent to [facility]  Initial EKG NSR with fusion complexes  No acute changes  2nd EKG sinus bradycardia with PACs  troponins x 3 negative  Chest x-ray no active disease  Stress echocardiogram normal   EF 60%  Gastrointestinal: Negative for abdominal pain, blood in stool, constipation, diarrhea, nausea and vomiting  Intermittent reflux symptoms-triggered by certain foods    No dysphagia   Symptoms alleviated with Tums  Chronic constipation  Colonoscopy 03/2019 normal except for diverticulosis  + FH colon CA sister  Endocrine: Negative for polydipsia and polyuria  Subclinical hypothyroidism  TSH 07/2017  thyroid ABs negative  02/2015 DEXA scan normal     Lab Results       Component                Value               Date                       [id number]             4 190               02/27/2023        Lab Results       Component                Value               Date                       [id number]             4 590 (H)           08/04/2022                                      Genitourinary: Negative for difficulty urinating, dysuria and hematuria  History of recurrent UTIs followed by Urology  On Hiprex  02/2019 kidney bladder ultrasound normal minimal PVR  29 ML  Simple cyst left adnexa without significant change  pelvic u/s 08/2016 stable simple left ovarian cyst  01/2017  normal at 9 5 followed by GYN ,    Musculoskeletal: Negative for arthralgias and myalgias  OA right knee s/p steroid injection by ortho with symptom relief  X rays right knee 06/2020  Reviewed  Prior MRI right knee showed  meniscal tear  Skin: Negative for rash  Allergic/Immunologic: Positive for environmental allergies  Neurological: Negative for dizziness and headaches  Hematological: Negative for adenopathy  Does not bruise/bleed easily  Past history of mild leukopenia  06/2018  Vitamin B12 589  Folate greater than 20       Lab Results       Component                Value               Date                       WBC                      5 28                02/27/2023                 HGB                      13 6                02/27/2023                 HCT                      41 3                02/27/2023                 MCV                      95                  02/27/2023                 PLT                      289                 02/27/2023 WBC       Date                     Value               Ref Range           Status                06/29/2021               5 45                4 31 - 10 16 T*     Final                 12/14/2020               4 47                4 31 - 10 16 T*     Final                 05/27/2020               5 07                4 31 - 10 16 T*     Final                 06/11/2015               4 75                4 31 - 10 16 T*     Final                 04/02/2014               5 24                4 31 - 10 16 T*     Final                          Psychiatric/Behavioral: Negative for dysphoric mood and sleep disturbance  The patient is not nervous/anxious          Past Medical History:   Diagnosis Date   • Accelerated essential hypertension     last assessed 08/17/2015   • Depression with anxiety     last assesed 08/16/2012   • Neutropenia ([address] ) 1/8/2020   • Post-menopausal bleeding 12/5/2013   • Strep pharyngitis 8/18/2019     Past Surgical History:   Procedure Laterality Date   • BLEPHAROPLASTY      upper lid w/excessive skin   • CARDIOVASCULAR STRESS TEST      onset June 2005   • COLONOSCOPY     • DILATION AND CURETTAGE, DIAGNOSTIC / THERAPEUTIC     • ENDOMETRIAL BIOPSY      negative for dysplasia, hyperplasia and malignancy, resolved 03/25/2013   • TUBAL LIGATION       Family History   Problem Relation Age of Onset   • Heart disease Father         cardiac disorder   • Skin cancer Father    • Colon cancer Sister 80   • Heart disease Family    • Hypertension Family    • Coronary artery disease Family    • No Known Problems Daughter    • No Known Problems Sister    • No Known Problems Sister    • No Known Problems Daughter    • No Known Problems Daughter    • No Known Problems Maternal Aunt    • No Known Problems Maternal Aunt    • No Known Problems Maternal Aunt    • No Known Problems Paternal Aunt    • No Known Problems Paternal Aunt    • No Known Problems Paternal Aunt    • No Known Problems Mother    • No Known Problems Maternal Grandmother    • No Known Problems Maternal Grandfather    • No Known Problems Paternal Grandmother    • No Known Problems Paternal Grandfather      Social History     Socioeconomic History   • Marital status:      Spouse name: None   • Number of children: None   • Years of education: None   • Highest education level: None   Occupational History   • None   Tobacco Use   • Smoking status: Never   • Smokeless tobacco: Never   Substance and Sexual Activity   • Alcohol use: Yes     Comment: rare   • Drug use: No   • Sexual activity: None   Other Topics Concern   • None   Social History Narrative    Denied history of home environment domestic violence     Social Determinants of Health     Financial Resource Strain: Low Risk    • Difficulty of Paying Living Expenses: Not hard at all   Food Insecurity: Not on file   Transportation Needs: No Transportation Needs   • Lack of Transportation (Medical): No   • Lack of Transportation (Non-Medical):  No   Physical Activity: Not on file   Stress: Not on file   Social Connections: Not on file   Intimate Partner Violence: Not on file   Housing Stability: Not on file     Current Outpatient Medications on File Prior to Visit   Medication Sig   • amLODIPine (NORVASC) 2 5 mg tablet 2 tablets in AM  1 tablet in PM    • Calcium Carb-Cholecalciferol 1000-800 MG-UNIT TABS Take by mouth   • Cholecalciferol 2000 units CAPS Take 1 capsule by mouth   • Cranberry 47336 MG CAPS Take 1 capsule by mouth in the morning   • irbesartan (AVAPRO) 300 mg tablet Take 1 tablet (300 mg total) by mouth daily at bedtime   • ketoconazole (NIZORAL) 2 % shampoo Apply 1 application topically 2 (two) times a week   • methenamine hippurate (HIPREX) 1 g tablet Take 0 5 g by mouth 2 (two) times a day   • metoprolol tartrate (LOPRESSOR) 50 mg tablet Take 1 tablet (50 mg total) by mouth 2 (two) times a day   • multivitamin (THERAGRAN) TABS Take 1 tablet by mouth   • ciprofloxacin (CIPRO) 500 mg tablet Take 500 mg by mouth 2 (two) times a day   • clindamycin (CLEOCIN) 300 MG capsule TAKE 1 CAPSULE EVERY SIX HOURS UNTIL COMPLETED  (Patient not taking: Reported on 3/9/2023)     Allergies   Allergen Reactions   • Penicillins Rash     Immunization History   Administered Date(s) Administered   • INFLUENZA 09/28/2019, 10/05/2020   • Influenza Split High Dose Preservative Free IM 09/18/2013, 09/23/2014, 11/17/2015, 11/30/2016, 12/12/2017, 10/05/2020   • Influenza, high dose seasonal 0 7 mL 11/05/2018, 09/28/2019, 10/04/2021, 11/16/2021, 11/04/2022   • Influenza, seasonal, injectable 1940, 09/10/2012   • Pneumococcal Conjugate 13-Valent 11/17/2015   • Pneumococcal Polysaccharide PPV23 11/02/2006   • Td (adult), adsorbed 12/29/2010       Objective     /80 (BP Location: Left arm, Patient Position: Sitting, Cuff Size: Standard)   Pulse 56   Temp 97 6 °F (36 4 °C)   Ht 5' 7 5" (1 715 m)   Wt 76 2 kg (168 lb)   SpO2 98%   BMI 25 92 kg/m²     BP Readings from Last 3 Encounters:   03/09/23 138/80   08/18/22 132/70   05/27/22 142/70     Wt Readings from Last 3 Encounters:   03/09/23 76 2 kg (168 lb)   08/30/22 77 1 kg (170 lb)   08/18/22 77 6 kg (171 lb)         Physical Exam  Vitals and nursing note reviewed  Constitutional:       General: She is not in acute distress  Appearance: She is well-developed  HENT:      Right Ear: Tympanic membrane and ear canal normal       Left Ear: Tympanic membrane and ear canal normal    Eyes:      General: No scleral icterus  Extraocular Movements: Extraocular movements intact  Conjunctiva/sclera: Conjunctivae normal       Pupils: Pupils are equal, round, and reactive to light  Neck:      Thyroid: No thyroid mass or thyromegaly  Vascular: No carotid bruit or JVD  Trachea: No tracheal deviation  Cardiovascular:      Rate and Rhythm: Normal rate and regular rhythm  Heart sounds: Normal heart sounds  No murmur heard  No gallop  Pulmonary:      Effort: Pulmonary effort is normal  No respiratory distress  Breath sounds: Normal breath sounds  No wheezing or rales  Musculoskeletal:      Right lower leg: No edema  Left lower leg: No edema  Lymphadenopathy:      Cervical: No cervical adenopathy  Upper Body:      Right upper body: No supraclavicular adenopathy  Left upper body: No supraclavicular adenopathy  Skin:     Findings: No rash  Nails: There is no clubbing  Neurological:      General: No focal deficit present  Mental Status: She is alert and oriented to person, place, and time     Psychiatric:         Mood and Affect: Mood normal          Behavior: Behavior normal        Lavell Hong MD

## 2023-03-28 DIAGNOSIS — F40.243 FEAR OF FLYING: Primary | ICD-10-CM

## 2023-03-28 DIAGNOSIS — I10 PRIMARY HYPERTENSION: ICD-10-CM

## 2023-03-28 DIAGNOSIS — I10 ACCELERATED HYPERTENSION: ICD-10-CM

## 2023-03-28 RX ORDER — AMLODIPINE BESYLATE 2.5 MG/1
TABLET ORAL
Qty: 270 TABLET | Refills: 3 | Status: SHIPPED | OUTPATIENT
Start: 2023-03-28

## 2023-03-28 RX ORDER — LORAZEPAM 0.5 MG/1
0.5 TABLET ORAL 2 TIMES DAILY PRN
Qty: 20 TABLET | Refills: 0 | Status: SHIPPED | OUTPATIENT
Start: 2023-03-28

## 2023-03-28 RX ORDER — HYDROCHLOROTHIAZIDE 12.5 MG/1
TABLET ORAL
Qty: 30 TABLET | Refills: 0 | Status: SHIPPED | OUTPATIENT
Start: 2023-03-28

## 2023-03-28 NOTE — TELEPHONE ENCOUNTER
Patient discussed getting medication for upcoming trip   She is actually going to [facility] in 2 weeks & is requesting refill on Lorazepam 0 5 mg 1 pill twice daily PRN #20  & Hydrochlorathiazide 12 5 mg 1 tablet PRN #30  As discussed at recent appointment with Dr Anibal Rajput

## 2023-05-08 ENCOUNTER — OFFICE VISIT (OUTPATIENT)
Dept: FAMILY MEDICINE CLINIC | Facility: CLINIC | Age: 83
End: 2023-05-08

## 2023-05-08 VITALS
TEMPERATURE: 98.1 F | HEIGHT: 68 IN | WEIGHT: 168 LBS | SYSTOLIC BLOOD PRESSURE: 138 MMHG | DIASTOLIC BLOOD PRESSURE: 88 MMHG | RESPIRATION RATE: 16 BRPM | HEART RATE: 55 BPM | BODY MASS INDEX: 25.46 KG/M2 | OXYGEN SATURATION: 96 %

## 2023-05-08 DIAGNOSIS — K62.5 RECTAL BLEEDING: Primary | ICD-10-CM

## 2023-05-08 NOTE — PROGRESS NOTES
Name: Teddy Fish      : 1940      MRN: [id number]  Encounter Provider: Mahesh Maharaj MD  Encounter Date: 2023   Encounter department: [address] [place] [address]  Rectal bleeding    Follow-up with colorectal surgery for persistent symptoms  Try to limit NSAIDs  Subjective     Patient reports intermittent episodes of rectal bleeding blood on tissue with wiping  Symptoms started after she was constipated with excessive straining  No pain at present with bowel movements  Last colonoscopy  showed diverticula otherwise normal   Family history colon cancer sister  Patient had been taking as needed Advil with recent trip to [facility] symptoms started prior to Advil use  Lab Results   Component Value Date    WBC 5 28 2023    HGB 13 6 2023    HCT 41 3 2023    MCV 95 2023     2023         Review of Systems   Constitutional: Negative for appetite change and unexpected weight change  Respiratory: Negative for shortness of breath  Gastrointestinal: Positive for blood in stool and constipation  Negative for abdominal pain, diarrhea, nausea, rectal pain and vomiting  See HPI    Neurological: Negative for dizziness         Past Medical History:   Diagnosis Date   • Accelerated essential hypertension     last assessed 2015   • Depression with anxiety     last assesed 2012   • Neutropenia ([address] ) 2020   • Post-menopausal bleeding 2013   • Strep pharyngitis 2019     Past Surgical History:   Procedure Laterality Date   • BLEPHAROPLASTY      upper lid w/excessive skin   • CARDIOVASCULAR STRESS TEST      onset 2005   • COLONOSCOPY     • DILATION AND CURETTAGE, DIAGNOSTIC / THERAPEUTIC     • ENDOMETRIAL BIOPSY      negative for dysplasia, hyperplasia and malignancy, resolved 2013   • TUBAL LIGATION       Family History   Problem Relation Age of Onset   • Heart disease Father         cardiac disorder   • Skin cancer Father    • Colon cancer Sister 80   • Heart disease Family    • Hypertension Family    • Coronary artery disease Family    • No Known Problems Daughter    • No Known Problems Sister    • No Known Problems Sister    • No Known Problems Daughter    • No Known Problems Daughter    • No Known Problems Maternal Aunt    • No Known Problems Maternal Aunt    • No Known Problems Maternal Aunt    • No Known Problems Paternal Aunt    • No Known Problems Paternal Aunt    • No Known Problems Paternal Aunt    • No Known Problems Mother    • No Known Problems Maternal Grandmother    • No Known Problems Maternal Grandfather    • No Known Problems Paternal Grandmother    • No Known Problems Paternal Grandfather      Social History     Socioeconomic History   • Marital status:      Spouse name: None   • Number of children: None   • Years of education: None   • Highest education level: None   Occupational History   • None   Tobacco Use   • Smoking status: Never   • Smokeless tobacco: Never   Substance and Sexual Activity   • Alcohol use: Yes     Comment: rare   • Drug use: No   • Sexual activity: None   Other Topics Concern   • None   Social History Narrative    Denied history of home environment domestic violence     Social Determinants of Health     Financial Resource Strain: Low Risk    • Difficulty of Paying Living Expenses: Not hard at all   Food Insecurity: Not on file   Transportation Needs: No Transportation Needs   • Lack of Transportation (Medical): No   • Lack of Transportation (Non-Medical):  No   Physical Activity: Not on file   Stress: Not on file   Social Connections: Not on file   Intimate Partner Violence: Not on file   Housing Stability: Not on file     Current Outpatient Medications on File Prior to Visit   Medication Sig   • amLODIPine (NORVASC) 2 5 mg tablet 2 tablets in AM  1 tablet in PM    • Calcium Carb-Cholecalciferol 1000-800 MG-UNIT TABS Take by mouth   • Cranberry 66922 MG "CAPS Take 1 capsule by mouth in the morning   • hydrochlorothiazide (HYDRODIURIL) 12 5 mg tablet 1 tablet daily PRN   • irbesartan (AVAPRO) 300 mg tablet Take 1 tablet (300 mg total) by mouth daily at bedtime   • ketoconazole (NIZORAL) 2 % shampoo Apply 1 application topically 2 (two) times a week   • methenamine hippurate (HIPREX) 1 g tablet Take 0 5 g by mouth 2 (two) times a day   • metoprolol tartrate (LOPRESSOR) 50 mg tablet Take 1 tablet (50 mg total) by mouth 2 (two) times a day   • multivitamin (THERAGRAN) TABS Take 1 tablet by mouth   • LORazepam (ATIVAN) 0 5 mg tablet Take 1 tablet (0 5 mg total) by mouth 2 (two) times a day as needed for anxiety (Patient not taking: Reported on 5/8/2023)   • [DISCONTINUED] Cholecalciferol 2000 units CAPS Take 1 capsule by mouth (Patient not taking: Reported on 5/8/2023)   • [DISCONTINUED] ciprofloxacin (CIPRO) 500 mg tablet Take 500 mg by mouth 2 (two) times a day (Patient not taking: Reported on 5/8/2023)   • [DISCONTINUED] clindamycin (CLEOCIN) 300 MG capsule TAKE 1 CAPSULE EVERY SIX HOURS UNTIL COMPLETED   (Patient not taking: Reported on 3/9/2023)     Allergies   Allergen Reactions   • Penicillins Rash     Immunization History   Administered Date(s) Administered   • INFLUENZA 09/28/2019, 10/05/2020   • Influenza Split High Dose Preservative Free IM 09/18/2013, 09/23/2014, 11/17/2015, 11/30/2016, 12/12/2017, 10/05/2020   • Influenza, high dose seasonal 0 7 mL 11/05/2018, 09/28/2019, 10/04/2021, 11/16/2021, 11/04/2022   • Influenza, seasonal, injectable 1940, 09/10/2012   • Pneumococcal Conjugate 13-Valent 11/17/2015   • Pneumococcal Polysaccharide PPV23 11/02/2006   • Td (adult), adsorbed 12/29/2010       Objective     /88 (BP Location: Left arm, Patient Position: Sitting, Cuff Size: Large)   Pulse 55   Temp 98 1 °F (36 7 °C)   Resp 16   Ht 5' 7 5\" (1 715 m)   Wt 76 2 kg (168 lb)   SpO2 96%   BMI 25 92 kg/m²     Physical Exam  Constitutional:       " General: She is not in acute distress  Cardiovascular:      Rate and Rhythm: Normal rate and regular rhythm  Heart sounds: No murmur heard  No gallop  Pulmonary:      Effort: Pulmonary effort is normal  No respiratory distress  Breath sounds: Normal breath sounds  No wheezing or rales  Abdominal:      General: Bowel sounds are normal  There is no distension  Palpations: Abdomen is soft  There is no hepatomegaly, splenomegaly, mass or pulsatile mass  Tenderness: There is no abdominal tenderness  There is no guarding or rebound  Genitourinary:     Rectum: Normal  No mass, tenderness, anal fissure or external hemorrhoid  Normal anal tone  Neurological:      Mental Status: She is alert         Dolores Maya MD

## 2023-06-14 ENCOUNTER — TELEPHONE (OUTPATIENT)
Age: 83
End: 2023-06-14

## 2023-06-14 NOTE — TELEPHONE ENCOUNTER
Unable to reach patient left message to call the office back  I have to reschedule her appointment due to a surgery TR has in the morning

## 2023-06-27 NOTE — PROGRESS NOTES
Colon and Rectal Surgery   Cherylene Core 79 y o  female MRN [id number]  Encounter: [id number]  06/30/23 4:00 PM            Assessment: Cherylene Core is a 79 y o  female who has rectal bleeding, constipation, and abdominal pain  Plan:   Lower abdominal pain  The patient has a 2-month history of lower abdominal pain associated with constipation and rectal bleeding  She had a colonoscopy 4 years ago and feels well, otherwise  She works out daily  Her sister had colon cancer  Examination is unremarkable  The abdomen is soft and flat  Because of the persistence of the pain, I recommend colonoscopy  This is partly due to her sisters history  A high fiber diet is recommended  I administered a fiber sheet and gave instructions on its use  Supplements were discussed  Exercise is recommended  If, after colonoscopy is unremarkable, the pain persists, imaging could be indicated with a CT scan  Rectal bleeding  This is bright red bleeding on wiping  There is no mixing of the blood with the stool  Colonoscopy is recommended  Constipation due to outlet dysfunction  The patient has a chronic history of constipation  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  She feels pressure in the rectum even after an attempt at a bowel movement, including after a good evacuation  Today's exam is quite unremarkable except for rectal bulging anteriorly during a anoscopy  I do not think this requires any specific surgical therapy but a high-fiber diet was given to her  Colonoscopy will rule out any other source of her symptoms  Subjective     HPI    Cherylene Core is a 79 y o  female who is here today for evaluation of rectal bleeding  The patient notes off and off bright red rectal bleeding when straining during bowel movements,noticeable on the tissue paper upon wiping since 2 months ago as well as intermittent achy lower abdominal pain and tenesmus       Her most recent colonoscopy on 3/7/2019 showed mild diverticulosis of the left side of the colon  Family history of colon cancer in sister       Historical Information   Past Medical History:   Diagnosis Date   • Accelerated essential hypertension     last assessed 08/17/2015   • Depression with anxiety     last assesed 08/16/2012   • Neutropenia ([facility] [address] ) 1/8/2020   • Post-menopausal bleeding 12/5/2013   • Strep pharyngitis 8/18/2019     Past Surgical History:   Procedure Laterality Date   • BLEPHAROPLASTY      upper lid w/excessive skin   • CARDIOVASCULAR STRESS TEST      onset June 2005   • COLONOSCOPY     • DILATION AND CURETTAGE, DIAGNOSTIC / THERAPEUTIC     • ENDOMETRIAL BIOPSY      negative for dysplasia, hyperplasia and malignancy, resolved 03/25/2013   • TUBAL LIGATION         Meds/Allergies       Current Outpatient Medications:   •  amLODIPine (NORVASC) 2 5 mg tablet, 2 tablets in AM  1 tablet in PM , Disp: 270 tablet, Rfl: 3  •  Calcium Carb-Cholecalciferol 1000-800 MG-UNIT TABS, Take by mouth, Disp: , Rfl:   •  Cranberry 11094 MG CAPS, Take 1 capsule by mouth in the morning, Disp: , Rfl:   •  hydrochlorothiazide (HYDRODIURIL) 12 5 mg tablet, 1 tablet daily PRN, Disp: 30 tablet, Rfl: 0  •  irbesartan (AVAPRO) 300 mg tablet, Take 1 tablet (300 mg total) by mouth daily at bedtime, Disp: 90 tablet, Rfl: 3  •  ketoconazole (NIZORAL) 2 % shampoo, Apply 1 application topically 2 (two) times a week, Disp: , Rfl:   •  LORazepam (ATIVAN) 0 5 mg tablet, Take 1 tablet (0 5 mg total) by mouth 2 (two) times a day as needed for anxiety (Patient not taking: Reported on 5/8/2023), Disp: 20 tablet, Rfl: 0  •  methenamine hippurate (HIPREX) 1 g tablet, Take 0 5 g by mouth 2 (two) times a day, Disp: , Rfl:   •  metoprolol tartrate (LOPRESSOR) 50 mg tablet, Take 1 tablet (50 mg total) by mouth 2 (two) times a day, Disp: 180 tablet, Rfl: 3  •  multivitamin (THERAGRAN) TABS, Take 1 tablet by mouth, Disp: , Rfl:   Allergies   Allergen "Reactions   • Penicillins Rash       Social History   Social History     Substance and Sexual Activity   Drug Use No     Social History     Tobacco Use   Smoking Status Never   Smokeless Tobacco Never         Family History   Problem Relation Age of Onset   • Heart disease Father         cardiac disorder   • Skin cancer Father    • Colon cancer Sister 80   • Heart disease Family    • Hypertension Family    • Coronary artery disease Family    • No Known Problems Daughter    • No Known Problems Sister    • No Known Problems Sister    • No Known Problems Daughter    • No Known Problems Daughter    • No Known Problems Maternal Aunt    • No Known Problems Maternal Aunt    • No Known Problems Maternal Aunt    • No Known Problems Paternal Aunt    • No Known Problems Paternal Aunt    • No Known Problems Paternal Aunt    • No Known Problems Mother    • No Known Problems Maternal Grandmother    • No Known Problems Maternal Grandfather    • No Known Problems Paternal Grandmother    • No Known Problems Paternal Grandfather          Review of Systems   Constitutional: Negative  Respiratory: Negative  Cardiovascular: Negative  Gastrointestinal: Positive for abdominal distention, abdominal pain, anal bleeding and constipation  Negative for blood in stool, diarrhea, nausea and rectal pain  Objective   Current Vitals:  Vitals:    06/30/23 1534   Weight: 75 3 kg (166 lb)   Height: 5' 7\" (1 702 m)         Physical Exam  Constitutional:       Appearance: Normal appearance  Cardiovascular:      Rate and Rhythm: Normal rate and regular rhythm  Pulmonary:      Effort: Pulmonary effort is normal       Breath sounds: Normal breath sounds  Abdominal:      General: Abdomen is flat  Palpations: Abdomen is soft  There is no mass  Tenderness: There is no abdominal tenderness  There is no guarding  Genitourinary:     Rectum: Normal    Neurological:      General: No focal deficit present        Mental Status: She is " alert and oriented to person, place, and time

## 2023-06-30 ENCOUNTER — TELEPHONE (OUTPATIENT)
Age: 83
End: 2023-06-30

## 2023-06-30 ENCOUNTER — OFFICE VISIT (OUTPATIENT)
Age: 83
End: 2023-06-30
Payer: MEDICARE

## 2023-06-30 VITALS — BODY MASS INDEX: 26.06 KG/M2 | WEIGHT: 166 LBS | HEIGHT: 67 IN

## 2023-06-30 DIAGNOSIS — K62.5 RECTAL BLEEDING: Primary | ICD-10-CM

## 2023-06-30 DIAGNOSIS — K62.5 RECTAL BLEEDING: ICD-10-CM

## 2023-06-30 DIAGNOSIS — K59.02 CONSTIPATION DUE TO OUTLET DYSFUNCTION: ICD-10-CM

## 2023-06-30 DIAGNOSIS — Z80.0 FAMILY HISTORY OF COLON CANCER: ICD-10-CM

## 2023-06-30 DIAGNOSIS — R10.30 LOWER ABDOMINAL PAIN: Primary | ICD-10-CM

## 2023-06-30 NOTE — ASSESSMENT & PLAN NOTE
This is bright red bleeding on wiping  There is no mixing of the blood with the stool  Colonoscopy is recommended

## 2023-06-30 NOTE — LETTER
Ted Pope  [address] [id number]          FLEXIBLE COLONOSCOPY INSTRUCTIONS  PLEASE NOTE    AS OF JUNE 1, 2014, OUR OFFICE REQUIRES 72 HOURS NOTICE OF CANCELLATION/RESCHEDULE OF A PROCEDURE TO AVOID INCURRING A MISSED APPOINTMENT FEE  Your Colonoscopy Procedure has been scheduled at:  [id number] [facility]  [address] [address] [facility] [phone]    The Date of your Procedure is: Thursday, August 3, 2023     Dr Stephen Johns  will be performing the procedure  Report to the [facility] 45 minutes prior to the procedure  The total time at the facility will be approximately 1 1/2 hours  Please bring to your procedure at [facility]:   1  Insurance Cards and referrals if required by [employer] company   2  Valid Photo ID   3  Power of  Form if required    Use the bowel preparation as directed  Check with your family doctor if you are taking a blood thinner (Coumadin, Plavix, Xarelto, Pradaxa, Gingko biloba, Ginseng, Feverfew, St  Dimitrios's Wort)  We suggest stopping these for 3 days  Special instructions may be needed if you are taking aspirin or any aspirin-containing medication  Check with your family physician  If you are on DIABETIC MEDICATION (tablets or insulin) your doctor may make changes in your preparation  Take all medications usual unless otherwise instructed  Feel free to call the physician's office to answer any questions or address any concerns you have regarding your procedure or preparation  A nurse will be happy to assist you  Because anesthesia is given to you during the procedure, the center requires that you be discharged under supervision of an adult to take you home after the procedure is performed  They will also need to sign as a witness on your discharge instructions  Public Transportation such as VAST, BUS, TAXI is Not Acceptable      It is important you notify our office of any insurance changes prior to your procedure and, if necessary, supply us with referrals from your primary care physician  COLONOSCOPY PREPARATION INSTRUCTIONS    Purchase (prescription not required):  · 238 gram bottle of Miralax® (Glycolax®)  · 4 Dulcolax® (Bisacodyl) Laxative Tablets  · 64 oz  bottle of Gatorade® or your preference of a non-carbonated clear liquid - NOT RED OR PURPLE     One Day Prior to Colonoscopy Procedure  · Nothing to eat the day before your procedure, only clear liquids  · It is important that you drink plenty of clear liquids throughout the day to prevent dehydration  Clear Liquids include:  o Water/Iced Tea/Lemonade/Gatorade®/Black Coffee or tea (no milk or creamers  o Soft drinks: orange, ginger ale, cola, Pepsi®, Sprite®, 7Up®  o Renaldo-Aid® (lemonade or orange flavors only)  o Strained fruit juices without pulp such as apple, white grape, white cranberry  o Jell-O®, lemon, lime or orange (no fruit or toppings)  o Popsicles, [place] Ice (No Ice Cream, sherbets, or fruit bars)  o Chicken or beef bouillon/broth  DO NOT EAT OR DRINK ANYTHING RED OR PURPLE  DO NOT DRINK ANY ALCOHOLIC BEVERAGES  DIABETIC PATIENTS: Consult your physician    At 4:00 pm, take (2) Dulcolax® (Bisacodyl) Laxative Tablets  Swallow the tablets whole with an 8 oz  glass of water  At 8:00 pm, take the additional (2) Dulcolax® (Bisacodyl) Laxative Tablets with 8 oz  of water  The package may direct you not to exceed (2) tablets at any time but for the purpose of this examination, you should take (4) total     Mix the 238 gm of Miralax® in 64 oz  of Gatorade® and shake the solution until the Miralax® is dissolved  You will drink half (32 oz) of this solution this evening, beginning between 4 and 6 o'clock  Drink 8 oz glassfuls at your own pace  It may take several hours to drink the solution  Remember to stay close to toilet facilities      DAY OF COLONOSCOPY PROCEDURE    Five (5) hours before your procedure, drink the other half (32 oz) of the Miralax®/Gatorade® mixture within a two (2) hour period  This may require you to get up very early if you are scheduled for an early procedure  NOTHING IS TO BE TAKEN BY MOUTH 3 HOURS PRIOR TO PROCEDURE  If you use an inhaler, please bring it with you to your procedure

## 2023-06-30 NOTE — ASSESSMENT & PLAN NOTE
The patient has a 2-month history of lower abdominal pain associated with constipation and rectal bleeding  She had a colonoscopy 4 years ago and feels well, otherwise  She works out daily  Her sister had colon cancer  Examination is unremarkable  The abdomen is soft and flat  Because of the persistence of the pain, I recommend colonoscopy  This is partly due to her sisters history  A high fiber diet is recommended  I administered a fiber sheet and gave instructions on its use  Supplements were discussed  Exercise is recommended  If, after colonoscopy is unremarkable, the pain persists, imaging could be indicated with a CT scan

## 2023-06-30 NOTE — ASSESSMENT & PLAN NOTE
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

## 2023-06-30 NOTE — TELEPHONE ENCOUNTER
6/30/23 OV TR rectal bleeding, constipation, and abdominal pain, BMI 26    8/3/23 BEC TR papers given to patient

## 2023-06-30 NOTE — PROGRESS NOTES
Lower Endoscopy    Date/Time: 6/30/2023 3:45 PM    Performed by: Muriel Loredo MD  Authorized by: Muriel Loredo MD    Verbal consent obtained?: Yes    Consent given by:  Patient  Scope type: Anoscope   There is no abnormality except for some evidence of anterior rectal wall prolapse  I did not see isaac intussusception but this may be present but early  No other findings are present

## 2023-07-05 ENCOUNTER — TELEPHONE (OUTPATIENT)
Age: 83
End: 2023-07-05

## 2023-07-05 NOTE — TELEPHONE ENCOUNTER
Pt called with two questions. First being in relation to medicare covering her colonoscopy. I explained that as the procedure nears, we have a team that reviews and will notify pt if procedure is not covered. Pt also explained that she used to have abdominal pain but that has subsided now. She mentions having mild intermittent lower right abdominal pain. Pt mentions having arthritis in that area. Pt also mentions being instructed to check with OB. I instructed pt to utilize heat or cold compresses. I reviewed use of tylenol. I recommended pt reach out to OB. Pt verbalized understanding,.

## 2023-07-06 ENCOUNTER — OFFICE VISIT (OUTPATIENT)
Dept: OBGYN CLINIC | Facility: CLINIC | Age: 83
End: 2023-07-06
Payer: MEDICARE

## 2023-07-06 VITALS
WEIGHT: 168 LBS | HEIGHT: 67 IN | SYSTOLIC BLOOD PRESSURE: 140 MMHG | DIASTOLIC BLOOD PRESSURE: 78 MMHG | BODY MASS INDEX: 26.37 KG/M2

## 2023-07-06 DIAGNOSIS — R10.31 GROIN PAIN, RIGHT: Primary | ICD-10-CM

## 2023-07-06 PROCEDURE — 99214 OFFICE O/P EST MOD 30 MIN: CPT | Performed by: OBSTETRICS & GYNECOLOGY

## 2023-07-06 NOTE — PROGRESS NOTES
Assessment/Plan:    No problem-specific Assessment & Plan notes found for this encounter. Diagnoses and all orders for this visit:    Groin pain, right          Subjective:      Patient ID: Smitha Barber is a 79 y.o. female. [address] [facility] is an 79year old postmenopausal female presenting to the office for right sided pelvic pain. Patient reports pain started 2 weeks ago after a constipation work-up where patient endorsed blood on toilet paper but no hemorrhoids on exam. She has a colonoscopy scheduled for Aug 3, 2023 for further work-up. She states the sharp, shooting pain that comes and goes with both activity and at rest. Patient admits to recent excessive activity walking 9+ miles daily on vacation last month. 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 She has a very active lifestyle. She has not taken any medications for the pain. She denies any vaginal bleeding, abnormal discharge, urinary symptoms, nausea, vomiting, abdominal pain, changes to gait. Total time of today's visit was 25 minutes of which greater than 50% was spent face-to-face counseling the patient as well as coordination of care, review of chart laboratory values, physical examination as well as computer entry into the [facility] medical record system. The following portions of the patient's history were reviewed and updated as appropriate: allergies, current medications, past family history, past medical history, past social history, past surgical history and problem list.    Review of Systems   Constitutional: Negative. Negative for appetite change, diaphoresis, fatigue, fever and unexpected weight change. HENT: Negative. Eyes: Negative. Respiratory: Negative. Cardiovascular: Negative. Gastrointestinal: Negative. Negative for abdominal pain, blood in stool, constipation, diarrhea, nausea and vomiting. Endocrine: Negative. Negative for cold intolerance and heat intolerance.    Genitourinary: Positive for pelvic pain. Negative for difficulty urinating, dysuria, frequency, hematuria, urgency, vaginal bleeding, vaginal discharge and vaginal pain. Musculoskeletal: Positive for arthralgias and back pain. Skin: Negative. Allergic/Immunologic: Negative. Neurological: Negative. Hematological: Negative. Negative for adenopathy. Psychiatric/Behavioral: Negative. Objective:      /78   Ht 5' 7" (1.702 m)   Wt 76.2 kg (168 lb)   BMI 26.31 kg/m²          Physical Exam  Constitutional:       General: She is not in acute distress. Appearance: Normal appearance. She is well-developed. She is not diaphoretic. HENT:      Head: Normocephalic and atraumatic. Eyes:      Pupils: Pupils are equal, round, and reactive to light. Cardiovascular:      Rate and Rhythm: Normal rate and regular rhythm. Heart sounds: Normal heart sounds. No murmur heard. No friction rub. No gallop. Pulmonary:      Effort: Pulmonary effort is normal.      Breath sounds: Normal breath sounds. Chest:   Breasts:     Breasts are symmetrical.      Right: No inverted nipple, mass, nipple discharge, skin change or tenderness. Left: No inverted nipple, mass, nipple discharge, skin change or tenderness. Abdominal:      General: Bowel sounds are normal.      Palpations: Abdomen is soft. There is no mass. Tenderness: There is no abdominal tenderness. There is no right CVA tenderness or left CVA tenderness. Hernia: No hernia is present. There is no hernia in the left inguinal area or right inguinal area. Genitourinary:     Exam position: Supine. Labia:         Right: No rash or lesion. Left: No rash or lesion. Vagina: Normal. No vaginal discharge, erythema, tenderness or bleeding. Cervix: No discharge or friability. Uterus: Not enlarged and not tender. Adnexa:         Right: No mass, tenderness or fullness. Left: No mass, tenderness or fullness.      Musculoskeletal: General: Normal range of motion. Cervical back: Normal range of motion and neck supple. Right hip: No tenderness. Normal range of motion. Normal strength. Left hip: No tenderness. Normal range of motion. Normal strength. Lymphadenopathy:      Cervical: No cervical adenopathy. Upper Body:      Right upper body: No supraclavicular adenopathy. Left upper body: No supraclavicular adenopathy. Skin:     General: Skin is warm and dry. Findings: No rash. Neurological:      Mental Status: She is alert and oriented to person, place, and time. Psychiatric:         Speech: Speech normal.         Behavior: Behavior normal.         Thought Content:  Thought content normal.         Judgment: Judgment normal.

## 2023-07-06 NOTE — PATIENT INSTRUCTIONS
Topic: Right groin pain    Patient complains of intermittent shooting pain in the right lower quadrant. She also has history of hip arthritis as well. We will obtain a pelvic ultrasound for evaluation at this time.   If results are unremarkable, will refer to primary physician for work-up of hip    All questions answered for her during today's visit    We will see her back for an annual gynecologic and medical exam in the near future    Patient to call for any problems, questions, issues or concerns which may arise for her

## 2023-07-10 ENCOUNTER — ULTRASOUND (OUTPATIENT)
Dept: OBGYN CLINIC | Facility: CLINIC | Age: 83
End: 2023-07-10
Payer: MEDICARE

## 2023-07-10 DIAGNOSIS — N83.202 CYST OF LEFT OVARY: Primary | ICD-10-CM

## 2023-07-10 DIAGNOSIS — R10.2 PELVIC PAIN: ICD-10-CM

## 2023-07-10 PROCEDURE — 76856 US EXAM PELVIC COMPLETE: CPT | Performed by: OBSTETRICS & GYNECOLOGY

## 2023-07-10 NOTE — PROGRESS NOTES
AMB US Pelvic Non OB    Date/Time: 7/10/2023 10:15 AM    Performed by: Lucita Meier  Authorized by: Awilda Lopez MD    Procedure details:     Indications: ovarian cysts and non-obstetric abdominal pain      Technique:  US Pelvic, Non-OB with complete exam  Uterine findings:     Length (cm): 7.3    Height (cm):  4.2    Width (cm):  2.8    Uterine adhesions: not identified      Adnexal mass: identified      Location:  Mass left    Polyps: not identified      Myomas: not identified      Endometrial stripe: identified      Endometrial hyperplasia: not identified      Endometrium thickness (mm):  4  Left ovary findings:     Left ovary:  Visualized    Cysts: identified      Length (cm): 4.9    Height (cm): 5.2    Width (cm): 5.2    Flow:  Absent  Right ovary findings:     Right ovary:  Visualized    Cysts: not identified      Length (cm): 3    Height (cm): 2.4    Width (cm): 1.7  Other findings:     Free pelvic fluid: not identified      Free peritoneal fluid: not identified    Post-Procedure Details:     Impression:  Endo-4mm  LT Cyst=51 x 51 x 48 mm, with no doppler flow    Tolerance: Tolerated well, no immediate complications  Additional Procedure Comments:          Dr. Destinee Aguiar.  MD  [address]  [address] [facility] [place], [address]  [phone]

## 2023-07-11 NOTE — PROGRESS NOTES
Pelvic ultrasound results reviewed and note is made of a chronic left ovarian cyst.    It is stayed relatively stable over several years    No solid components and no Doppler flow seen on exam    We will see her back in 3 months to check for stability    Patient to call for any problems, questions, issues or concerns which arise for her

## 2023-07-20 ENCOUNTER — NURSE TRIAGE (OUTPATIENT)
Age: 83
End: 2023-07-20

## 2023-07-20 DIAGNOSIS — I10 ESSENTIAL HYPERTENSION: ICD-10-CM

## 2023-07-20 RX ORDER — IRBESARTAN 300 MG/1
300 TABLET ORAL
Qty: 90 TABLET | Refills: 3 | Status: SHIPPED | OUTPATIENT
Start: 2023-07-20

## 2023-07-20 NOTE — TELEPHONE ENCOUNTER
the patient called she is feeling better and wanted to know if she should still move forward with colonoscopy. I advised based on Dr. Ellen Bernal office note from 6/30 she was having symptoms of rectal bleeding, change in bowel habits with constipation and abdominal pain which would warrant colonoscopy. Patient notes she has a sister that had colon cancer and she is going to proceed with colonoscopy.

## 2023-08-28 DIAGNOSIS — I10 ESSENTIAL HYPERTENSION: ICD-10-CM

## 2023-08-28 RX ORDER — METOPROLOL TARTRATE 50 MG/1
50 TABLET, FILM COATED ORAL 2 TIMES DAILY
Qty: 180 TABLET | Refills: 3 | Status: SHIPPED | OUTPATIENT
Start: 2023-08-28

## 2023-08-28 NOTE — TELEPHONE ENCOUNTER
Patient left voicemail stating, " I have some very painful burning itching on the base of my hairline at the back of my neck. And this has been going on for several days now and I don't know if it's shingles or what."      Please advise.

## 2023-09-26 ENCOUNTER — APPOINTMENT (OUTPATIENT)
Dept: LAB | Age: 83
End: 2023-09-26
Payer: MEDICARE

## 2023-09-26 LAB
ALBUMIN SERPL BCP-MCNC: 3.8 G/DL (ref 3.5–5)
ALP SERPL-CCNC: 47 U/L (ref 34–104)
ALT SERPL W P-5'-P-CCNC: 13 U/L (ref 7–52)
ANION GAP SERPL CALCULATED.3IONS-SCNC: 5 MMOL/L
AST SERPL W P-5'-P-CCNC: 15 U/L (ref 13–39)
BASOPHILS # BLD AUTO: 0.05 THOUSANDS/ÂΜL (ref 0–0.1)
BASOPHILS NFR BLD AUTO: 1 % (ref 0–1)
BILIRUB SERPL-MCNC: 0.73 MG/DL (ref 0.2–1)
BUN SERPL-MCNC: 18 MG/DL (ref 5–25)
CALCIUM SERPL-MCNC: 8.8 MG/DL (ref 8.4–10.2)
CHLORIDE SERPL-SCNC: 102 MMOL/L (ref 96–108)
CO2 SERPL-SCNC: 27 MMOL/L (ref 21–32)
CREAT SERPL-MCNC: 0.79 MG/DL (ref 0.6–1.3)
EOSINOPHIL # BLD AUTO: 0.18 THOUSAND/ÂΜL (ref 0–0.61)
EOSINOPHIL NFR BLD AUTO: 4 % (ref 0–6)
ERYTHROCYTE [DISTWIDTH] IN BLOOD BY AUTOMATED COUNT: 11.8 % (ref 11.6–15.1)
EST. AVERAGE GLUCOSE BLD GHB EST-MCNC: 128 MG/DL
GFR SERPL CREATININE-BSD FRML MDRD: 68 ML/MIN/1.73SQ M
GLUCOSE P FAST SERPL-MCNC: 86 MG/DL (ref 65–99)
HBA1C MFR BLD: 6.1 %
HCT VFR BLD AUTO: 40.5 % (ref 34.8–46.1)
HGB BLD-MCNC: 13.1 G/DL (ref 11.5–15.4)
IMM GRANULOCYTES # BLD AUTO: 0.01 THOUSAND/UL (ref 0–0.2)
IMM GRANULOCYTES NFR BLD AUTO: 0 % (ref 0–2)
LYMPHOCYTES # BLD AUTO: 1.54 THOUSANDS/ÂΜL (ref 0.6–4.47)
LYMPHOCYTES NFR BLD AUTO: 32 % (ref 14–44)
MCH RBC QN AUTO: 30 PG (ref 26.8–34.3)
MCHC RBC AUTO-ENTMCNC: 32.3 G/DL (ref 31.4–37.4)
MCV RBC AUTO: 93 FL (ref 82–98)
MONOCYTES # BLD AUTO: 0.62 THOUSAND/ÂΜL (ref 0.17–1.22)
MONOCYTES NFR BLD AUTO: 13 % (ref 4–12)
NEUTROPHILS # BLD AUTO: 2.4 THOUSANDS/ÂΜL (ref 1.85–7.62)
NEUTS SEG NFR BLD AUTO: 50 % (ref 43–75)
NRBC BLD AUTO-RTO: 0 /100 WBCS
PLATELET # BLD AUTO: 280 THOUSANDS/UL (ref 149–390)
PMV BLD AUTO: 10.1 FL (ref 8.9–12.7)
POTASSIUM SERPL-SCNC: 3.9 MMOL/L (ref 3.5–5.3)
PROT SERPL-MCNC: 6.4 G/DL (ref 6.4–8.4)
RBC # BLD AUTO: 4.37 MILLION/UL (ref 3.81–5.12)
SODIUM SERPL-SCNC: 134 MMOL/L (ref 135–147)
WBC # BLD AUTO: 4.8 THOUSAND/UL (ref 4.31–10.16)

## 2023-10-12 NOTE — PROGRESS NOTES
Name: Gian Velarde      : 1940      MRN: [id number]  Encounter Provider: Santiago Jordan MD  Encounter Date: 10/13/2023   Encounter department: [address] [place] [address]     1. Primary hypertension  -     Comprehensive metabolic panel  -     CBC and differential    2. Prediabetes  -     Hemoglobin A1C    3. Subclinical hypothyroidism  -     TSH, 3rd generation with Free T4 reflex    4. Leukopenia, unspecified type    5. Myofascial pain syndrome  -     Trigger Point Injection  -     sarapin injection 2 mL    6. Medicare annual wellness visit, subsequent    7. Encounter for immunization  -     influenza vaccine, high-dose, PF 0.7 mL (FLUZONE HIGH-DOSE)    Continue with current medications. Watch diet. Office visit 6 months with repeat labs. Flu vaccine today. A significant but separately identifiable additional service rendered during the encounter-trigger point injection see procedure note. 1         Subjective     Followup visit. Medications reviewed. Hypertension-blood pressures have stable on Amlodipine 5 mg AM and 2.5 mg PM, Irbesartan 300 mg daily and Metoprolol 50 mg BID. Past history of hyponatremia and hypokalemia while on HCTZ. Labs 2023  creatinine 0.79. GFR 68. Electrolytes normal except for Na+ 134. Hgb 13.1. Prediabetes FBS 86. A1c 6.1. + FH type DM. 2020 Lipid profile cholesterol 187. TGs 88. HDL 55. . LFTs normal. Prediabetes FBS 86. A1c 6.1. + FH type DM.       Recent Results (from the past 672 hour(s))   Comprehensive metabolic panel    Collection Time: 23  8:44 AM   Result Value Ref Range    Sodium 134 (L) 135 - 147 mmol/L    Potassium 3.9 3.5 - 5.3 mmol/L    Chloride 102 96 - 108 mmol/L    CO2 27 21 - 32 mmol/L    ANION GAP 5 mmol/L    BUN 18 5 - 25 mg/dL    Creatinine 0.79 0.60 - 1.30 mg/dL    Glucose, Fasting 86 65 - 99 mg/dL    Calcium 8.8 8.4 - 10.2 mg/dL    AST 15 13 - 39 U/L    ALT 13 7 - 52 U/L    Alkaline Phosphatase 47 34 - 104 U/L    Total Protein 6.4 6.4 - 8.4 g/dL    Albumin 3.8 3.5 - 5.0 g/dL    Total Bilirubin 0.73 0.20 - 1.00 mg/dL    eGFR 68 ml/min/1.73sq m   CBC and differential    Collection Time: 09/26/23  8:44 AM   Result Value Ref Range    WBC 4.80 4.31 - 10.16 Thousand/uL    RBC 4.37 3.81 - 5.12 Million/uL    Hemoglobin 13.1 11.5 - 15.4 g/dL    Hematocrit 40.5 34.8 - 46.1 %    MCV 93 82 - 98 fL    MCH 30.0 26.8 - 34.3 pg    MCHC 32.3 31.4 - 37.4 g/dL    RDW 11.8 11.6 - 15.1 %    MPV 10.1 8.9 - 12.7 fL    Platelets 818 262 - 600 Thousands/uL    nRBC 0 /100 WBCs    Neutrophils Relative 50 43 - 75 %    Immat GRANS % 0 0 - 2 %    Lymphocytes Relative 32 14 - 44 %    Monocytes Relative 13 (H) 4 - 12 %    Eosinophils Relative 4 0 - 6 %    Basophils Relative 1 0 - 1 %    Neutrophils Absolute 2.40 1.85 - 7.62 Thousands/µL    Immature Grans Absolute 0.01 0.00 - 0.20 Thousand/uL    Lymphocytes Absolute 1.54 0.60 - 4.47 Thousands/µL    Monocytes Absolute 0.62 0.17 - 1.22 Thousand/µL    Eosinophils Absolute 0.18 0.00 - 0.61 Thousand/µL    Basophils Absolute 0.05 0.00 - 0.10 Thousands/µL   Hemoglobin A1C    Collection Time: 09/26/23  8:44 AM   Result Value Ref Range    Hemoglobin A1C 6.1 (H) Normal 4.0-5.6%; PreDiabetic 5.7-6.4%; Diabetic >=6.5%; Glycemic control for adults with diabetes <7.0% %     mg/dl           Review of Systems   Constitutional:  Negative for appetite change, chills, fatigue, fever and unexpected weight change. HENT:  Negative for congestion, ear pain, hearing loss, rhinorrhea, sore throat and trouble swallowing. Chronic nasal congestion improved with Flonase. Eyes:  Negative for visual disturbance. Cataract surgery OU    Respiratory:  Negative for cough, shortness of breath and wheezing. Cardiovascular:  Negative for chest pain, palpitations and leg swelling. 01/2020 admission for substernal chest pain. EKG NSR with frequent PVCs. she was sent to [facility].  Initial EKG NSR with fusion complexes. No acute changes. 2nd EKG sinus bradycardia with PACs. troponins x 3 negative. Chest x-ray no active disease. Stress echocardiogram normal.  EF 60%. Gastrointestinal:  Negative for abdominal pain, blood in stool, constipation, diarrhea, nausea and vomiting. Intermittent reflux symptoms-triggered by certain foods. . No dysphagia. Symptoms alleviated with Tums. Chronic constipation. Colonoscopy 03/2019 normal except for diverticulosis. + FH colon CA sister. Endocrine: Negative for polydipsia and polyuria. Subclinical hypothyroidism  TSH 07/2017  thyroid ABs negative. 02/2015 DEXA scan normal.    Lab Results       Component                Value               Date                       [id number]             4.190               02/27/2023        Lab Results       Component                Value               Date                       [id number]             4.590 (H)           08/04/2022                                      Genitourinary:  Negative for difficulty urinating, dysuria and hematuria. History of recurrent UTIs followed by Urology. On Hiprex. 02/2019 kidney bladder ultrasound normal minimal PVR. 29 ML. Simple cyst left adnexa without significant change. pelvic u/s 08/2016 stable simple left ovarian cyst. 01/2017  normal at 9.5 followed by GYN.,    Musculoskeletal:  Positive for back pain. Negative for arthralgias and myalgias. Recurrent pain right lower back. No radiation to right leg. No right leg weakness or numbness. No new bowel or bladder changes. No trauma or injury. She is using infrequent as needed Advil. No nighttime pain   Skin:  Negative for rash. Allergic/Immunologic: Positive for environmental allergies. Neurological:  Negative for dizziness and headaches. Hematological:  Negative for adenopathy. Does not bruise/bleed easily. Past history of mild leukopenia  06/2018  Vitamin B12 589.   Folate greater than 20.     Lab Results       Component                Value               Date                       WBC                      4.80                09/26/2023                 HGB                      13.1                09/26/2023                 HCT                      40.5                09/26/2023                 MCV                      93                  09/26/2023                 PLT                      280                 09/26/2023                                                 WBC       Date                     Value               Ref Range           Status                06/29/2021               5.45                4.31 - 10.16 T*     Final                 12/14/2020               4.47                4.31 - 10.16 T*     Final                 05/27/2020               5.07                4.31 - 10.16 T*     Final                 06/11/2015               4.75                4.31 - 10.16 T*     Final                 04/02/2014               5.24                4.31 - 10.16 T*     Final                          Psychiatric/Behavioral:  Negative for dysphoric mood and sleep disturbance. The patient is not nervous/anxious.         Past Medical History:   Diagnosis Date    Accelerated essential hypertension     last assessed 08/17/2015    Depression with anxiety     last assesed 08/16/2012    Neutropenia ([address]) 1/8/2020    Post-menopausal bleeding 12/5/2013    Strep pharyngitis 8/18/2019     Past Surgical History:   Procedure Laterality Date    BLEPHAROPLASTY      upper lid w/excessive skin    CARDIOVASCULAR STRESS TEST      onset June 2005    COLONOSCOPY      DILATION AND CURETTAGE, DIAGNOSTIC / THERAPEUTIC      ENDOMETRIAL BIOPSY      negative for dysplasia, hyperplasia and malignancy, resolved 03/25/2013    TUBAL LIGATION       Family History   Problem Relation Age of Onset    Heart disease Father         cardiac disorder    Skin cancer Father     Colon cancer Sister 80    Heart disease Family     Hypertension Family     Coronary artery disease Family     No Known Problems Daughter     No Known Problems Sister     No Known Problems Sister     No Known Problems Daughter     No Known Problems Daughter     No Known Problems Maternal Aunt     No Known Problems Maternal Aunt     No Known Problems Maternal Aunt     No Known Problems Paternal Aunt     No Known Problems Paternal Aunt     No Known Problems Paternal Aunt     No Known Problems Mother     No Known Problems Maternal Grandmother     No Known Problems Maternal Grandfather     No Known Problems Paternal Grandmother     No Known Problems Paternal Grandfather      Social History     Socioeconomic History    Marital status:      Spouse name: None    Number of children: None    Years of education: None    Highest education level: None   Occupational History    None   Tobacco Use    Smoking status: Never    Smokeless tobacco: Never   Substance and Sexual Activity    Alcohol use: Yes     Comment: rare    Drug use: No    Sexual activity: None   Other Topics Concern    None   Social History Narrative    Denied history of home environment domestic violence     Social Determinants of Health     Financial Resource Strain: Low Risk  (10/13/2023)    Overall Financial Resource Strain (CARDIA)     Difficulty of Paying Living Expenses: Not hard at all   Food Insecurity: Not on file   Transportation Needs: No Transportation Needs (10/13/2023)    PRAPARE - Transportation     Lack of Transportation (Medical): No     Lack of Transportation (Non-Medical):  No   Physical Activity: Not on file   Stress: Not on file   Social Connections: Not on file   Intimate Partner Violence: Not on file   Housing Stability: Not on file     Current Outpatient Medications on File Prior to Visit   Medication Sig    amLODIPine (NORVASC) 2.5 mg tablet 2 tablets in AM. 1 tablet in PM.    Calcium Carb-Cholecalciferol 1000-800 MG-UNIT TABS Take by mouth    Cranberry 99126 MG CAPS Take 1 capsule by mouth in the morning    irbesartan (AVAPRO) 300 mg tablet TAKE 1 TABLET BY MOUTH DAILY AT BEDTIME    ketoconazole (NIZORAL) 2 % shampoo Apply 1 application topically 2 (two) times a week    methenamine hippurate (HIPREX) 1 g tablet Take 0.5 g by mouth 2 (two) times a day    metoprolol tartrate (LOPRESSOR) 50 mg tablet Take 1 tablet (50 mg total) by mouth 2 (two) times a day    multivitamin (THERAGRAN) TABS Take 1 tablet by mouth    [DISCONTINUED] hydrochlorothiazide (HYDRODIURIL) 12.5 mg tablet 1 tablet daily PRN    ciprofloxacin (CIPRO) 500 mg tablet Take 500 mg by mouth 2 (two) times a day (Patient not taking: Reported on 10/13/2023)    LORazepam (ATIVAN) 0.5 mg tablet Take 1 tablet (0.5 mg total) by mouth 2 (two) times a day as needed for anxiety (Patient not taking: Reported on 5/8/2023)     Allergies   Allergen Reactions    Penicillins Rash     Immunization History   Administered Date(s) Administered    COVID-19 MODERNA VACC 0.5 ML IM 02/03/2021, 03/03/2021, 11/02/2021, 07/15/2022    INFLUENZA 09/28/2019, 10/05/2020    Influenza Split High Dose Preservative Free IM 09/18/2013, 09/23/2014, 11/17/2015, 11/30/2016, 12/12/2017, 10/05/2020    Influenza, high dose seasonal 0.7 mL 11/05/2018, 09/28/2019, 10/04/2021, 11/16/2021, 11/04/2022, 10/13/2023    Influenza, seasonal, injectable 1940, 09/10/2012    Pneumococcal Conjugate 13-Valent 11/17/2015    Pneumococcal Polysaccharide PPV23 11/02/2006    Td (adult), adsorbed 12/29/2010       Objective     /70 (BP Location: Left arm, Patient Position: Sitting, Cuff Size: Standard)   Pulse (!) 47   Temp (!) 97.4 °F (36.3 °C)   Resp 16   Ht 5' 7" (1.702 m)   Wt 76 kg (167 lb 8 oz)   SpO2 98%   BMI 26.23 kg/m²      BP Readings from Last 3 Encounters:   10/13/23 132/70   07/06/23 140/78   05/08/23 138/88      Wt Readings from Last 3 Encounters:   10/13/23 76 kg (167 lb 8 oz)   07/06/23 76.2 kg (168 lb)   06/30/23 75.3 kg (166 lb)          Physical Exam  Vitals and nursing note reviewed. Constitutional:       General: She is not in acute distress. Appearance: She is well-developed. HENT:      Right Ear: Tympanic membrane and ear canal normal.      Left Ear: Tympanic membrane and ear canal normal.   Eyes:      General: No scleral icterus. Extraocular Movements: Extraocular movements intact. Conjunctiva/sclera: Conjunctivae normal.      Pupils: Pupils are equal, round, and reactive to light. Neck:      Thyroid: No thyroid mass or thyromegaly. Vascular: No carotid bruit or JVD. Trachea: No tracheal deviation. Cardiovascular:      Rate and Rhythm: Normal rate and regular rhythm. Heart sounds: Normal heart sounds. No murmur heard. No gallop. Pulmonary:      Effort: Pulmonary effort is normal. No respiratory distress. Breath sounds: Normal breath sounds. No wheezing or rales. Abdominal:      General: Bowel sounds are normal. There is no distension or abdominal bruit. Palpations: Abdomen is soft. There is no hepatomegaly, splenomegaly or mass. Tenderness: There is no abdominal tenderness. There is no guarding or rebound. Musculoskeletal:      Lumbar back: Tenderness present. Normal range of motion. Negative right straight leg raise test and negative left straight leg raise test.        Back:       Right lower leg: No edema. Left lower leg: No edema. Comments: Trigger point tender area localized to right SI region   Lymphadenopathy:      Cervical: No cervical adenopathy. Upper Body:      Right upper body: No supraclavicular adenopathy. Left upper body: No supraclavicular adenopathy. Skin:     Findings: No rash. Nails: There is no clubbing. Neurological:      General: No focal deficit present. Mental Status: She is alert and oriented to person, place, and time.    Psychiatric:         Mood and Affect: Mood normal.         Behavior: Behavior normal.        Universal Protocol:  Consent: Verbal consent obtained. Risks and benefits: risks, benefits and alternatives were discussed  Consent given by: patient  Supporting Documentation  Indications: pain (Myofascial pain syndrome)   Procedure Details  Location(s):    Hip/Pelvis: R sacroiliac     Prep: patient was prepped and draped in usual sterile fashion  Needle size: 32 G  Patient tolerance: patient tolerated the procedure well with no immediate complications  Additional procedure details: Using aseptic technique I injected 1 trigger point.   Sarapin to mL and Lidocaine 1% 1 mL         Jackie Mccarty MD

## 2023-10-13 ENCOUNTER — OFFICE VISIT (OUTPATIENT)
Dept: FAMILY MEDICINE CLINIC | Facility: CLINIC | Age: 83
End: 2023-10-13
Payer: MEDICARE

## 2023-10-13 VITALS
HEIGHT: 67 IN | WEIGHT: 167.5 LBS | BODY MASS INDEX: 26.29 KG/M2 | OXYGEN SATURATION: 98 % | DIASTOLIC BLOOD PRESSURE: 70 MMHG | TEMPERATURE: 97.4 F | RESPIRATION RATE: 16 BRPM | SYSTOLIC BLOOD PRESSURE: 132 MMHG | HEART RATE: 47 BPM

## 2023-10-13 DIAGNOSIS — Z00.00 MEDICARE ANNUAL WELLNESS VISIT, SUBSEQUENT: ICD-10-CM

## 2023-10-13 DIAGNOSIS — I10 PRIMARY HYPERTENSION: Primary | ICD-10-CM

## 2023-10-13 DIAGNOSIS — E03.8 SUBCLINICAL HYPOTHYROIDISM: ICD-10-CM

## 2023-10-13 DIAGNOSIS — D72.819 LEUKOPENIA, UNSPECIFIED TYPE: ICD-10-CM

## 2023-10-13 DIAGNOSIS — R73.03 PREDIABETES: ICD-10-CM

## 2023-10-13 DIAGNOSIS — M79.18 MYOFASCIAL PAIN SYNDROME: ICD-10-CM

## 2023-10-13 DIAGNOSIS — Z23 ENCOUNTER FOR IMMUNIZATION: ICD-10-CM

## 2023-10-13 PROBLEM — R10.30 LOWER ABDOMINAL PAIN: Status: RESOLVED | Noted: 2019-01-28 | Resolved: 2023-10-13

## 2023-10-13 PROBLEM — K62.5 RECTAL BLEEDING: Status: RESOLVED | Noted: 2023-06-30 | Resolved: 2023-10-13

## 2023-10-13 PROCEDURE — 99214 OFFICE O/P EST MOD 30 MIN: CPT | Performed by: FAMILY MEDICINE

## 2023-10-13 PROCEDURE — G0439 PPPS, SUBSEQ VISIT: HCPCS | Performed by: FAMILY MEDICINE

## 2023-10-13 PROCEDURE — 90662 IIV NO PRSV INCREASED AG IM: CPT | Performed by: FAMILY MEDICINE

## 2023-10-13 PROCEDURE — G0008 ADMIN INFLUENZA VIRUS VAC: HCPCS | Performed by: FAMILY MEDICINE

## 2023-10-13 RX ORDER — CIPROFLOXACIN 500 MG/1
500 TABLET, FILM COATED ORAL 2 TIMES DAILY
COMMUNITY
Start: 2023-09-12

## 2023-10-13 NOTE — PATIENT INSTRUCTIONS
Medicare Preventive Visit Patient Instructions  Thank you for completing your Welcome to Medicare Visit or Medicare Annual Wellness Visit today. Your next wellness visit will be due in one year (10/13/2024). The screening/preventive services that you may require over the next 5-10 years are detailed below. Some tests may not apply to you based off risk factors and/or age. Screening tests ordered at today's visit but not completed yet may show as past due. Also, please note that scanned in results may not display below. Preventive Screenings:  Service Recommendations Previous Testing/Comments   Colorectal Cancer Screening  * Colonoscopy    * Fecal Occult Blood Test (FOBT)/Fecal Immunochemical Test (FIT)  * Fecal DNA/Cologuard Test  * Flexible Sigmoidoscopy Age: 43-73 years old   Colonoscopy: every 10 years (may be performed more frequently if at higher risk)  OR  FOBT/FIT: every 1 year  OR  Cologuard: every 3 years  OR  Sigmoidoscopy: every 5 years  Screening may be recommended earlier than age 39 if at higher risk for colorectal cancer. Also, an individualized decision between you and your healthcare provider will decide whether screening between the ages of 77-80 would be appropriate. Colonoscopy: 08/03/2023  FOBT/FIT: Not on file  Cologuard: Not on file  Sigmoidoscopy: Not on file          Breast Cancer Screening Age: 36 years old  Frequency: every 1-2 years  Not required if history of left and right mastectomy Mammogram: 08/30/2022    Screening Current   Cervical Cancer Screening Between the ages of 21-29, pap smear recommended once every 3 years. Between the ages of 32-69, can perform pap smear with HPV co-testing every 5 years.    Recommendations may differ for women with a history of total hysterectomy, cervical cancer, or abnormal pap smears in past. Pap Smear: 02/19/2020    Screening Not Indicated   Hepatitis C Screening Once for adults born between 1945 and 1965  More frequently in patients at high risk for Hepatitis C Hep C Antibody: Not on file        Diabetes Screening 1-2 times per year if you're at risk for diabetes or have pre-diabetes Fasting glucose: 86 mg/dL (9/26/2023)  A1C: 6.1 % (9/26/2023)  Screening Current   Cholesterol Screening Once every 5 years if you don't have a lipid disorder. May order more often based on risk factors. Lipid panel: 12/14/2020    Screening Current     Other Preventive Screenings Covered by Medicare:  Abdominal Aortic Aneurysm (AAA) Screening: covered once if your at risk. You're considered to be at risk if you have a family history of AAA. Lung Cancer Screening: covers low dose CT scan once per year if you meet all of the following conditions: (1) Age 48-67; (2) No signs or symptoms of lung cancer; (3) Current smoker or have quit smoking within the last 15 years; (4) You have a tobacco smoking history of at least 20 pack years (packs per day multiplied by number of years you smoked); (5) You get a written order from a healthcare provider. Glaucoma Screening: covered annually if you're considered high risk: (1) You have diabetes OR (2) Family history of glaucoma OR (3)  aged 48 and older OR (3)  American aged 72 and older  Osteoporosis Screening: covered every 2 years if you meet one of the following conditions: (1) You're estrogen deficient and at risk for osteoporosis based off medical history and other findings; (2) Have a vertebral abnormality; (3) On glucocorticoid therapy for more than 3 months; (4) Have primary hyperparathyroidism; (5) On osteoporosis medications and need to assess response to drug therapy. Last bone density test (DXA Scan): 02/05/2015. HIV Screening: covered annually if you're between the age of 14-79. Also covered annually if you are younger than 13 and older than 72 with risk factors for HIV infection. For pregnant patients, it is covered up to 3 times per pregnancy.     Immunizations:  Immunization Recommendations Influenza Vaccine Annual influenza vaccination during flu season is recommended for all persons aged >= 6 months who do not have contraindications   Pneumococcal Vaccine   * Pneumococcal conjugate vaccine = PCV13 (Prevnar 13), PCV15 (Vaxneuvance), PCV20 (Prevnar 20)  * Pneumococcal polysaccharide vaccine = PPSV23 (Pneumovax) Adults 43-79 yo with certain risk factors or if 69+ yo  If never received any pneumonia vaccine: recommend Prevnar 20 (PCV20)  Give PCV20 if previously received 1 dose of PCV13 or PPSV23   Hepatitis B Vaccine 3 dose series if at intermediate or high risk (ex: diabetes, end stage renal disease, liver disease)   Respiratory syncytial virus (RSV) Vaccine - COVERED BY MEDICARE PART D  * RSVPreF3 (Arexvy) CDC recommends that adults 61years of age and older may receive a single dose of RSV vaccine using shared clinical decision-making (SCDM)   Tetanus (Td) Vaccine - COST NOT COVERED BY MEDICARE PART B Following completion of primary series, a booster dose should be given every 10 years to maintain immunity against tetanus. Td may also be given as tetanus wound prophylaxis. Tdap Vaccine - COST NOT COVERED BY MEDICARE PART B Recommended at least once for all adults. For pregnant patients, recommended with each pregnancy. Shingles Vaccine (Shingrix) - COST NOT COVERED BY MEDICARE PART B  2 shot series recommended in those 19 years and older who have or will have weakened immune systems or those 50 years and older     Health Maintenance Due:      Topic Date Due   • Breast Cancer Screening: Mammogram  08/30/2023   • Colorectal Cancer Screening  Discontinued     Immunizations Due:      Topic Date Due   • COVID-19 Vaccine (5 - Moderna series) 09/09/2022   • Influenza Vaccine (1) 09/01/2023     Advance Directives   What are advance directives? Advance directives are legal documents that state your wishes and plans for medical care.  These plans are made ahead of time in case you lose your ability to make decisions for yourself. Advance directives can apply to any medical decision, such as the treatments you want, and if you want to donate organs. What are the types of advance directives? There are many types of advance directives, and each state has rules about how to use them. You may choose a combination of any of the following:  Living will: This is a written record of the treatment you want. You can also choose which treatments you do not want, which to limit, and which to stop at a certain time. This includes surgery, medicine, IV fluid, and tube feedings. Durable power of  for [facility]): This is a written record that states who you want to make healthcare choices for you when you are unable to make them for yourself. This person, called a proxy, is usually a family member or a friend. You may choose more than 1 proxy. Do not resuscitate (DNR) order:  A DNR order is used in case your heart stops beating or you stop breathing. It is a request not to have certain forms of treatment, such as CPR. A DNR order may be included in other types of advance directives. Medical directive: This covers the care that you want if you are in a coma, near death, or unable to make decisions for yourself. You can list the treatments you want for each condition. Treatment may include pain medicine, surgery, blood transfusions, dialysis, IV or tube feedings, and a ventilator (breathing machine). Values history: This document has questions about your views, beliefs, and how you feel and think about life. This information can help others choose the care that you would choose. Why are advance directives important? An advance directive helps you control your care. Although spoken wishes may be used, it is better to have your wishes written down. Spoken wishes can be misunderstood, or not followed. Treatments may be given even if you do not want them.  An advance directive may make it easier for your family to make difficult choices about your care. Urinary Incontinence   Urinary incontinence (UI)  is when you lose control of your bladder. UI develops because your bladder cannot store or empty urine properly. The 3 most common types of UI are stress incontinence, urge incontinence, or both. Medicines:   May be given to help strengthen your bladder control. Report any side effects of medication to your healthcare provider. Do pelvic muscle exercises often:  Your pelvic muscles help you stop urinating. Squeeze these muscles tight for 5 seconds, then relax for 5 seconds. Gradually work up to squeezing for 10 seconds. Do 3 sets of 15 repetitions a day, or as directed. This will help strengthen your pelvic muscles and improve bladder control. Train your bladder:  Go to the bathroom at set times, such as every 2 hours, even if you do not feel the urge to go. You can also try to hold your urine when you feel the urge to go. For example, hold your urine for 5 minutes when you feel the urge to go. As that becomes easier, hold your urine for 10 minutes. Self-care:   Keep a UI record. Write down how often you leak urine and how much you leak. Make a note of what you were doing when you leaked urine. Drink liquids as directed. You may need to limit the amount of liquid you drink to help control your urine leakage. Do not drink any liquid right before you go to bed. Limit or do not have drinks that contain caffeine or alcohol. Prevent constipation. Eat a variety of high-fiber foods. Good examples are high-fiber cereals, beans, vegetables, and whole-grain breads. Walking is the best way to trigger your intestines to have a bowel movement. Exercise regularly and maintain a healthy weight. Weight loss and exercise will decrease pressure on your bladder and help you control your leakage. Use a catheter as directed  to help empty your bladder.  A catheter is a tiny, plastic tube that is put into your bladder to drain your urine. Go to behavior therapy as directed. Behavior therapy may be used to help you learn to control your urge to urinate. Weight Management   Why it is important to manage your weight:  Being overweight increases your risk of health conditions such as heart disease, high blood pressure, type 2 diabetes, and certain types of cancer. It can also increase your risk for osteoarthritis, sleep apnea, and other respiratory problems. Aim for a slow, steady weight loss. Even a small amount of weight loss can lower your risk of health problems. How to lose weight safely:  A safe and healthy way to lose weight is to eat fewer calories and get regular exercise. You can lose up about 1 pound a week by decreasing the number of calories you eat by 500 calories each day. Healthy meal plan for weight management:  A healthy meal plan includes a variety of foods, contains fewer calories, and helps you stay healthy. A healthy meal plan includes the following:  Eat whole-grain foods more often. A healthy meal plan should contain fiber. Fiber is the part of grains, fruits, and vegetables that is not broken down by your body. Whole-grain foods are healthy and provide extra fiber in your diet. Some examples of whole-grain foods are whole-wheat breads and pastas, oatmeal, brown rice, and bulgur. Eat a variety of vegetables every day. Include dark, leafy greens such as spinach, kale, anastasia greens, and mustard greens. Eat yellow and orange vegetables such as carrots, sweet potatoes, and winter squash. Eat a variety of fruits every day. Choose fresh or canned fruit (canned in its own juice or light syrup) instead of juice. Fruit juice has very little or no fiber. Eat low-fat dairy foods. Drink fat-free (skim) milk or 1% milk. Eat fat-free yogurt and low-fat cottage cheese. Try low-fat cheeses such as mozzarella and other reduced-fat cheeses. Choose meat and other protein foods that are low in fat.   Choose beans or other legumes such as split peas or lentils. Choose fish, skinless poultry (chicken or turkey), or lean cuts of red meat (beef or pork). Before you cook meat or poultry, cut off any visible fat. Use less fat and oil. Try baking foods instead of frying them. Add less fat, such as margarine, sour cream, regular salad dressing and mayonnaise to foods. Eat fewer high-fat foods. Some examples of high-fat foods include french fries, doughnuts, ice cream, and cakes. Eat fewer sweets. Limit foods and drinks that are high in sugar. This includes candy, cookies, regular soda, and sweetened drinks. Exercise:  Exercise at least 30 minutes per day on most days of the week. Some examples of exercise include walking, biking, dancing, and swimming. You can also fit in more physical activity by taking the stairs instead of the elevator or parking farther away from stores. Ask your healthcare provider about the best exercise plan for you. © Copyright [employer] 2018 Information is for End User's use only and may not be sold, redistributed or otherwise used for commercial purposes.  All illustrations and images included in CareNotes® are the copyrighted property of [employer] Inc[employer] or [address] [place], [place] [address]ols

## 2023-10-13 NOTE — PROGRESS NOTES
Assessment and Plan:     Problem List Items Addressed This Visit          Endocrine    Subclinical hypothyroidism    Relevant Orders    TSH, 3rd generation with Free T4 reflex       Cardiovascular and Mediastinum    Hypertension - Primary    Relevant Orders    Comprehensive metabolic panel    CBC and differential       Other    Leukopenia    Prediabetes    Relevant Orders    Hemoglobin A1C     Other Visit Diagnoses       Myofascial pain syndrome        Relevant Medications    sarapin injection 2 mL (Start on 10/13/2023  9:00 AM)    Other Relevant Orders    Trigger Point Injection    Medicare annual wellness visit, subsequent        Encounter for immunization        Relevant Orders    influenza vaccine, high-dose, PF 0.7 mL (FLUZONE HIGH-DOSE) (Completed)          BMI Counseling: Body mass index is 26.23 kg/m². The BMI is above normal. Nutrition recommendations include decreasing portion sizes, consuming healthier snacks, moderation in carbohydrate intake, reducing intake of saturated and trans fat and reducing intake of cholesterol. Exercise recommendations include exercising 3-5 times per week. No pharmacotherapy was ordered. Rationale for BMI follow-up plan is due to patient being overweight or obese. Preventive health issues were discussed with patient, and age appropriate screening tests were ordered as noted in patient's After Visit Summary. Personalized health advice and appropriate referrals for health education or preventive services given if needed, as noted in patient's After Visit Summary.      History of Present Illness:     Patient presents for a Medicare Wellness Visit    HPI   Patient Care Team:  Kishan Scruggs MD as PCP - MD Saniya Holder MD (Colon and Rectal Surgery)     Review of Systems:     Review of Systems     Problem List:     Patient Active Problem List   Diagnosis    Cyst of left ovary    Diverticulosis    Hypertension    Osteoarthritis of knee Gastroesophageal reflux disease without esophagitis    Family history of colon cancer    History of recurrent UTIs    Leukopenia    Prediabetes    Subclinical hypothyroidism    Constipation due to outlet dysfunction      Past Medical and Surgical History:     Past Medical History:   Diagnosis Date    Accelerated essential hypertension     last assessed 08/17/2015    Depression with anxiety     last assesed 08/16/2012    Neutropenia ([address]) 1/8/2020    Post-menopausal bleeding 12/5/2013    Strep pharyngitis 8/18/2019     Past Surgical History:   Procedure Laterality Date    BLEPHAROPLASTY      upper lid w/excessive skin    CARDIOVASCULAR STRESS TEST      onset June 2005    COLONOSCOPY      DILATION AND CURETTAGE, DIAGNOSTIC / THERAPEUTIC      ENDOMETRIAL BIOPSY      negative for dysplasia, hyperplasia and malignancy, resolved 03/25/2013    TUBAL LIGATION        Family History:     Family History   Problem Relation Age of Onset    Heart disease Father         cardiac disorder    Skin cancer Father     Colon cancer Sister 80    Heart disease Family     Hypertension Family     Coronary artery disease Family     No Known Problems Daughter     No Known Problems Sister     No Known Problems Sister     No Known Problems Daughter     No Known Problems Daughter     No Known Problems Maternal Aunt     No Known Problems Maternal Aunt     No Known Problems Maternal Aunt     No Known Problems Paternal Aunt     No Known Problems Paternal Aunt     No Known Problems Paternal Aunt     No Known Problems Mother     No Known Problems Maternal Grandmother     No Known Problems Maternal Grandfather     No Known Problems Paternal Grandmother     No Known Problems Paternal Grandfather       Social History:     Social History     Socioeconomic History    Marital status:       Spouse name: None    Number of children: None    Years of education: None    Highest education level: None   Occupational History    None   Tobacco Use    Smoking status: Never    Smokeless tobacco: Never   Substance and Sexual Activity    Alcohol use: Yes     Comment: rare    Drug use: No    Sexual activity: None   Other Topics Concern    None   Social History Narrative    Denied history of home environment domestic violence     Social Determinants of Health     Financial Resource Strain: Low Risk  (10/13/2023)    Overall Financial Resource Strain (CARDIA)     Difficulty of Paying Living Expenses: Not hard at all   Food Insecurity: Not on file   Transportation Needs: No Transportation Needs (10/13/2023)    PRAPARE - Transportation     Lack of Transportation (Medical): No     Lack of Transportation (Non-Medical):  No   Physical Activity: Not on file   Stress: Not on file   Social Connections: Not on file   Intimate Partner Violence: Not on file   Housing Stability: Not on file      Medications and Allergies:     Current Outpatient Medications   Medication Sig Dispense Refill    amLODIPine (NORVASC) 2.5 mg tablet 2 tablets in AM. 1 tablet in PM. 270 tablet 3    Calcium Carb-Cholecalciferol 1000-800 MG-UNIT TABS Take by mouth      Cranberry 64132 MG CAPS Take 1 capsule by mouth in the morning      irbesartan (AVAPRO) 300 mg tablet TAKE 1 TABLET BY MOUTH DAILY AT BEDTIME 90 tablet 3    ketoconazole (NIZORAL) 2 % shampoo Apply 1 application topically 2 (two) times a week      methenamine hippurate (HIPREX) 1 g tablet Take 0.5 g by mouth 2 (two) times a day      metoprolol tartrate (LOPRESSOR) 50 mg tablet Take 1 tablet (50 mg total) by mouth 2 (two) times a day 180 tablet 3    multivitamin (THERAGRAN) TABS Take 1 tablet by mouth      ciprofloxacin (CIPRO) 500 mg tablet Take 500 mg by mouth 2 (two) times a day (Patient not taking: Reported on 10/13/2023)      LORazepam (ATIVAN) 0.5 mg tablet Take 1 tablet (0.5 mg total) by mouth 2 (two) times a day as needed for anxiety (Patient not taking: Reported on 5/8/2023) 20 tablet 0     Current Facility-Administered Medications   Medication Dose Route Frequency Provider Last Rate Last Admin    sarapin injection 2 mL  2 mL Injection Once Adonay Bertrand MD         Allergies   Allergen Reactions    Penicillins Rash      Immunizations:     Immunization History   Administered Date(s) Administered    COVID-19 MODERNA VACC 0.5 ML IM 02/03/2021, 03/03/2021, 11/02/2021, 07/15/2022    INFLUENZA 09/28/2019, 10/05/2020    Influenza Split High Dose Preservative Free IM 09/18/2013, 09/23/2014, 11/17/2015, 11/30/2016, 12/12/2017, 10/05/2020    Influenza, high dose seasonal 0.7 mL 11/05/2018, 09/28/2019, 10/04/2021, 11/16/2021, 11/04/2022, 10/13/2023    Influenza, seasonal, injectable 1939, 09/10/2012    Pneumococcal Conjugate 13-Valent 11/17/2015    Pneumococcal Polysaccharide PPV23 11/02/2006    Td (adult), adsorbed 12/29/2010      Health Maintenance:         Topic Date Due    Breast Cancer Screening: Mammogram  08/30/2023    Colorectal Cancer Screening  Discontinued         Topic Date Due    COVID-19 Vaccine (5 - [employer] series) 09/09/2022      Medicare Screening Tests and Risk Assessments:     Mel Fletcher is here for her Subsequent Wellness visit. Last Medicare Wellness visit information reviewed, patient interviewed and updates made to the record today. Health Risk Assessment:   Patient rates overall health as very good. Patient feels that their physical health rating is same. Patient is very satisfied with their life. Eyesight was rated as same. Hearing was rated as same. Patient feels that their emotional and mental health rating is same. Patients states they are never, rarely angry. Patient states they are never, rarely unusually tired/fatigued. Pain experienced in the last 7 days has been some. Patient's pain rating has been 6/10. Patient states that she has experienced no weight loss or gain in last 6 months. Fall Risk Screening:    In the past year, patient has experienced: no history of falling in past year      Urinary Incontinence Screening: Patient has not leaked urine accidently in the last six months. Home Safety:  Patient does not have trouble with stairs inside or outside of their home. Patient has working smoke alarms and has working carbon monoxide detector. Home safety hazards include: none. Nutrition:   Current diet is Regular. Medications:   Patient is currently taking over-the-counter supplements. OTC medications include: see medication list. Patient is able to manage medications. Activities of Daily Living (ADLs)/Instrumental Activities of Daily Living (IADLs):   Walk and transfer into and out of bed and chair?: Yes  Dress and groom yourself?: Yes    Bathe or shower yourself?: Yes    Feed yourself?  Yes  Do your laundry/housekeeping?: Yes  Manage your money, pay your bills and track your expenses?: Yes  Make your own meals?: Yes    Do your own shopping?: Yes    Previous Hospitalizations:   Any hospitalizations or ED visits within the last 12 months?: No      Advance Care Planning:   Living will: No    Advanced directive: No      Cognitive Screening:   Provider or family/friend/caregiver concerned regarding cognition?: No    PREVENTIVE SCREENINGS      Cardiovascular Screening:    General: Screening Current      Diabetes Screening:     General: Screening Current      Colorectal Cancer Screening:     General: Screening Current      Breast Cancer Screening:     General: Screening Current    Due for: Mammogram        Cervical Cancer Screening:    General: Screening Not Indicated      Osteoporosis Screening:    General: Screening Current      Abdominal Aortic Aneurysm (AAA) Screening:        General: Screening Not Indicated      Lung Cancer Screening:     General: Screening Not Indicated      Hepatitis C Screening:    General: Screening Not Indicated    Screening, Brief Intervention, and Referral to Treatment (SBIRT)    Screening  Typical number of drinks in a day: 0  Typical number of drinks in a week: 0  Interpretation: Low risk drinking behavior. Single Item Drug Screening:  How often have you used an illegal drug (including marijuana) or a prescription medication for non-medical reasons in the past year? never    Single Item Drug Screen Score: 0  Interpretation: Negative screen for possible drug use disorder    Brief Intervention  Alcohol & drug use screenings were reviewed. No concerns regarding substance use disorder identified. Other Counseling Topics:   Regular weightbearing exercise and calcium and vitamin D intake.           Physical Exam:     /70 (BP Location: Left arm, Patient Position: Sitting, Cuff Size: Standard)   Pulse (!) 47   Temp (!) 97.4 °F (36.3 °C)   Resp 16   Ht 5' 7" (1.702 m)   Wt 76 kg (167 lb 8 oz)   SpO2 98%   BMI 26.23 kg/m²     Physical Exam     Briana Mack MD

## 2023-10-23 ENCOUNTER — ULTRASOUND (OUTPATIENT)
Dept: OBGYN CLINIC | Facility: CLINIC | Age: 83
End: 2023-10-23
Payer: MEDICARE

## 2023-10-23 DIAGNOSIS — N83.202 CYST OF LEFT OVARY: Primary | ICD-10-CM

## 2023-10-23 PROCEDURE — 76856 US EXAM PELVIC COMPLETE: CPT

## 2023-10-30 ENCOUNTER — TELEPHONE (OUTPATIENT)
Dept: OBGYN CLINIC | Facility: CLINIC | Age: 83
End: 2023-10-30

## 2023-10-30 NOTE — TELEPHONE ENCOUNTER
Patient informed of pelvic US results from 10/19/2023 & recommendation to have repeat pelvic US in 6 Mariana Abbasi MD.  Appointment fro same scheduled.

## 2024-01-26 ENCOUNTER — TELEPHONE (OUTPATIENT)
Age: 84
End: 2024-01-26

## 2024-01-26 ENCOUNTER — TELEPHONE (OUTPATIENT)
Dept: INTERNAL MEDICINE CLINIC | Facility: CLINIC | Age: 84
End: 2024-01-26

## 2024-01-26 ENCOUNTER — OFFICE VISIT (OUTPATIENT)
Dept: URGENT CARE | Age: 84
End: 2024-01-26
Payer: MEDICARE

## 2024-01-26 VITALS
OXYGEN SATURATION: 98 % | TEMPERATURE: 97.3 F | DIASTOLIC BLOOD PRESSURE: 77 MMHG | HEART RATE: 62 BPM | SYSTOLIC BLOOD PRESSURE: 149 MMHG | RESPIRATION RATE: 18 BRPM

## 2024-01-26 DIAGNOSIS — R05.1 ACUTE COUGH: ICD-10-CM

## 2024-01-26 DIAGNOSIS — U07.1 COVID: Primary | ICD-10-CM

## 2024-01-26 DIAGNOSIS — U07.1 COVID-19 VIRUS INFECTION: Primary | ICD-10-CM

## 2024-01-26 LAB
SARS-COV-2 AG UPPER RESP QL IA: POSITIVE
VALID CONTROL: ABNORMAL

## 2024-01-26 PROCEDURE — 99213 OFFICE O/P EST LOW 20 MIN: CPT

## 2024-01-26 PROCEDURE — G0463 HOSPITAL OUTPT CLINIC VISIT: HCPCS

## 2024-01-26 PROCEDURE — 87811 SARS-COV-2 COVID19 W/OPTIC: CPT

## 2024-01-26 RX ORDER — GUAIFENESIN 600 MG/1
600 TABLET, EXTENDED RELEASE ORAL EVERY 12 HOURS SCHEDULED
Qty: 14 TABLET | Refills: 0 | Status: SHIPPED | OUTPATIENT
Start: 2024-01-26

## 2024-01-26 RX ORDER — FLUTICASONE PROPIONATE 50 MCG
1 SPRAY, SUSPENSION (ML) NASAL DAILY
Qty: 9.9 ML | Refills: 0 | Status: SHIPPED | OUTPATIENT
Start: 2024-01-26

## 2024-01-26 RX ORDER — NIRMATRELVIR AND RITONAVIR 300-100 MG
3 KIT ORAL 2 TIMES DAILY
Qty: 30 TABLET | Refills: 0 | Status: SHIPPED | OUTPATIENT
Start: 2024-01-26 | End: 2024-01-31

## 2024-01-26 NOTE — PROGRESS NOTES
[facility]        NAME: Rosa Maria Grant is a 83 y.o. female  : 1940    MRN: [id number]  DATE: 2024  TIME: 9:05 AM    Assessment and Plan   COVID [U07.1]  1. COVID  fluticasone (FLONASE) 50 mcg/act nasal spray    guaiFENesin (MUCINEX) 600 mg 12 hr tablet      2. Acute cough  Poct Covid 19 Rapid Antigen Test        In office COVID test positive.  Patient appears in good health and in good condition for age.  Discussed potential treatment with Paxlovid with patient due to her age, and to lessen risk for increase of symptoms requiring hospitalization. patient declined stating that she will follow-up with her family doctor if she would like to start Paxlovid in the next 4 days.    Patient appears overall well, physical exam demonstrating congestion, bilateral nasal turbinate swelling, no cervical lymphadenopathy, lung sounds clear bilaterally, without any signs of respiratory distress.    Will treat with Mucinex and Flonase.  Will continue to hydrate, and Tylenol as needed for pain and fever.  See department if she develops any shortness of breath, fevers, chest pain.      Patient Instructions       Follow up with PCP in 3-5 days.  Proceed to  ER if symptoms worsen.    Chief Complaint     Chief Complaint   Patient presents with   • Generalized Body Aches   • Cough   • Headache   • Sore Throat     Symptoms started yesterday         History of Present Illness       Patient is an 83-year-old female presenting with less than 24 hours of sore throat, congestion, runny nose, and headache.  Patient denies fever, chills, nausea vomiting or diarrhea, no chest pain no shortness of breath or difficulty breathing.  Patient states her symptoms had a rapid onset and has tried taking Tylenol for the symptoms without relief.  Patient denies sick contacts.      Generalized Body Aches  Associated symptoms include headaches, a sore throat and coughing.   Cough  Associated symptoms include headaches and a sore  throat.   Headache  Sore Throat   Associated symptoms include coughing and headaches.       Review of Systems   Review of Systems   HENT:  Positive for sore throat.    Respiratory:  Positive for cough.    Neurological:  Positive for headaches.         Current Medications       Current Outpatient Medications:   •  amLODIPine (NORVASC) 2.5 mg tablet, 2 tablets in AM. 1 tablet in PM., Disp: 270 tablet, Rfl: 3  •  Calcium Carb-Cholecalciferol 1000-800 MG-UNIT TABS, Take by mouth, Disp: , Rfl:   •  Cranberry 36048 MG CAPS, Take 1 capsule by mouth in the morning, Disp: , Rfl:   •  fluticasone (FLONASE) 50 mcg/act nasal spray, 1 spray into each nostril daily, Disp: 9.9 mL, Rfl: 0  •  guaiFENesin (MUCINEX) 600 mg 12 hr tablet, Take 1 tablet (600 mg total) by mouth every 12 (twelve) hours, Disp: 14 tablet, Rfl: 0  •  irbesartan (AVAPRO) 300 mg tablet, TAKE 1 TABLET BY MOUTH DAILY AT BEDTIME, Disp: 90 tablet, Rfl: 3  •  ketoconazole (NIZORAL) 2 % shampoo, Apply 1 application topically 2 (two) times a week, Disp: , Rfl:   •  methenamine hippurate (HIPREX) 1 g tablet, Take 0.5 g by mouth 2 (two) times a day, Disp: , Rfl:   •  metoprolol tartrate (LOPRESSOR) 50 mg tablet, Take 1 tablet (50 mg total) by mouth 2 (two) times a day, Disp: 180 tablet, Rfl: 3  •  multivitamin (THERAGRAN) TABS, Take 1 tablet by mouth, Disp: , Rfl:   •  ciprofloxacin (CIPRO) 500 mg tablet, Take 500 mg by mouth 2 (two) times a day (Patient not taking: Reported on 10/13/2023), Disp: , Rfl:   •  LORazepam (ATIVAN) 0.5 mg tablet, Take 1 tablet (0.5 mg total) by mouth 2 (two) times a day as needed for anxiety (Patient not taking: Reported on 5/8/2023), Disp: 20 tablet, Rfl: 0    Current Allergies     Allergies as of 01/26/2024 - Reviewed 01/26/2024   Allergen Reaction Noted   • Penicillins Rash 06/10/2005            The following portions of the patient's history were reviewed and updated as appropriate: allergies, current medications, past family history,  past medical history, past social history, past surgical history and problem list.     Past Medical History:   Diagnosis Date   • Accelerated essential hypertension     last assessed 08/17/2015   • Depression with anxiety     last assesed 08/16/2012   • Neutropenia (HCC) 1/8/2020   • Post-menopausal bleeding 12/5/2013   • Strep pharyngitis 8/18/2019       Past Surgical History:   Procedure Laterality Date   • BLEPHAROPLASTY      upper lid w/excessive skin   • CARDIOVASCULAR STRESS TEST      onset June 2005   • COLONOSCOPY     • DILATION AND CURETTAGE, DIAGNOSTIC / THERAPEUTIC     • ENDOMETRIAL BIOPSY      negative for dysplasia, hyperplasia and malignancy, resolved 03/25/2013   • TUBAL LIGATION         Family History   Problem Relation Age of Onset   • Heart disease Father         cardiac disorder   • Skin cancer Father    • Colon cancer Sister 81   • Heart disease Family    • Hypertension Family    • Coronary artery disease Family    • No Known Problems Daughter    • No Known Problems Sister    • No Known Problems Sister    • No Known Problems Daughter    • No Known Problems Daughter    • No Known Problems Maternal Aunt    • No Known Problems Maternal Aunt    • No Known Problems Maternal Aunt    • No Known Problems Paternal Aunt    • No Known Problems Paternal Aunt    • No Known Problems Paternal Aunt    • No Known Problems Mother    • No Known Problems Maternal Grandmother    • No Known Problems Maternal Grandfather    • No Known Problems Paternal Grandmother    • No Known Problems Paternal Grandfather          Medications have been verified.        Objective   /77   Pulse 62   Temp (!) 97.3 °F (36.3 °C)   Resp 18   SpO2 98%   No LMP recorded. Patient is postmenopausal.       Physical Exam     Physical Exam  Vitals and nursing note reviewed.   Constitutional:       General: She is not in acute distress.     Appearance: She is not ill-appearing.   HENT:      Right Ear: Tympanic membrane normal.      Left  Ear: Tympanic membrane normal.      Nose: Congestion and rhinorrhea present.      Mouth/Throat:      Mouth: Mucous membranes are moist.      Tonsils: No tonsillar exudate.   Cardiovascular:      Rate and Rhythm: Normal rate and regular rhythm.      Heart sounds: Normal heart sounds. No murmur heard.  Pulmonary:      Effort: Pulmonary effort is normal. No respiratory distress.      Breath sounds: Normal breath sounds. No wheezing or rhonchi.   Abdominal:      General: Bowel sounds are normal.      Palpations: Abdomen is soft.   Lymphadenopathy:      Cervical: No cervical adenopathy.   Skin:     General: Skin is warm and dry.   Neurological:      Mental Status: She is alert.

## 2024-01-26 NOTE — PATIENT INSTRUCTIONS
In Office Covid test is Positive     You need to quarantine at home for a minimum of 5 days; the day your symptoms started is DAY 0! You may end your quarantine when you are fever free without medications to reduce fever (e.g. acetaminophen/Tylenol) for 24 hours or after 5 full days of quarantine, whichever comes later. Anyone whom you have been in close regular contact (unmasked > 15 minute intervals) since you began having symptoms should be tested within 5-6 days of your positive test regardless of vaccination status or symptoms. Masks should be worn at all times whenever indoors until 10 days after your exposure or positive result. Anyone with whom you have been in close contact should mask for 10 days, if they develop symptoms at any point then their clock for masking and quarantine goes back to 0, meaning they need to quarantine 5 days and mask 10 days.      Flonase nasal spray as needed for sinus congestion.   Take Mucinex DM as needed over the counter for cough in adults. Recommend Children's mucinex for children > 4 years and Zarbee's cough and cold or Xi drops for children 2-4 years old.   Recommend over the counter lozenges for any sore throat symptoms.  Motrin and/or Tylenol as needed for fever.   Recommend Vitamin C, Vitamin D3, multivitamin and Zinc for immune support. Discuss dosing recommendations with pharmacist especially in children and infants.   If your symptoms worsen or you develop shortness of breath report to the nearest emergency room.

## 2024-01-26 NOTE — TELEPHONE ENCOUNTER
Pharmacy called states they needed the kidney function for the paxlovid was sent for the patient and would need a call back to confirm , please call advise .  [phone]

## 2024-01-26 NOTE — TELEPHONE ENCOUNTER
Cardiology Nephrology Nephrology Nephrology PT called that she was at the [facility] now this morning and she tested positive for covid. They asked her if she wanted to paxlovid and she declined. After going home and speaking to her kids they adv she should get it. Can we please send a script to the pharmacy. [facility] notes are in the system. Thank you    Nephrology Nephrology Cardiology Cardiology Cardiology Nephrology Cardiology Cardiology Cardiology Intervent Cardiology Cardiology Intervent Cardiology

## 2024-01-26 NOTE — TELEPHONE ENCOUNTER
Called Pharmacy gave GFR 68, then called patient to let her know script is ok , pharmacy will call when Rx ready for

## 2024-02-22 ENCOUNTER — APPOINTMENT (OUTPATIENT)
Dept: LAB | Age: 84
End: 2024-02-22
Payer: MEDICARE

## 2024-02-22 DIAGNOSIS — Z87.440 HISTORY OF RECURRENT UTIS: ICD-10-CM

## 2024-02-22 LAB
BUN SERPL-MCNC: 14 MG/DL (ref 5–25)
CREAT SERPL-MCNC: 0.69 MG/DL (ref 0.6–1.3)
GFR SERPL CREATININE-BSD FRML MDRD: 79 ML/MIN/1.73SQ M

## 2024-02-22 PROCEDURE — 84520 ASSAY OF UREA NITROGEN: CPT

## 2024-02-22 PROCEDURE — 36415 COLL VENOUS BLD VENIPUNCTURE: CPT

## 2024-02-22 PROCEDURE — 82565 ASSAY OF CREATININE: CPT

## 2024-03-15 ENCOUNTER — HOSPITAL ENCOUNTER (OUTPATIENT)
Dept: RADIOLOGY | Age: 84
Discharge: HOME | End: 2024-03-15
Payer: MEDICARE

## 2024-03-15 VITALS — WEIGHT: 167.55 LBS | HEIGHT: 67 IN | BODY MASS INDEX: 26.3 KG/M2

## 2024-03-15 DIAGNOSIS — Z12.31 ENCOUNTER FOR SCREENING MAMMOGRAM FOR MALIGNANT NEOPLASM OF BREAST: ICD-10-CM

## 2024-03-15 PROCEDURE — 77063 BREAST TOMOSYNTHESIS BI: CPT

## 2024-03-15 PROCEDURE — 77067 SCR MAMMO BI INCL CAD: CPT

## 2024-04-04 DIAGNOSIS — I10 ACCELERATED HYPERTENSION: ICD-10-CM

## 2024-04-04 RX ORDER — AMLODIPINE BESYLATE 2.5 MG/1
TABLET ORAL
Qty: 270 TABLET | Refills: 1 | Status: SHIPPED | OUTPATIENT
Start: 2024-04-04

## 2024-04-04 NOTE — TELEPHONE ENCOUNTER
Reason for call:   [x] Refill   [] Prior Auth  [] Other:     Office:   [x] PCP/Provider -   [] Specialty/Provider -     Medication: amLODIPine (NORVASC) 2.5 mg tablet     Dose/Frequency: 2 tablets in AM. 1 tablet in PM     Quantity: 270    Pharmacy: [facility]/pharmacy #[id number] - [place], [place] - [address] [phone]     Does the patient have enough for 3 days?   [x] Yes   [] No - Send as HP to POD

## 2024-04-05 NOTE — PROGRESS NOTES
AMB US Pelvic Non OB    Date/Time: 4/9/2024 9:30 AM    Performed by: Edna Damon  Authorized by: Valentino Ghotra MD  Universal Protocol:  Consent: Verbal consent obtained.  Consent given by: patient  Timeout called at: 4/9/2024 9:04 AM.  Patient understanding: patient states understanding of the procedure being performed  Patient identity confirmed: verbally with patient    Procedure details:     SIS Procedure: No    Indications: ovarian cysts      Technique:  Transvaginal US, Non-OB    Position: lithotomy exam    Uterine findings:     Length (cm): 6.41    Height (cm):  2.79    Width (cm):  3.73    Endometrial stripe: identified      Endometrium thickness (mm):  2.69  Left ovary findings:     Left ovary:  Visualized    Length (cm): 6.21    Height (cm): 5.88    Width (cm): 6.02  Right ovary findings:     Right ovary:  Visualized    Length (cm): 1.95    Height (cm): 0.83    Width (cm): 1.14  Other findings:     Free pelvic fluid: not identified      Free peritoneal fluid: not identified    Post-Procedure Details:     Impression:  Anteverted uterus and right ovary appear within normal limits. The left ovary demonstrates a 5.2cm simple cyst, stable. No free fluid.     Tolerance:  Tolerated well, no immediate complications    Complications: no complications    Additional Procedure Comments:      GE [id number] [id number] transvaginal transducer Serial #[id number] was used to perform the examination today and subsequently followed with high level disinfection utilizing Trophon EPR procedure.     Ultrasound performed at:     [facility]  [address] [place] [address]  [address]  [place] [address]  Phone  [phone]  Fax  [phone]

## 2024-04-09 ENCOUNTER — ULTRASOUND (OUTPATIENT)
Dept: OBGYN CLINIC | Facility: CLINIC | Age: 84
End: 2024-04-09

## 2024-04-09 DIAGNOSIS — R10.31 GROIN PAIN, RIGHT: ICD-10-CM

## 2024-04-09 DIAGNOSIS — R10.2 PELVIC PAIN: ICD-10-CM

## 2024-04-09 DIAGNOSIS — N83.202 CYST OF LEFT OVARY: ICD-10-CM

## 2024-04-09 DIAGNOSIS — N83.202 LEFT OVARIAN CYST: Primary | ICD-10-CM

## 2024-04-14 NOTE — PROGRESS NOTES
Pelvic ultrasound results reviewed and left ovarian simple cyst has remained stable    Will see patient in 1 month for her annual gynecologic and medical exam    Will repeat pelvic ultrasound in 1 year    Patient to call for any problems, questions, issues or concerns which arise for her

## 2024-05-02 ENCOUNTER — TELEPHONE (OUTPATIENT)
Age: 84
End: 2024-05-02

## 2024-05-02 NOTE — TELEPHONE ENCOUNTER
Pt called, she has been having pain in her legs and feeling fatigued.  She also has an upset stomach.  She has been drinking propel as you had suggested to her previously, and it does help somewhat.  Her fatigue is usually in the morning and gets better as the day goes on.    Pt requesting an appt for next week.  She does have appt 05/20/24, but does not want to wait that long.  Appt scheduled for 05/07/24 @ 9:40am.  Pt is going for lab work tomorrow that she was to do prior to appt 05/20/24, just in case that shows a reason for the fatigue.

## 2024-05-03 ENCOUNTER — APPOINTMENT (OUTPATIENT)
Dept: LAB | Age: 84
End: 2024-05-03
Payer: MEDICARE

## 2024-05-03 LAB
ALBUMIN SERPL BCP-MCNC: 3.9 G/DL (ref 3.5–5)
ALP SERPL-CCNC: 49 U/L (ref 34–104)
ALT SERPL W P-5'-P-CCNC: 16 U/L (ref 7–52)
ANION GAP SERPL CALCULATED.3IONS-SCNC: 8 MMOL/L (ref 4–13)
AST SERPL W P-5'-P-CCNC: 17 U/L (ref 13–39)
BASOPHILS # BLD AUTO: 0.05 THOUSANDS/ÂΜL (ref 0–0.1)
BASOPHILS NFR BLD AUTO: 1 % (ref 0–1)
BILIRUB SERPL-MCNC: 0.71 MG/DL (ref 0.2–1)
BUN SERPL-MCNC: 16 MG/DL (ref 5–25)
CALCIUM SERPL-MCNC: 9 MG/DL (ref 8.4–10.2)
CHLORIDE SERPL-SCNC: 101 MMOL/L (ref 96–108)
CO2 SERPL-SCNC: 29 MMOL/L (ref 21–32)
CREAT SERPL-MCNC: 0.85 MG/DL (ref 0.6–1.3)
EOSINOPHIL # BLD AUTO: 0.17 THOUSAND/ÂΜL (ref 0–0.61)
EOSINOPHIL NFR BLD AUTO: 4 % (ref 0–6)
ERYTHROCYTE [DISTWIDTH] IN BLOOD BY AUTOMATED COUNT: 12 % (ref 11.6–15.1)
EST. AVERAGE GLUCOSE BLD GHB EST-MCNC: 123 MG/DL
GFR SERPL CREATININE-BSD FRML MDRD: 62 ML/MIN/1.73SQ M
GLUCOSE P FAST SERPL-MCNC: 97 MG/DL (ref 65–99)
HBA1C MFR BLD: 5.9 %
HCT VFR BLD AUTO: 41.3 % (ref 34.8–46.1)
HGB BLD-MCNC: 13.4 G/DL (ref 11.5–15.4)
IMM GRANULOCYTES # BLD AUTO: 0.02 THOUSAND/UL (ref 0–0.2)
IMM GRANULOCYTES NFR BLD AUTO: 0 % (ref 0–2)
LYMPHOCYTES # BLD AUTO: 1.32 THOUSANDS/ÂΜL (ref 0.6–4.47)
LYMPHOCYTES NFR BLD AUTO: 28 % (ref 14–44)
MCH RBC QN AUTO: 30.8 PG (ref 26.8–34.3)
MCHC RBC AUTO-ENTMCNC: 32.4 G/DL (ref 31.4–37.4)
MCV RBC AUTO: 95 FL (ref 82–98)
MONOCYTES # BLD AUTO: 0.52 THOUSAND/ÂΜL (ref 0.17–1.22)
MONOCYTES NFR BLD AUTO: 11 % (ref 4–12)
NEUTROPHILS # BLD AUTO: 2.58 THOUSANDS/ÂΜL (ref 1.85–7.62)
NEUTS SEG NFR BLD AUTO: 56 % (ref 43–75)
NRBC BLD AUTO-RTO: 0 /100 WBCS
PLATELET # BLD AUTO: 267 THOUSANDS/UL (ref 149–390)
PMV BLD AUTO: 10.3 FL (ref 8.9–12.7)
POTASSIUM SERPL-SCNC: 4 MMOL/L (ref 3.5–5.3)
PROT SERPL-MCNC: 6.4 G/DL (ref 6.4–8.4)
RBC # BLD AUTO: 4.35 MILLION/UL (ref 3.81–5.12)
SODIUM SERPL-SCNC: 138 MMOL/L (ref 135–147)
TSH SERPL DL<=0.05 MIU/L-ACNC: 3.71 UIU/ML (ref 0.45–4.5)
WBC # BLD AUTO: 4.66 THOUSAND/UL (ref 4.31–10.16)

## 2024-05-07 ENCOUNTER — OFFICE VISIT (OUTPATIENT)
Dept: FAMILY MEDICINE CLINIC | Facility: CLINIC | Age: 84
End: 2024-05-07
Payer: MEDICARE

## 2024-05-07 VITALS
RESPIRATION RATE: 18 BRPM | TEMPERATURE: 97.8 F | BODY MASS INDEX: 26.21 KG/M2 | HEIGHT: 67 IN | HEART RATE: 87 BPM | OXYGEN SATURATION: 98 % | SYSTOLIC BLOOD PRESSURE: 130 MMHG | DIASTOLIC BLOOD PRESSURE: 78 MMHG | WEIGHT: 167 LBS

## 2024-05-07 DIAGNOSIS — I10 ESSENTIAL HYPERTENSION: ICD-10-CM

## 2024-05-07 DIAGNOSIS — E73.9 LACTOSE INTOLERANCE: ICD-10-CM

## 2024-05-07 DIAGNOSIS — R73.03 PREDIABETES: ICD-10-CM

## 2024-05-07 DIAGNOSIS — M79.605 BILATERAL LEG PAIN: Primary | ICD-10-CM

## 2024-05-07 DIAGNOSIS — M79.604 BILATERAL LEG PAIN: Primary | ICD-10-CM

## 2024-05-07 DIAGNOSIS — R00.1 BRADYCARDIA: ICD-10-CM

## 2024-05-07 PROCEDURE — 99214 OFFICE O/P EST MOD 30 MIN: CPT | Performed by: FAMILY MEDICINE

## 2024-05-07 PROCEDURE — G2211 COMPLEX E/M VISIT ADD ON: HCPCS | Performed by: FAMILY MEDICINE

## 2024-05-07 NOTE — PROGRESS NOTES
"Name: Rosa Maria Grant      : 1940      MRN: [id number]  Encounter Provider: Valentino Roth MD  Encounter Date: 2024   Encounter department: [facility]    Assessment & Plan     1. Bilateral leg pain    2. Bradycardia    3. Essential hypertension  -     metoprolol tartrate (LOPRESSOR) 25 mg tablet; Take 1 tablet (25 mg total) by mouth 2 (two) times a day    4. Prediabetes    5. Lactose intolerance    Decrease dose of Metoprolol tartrate to 25 mg BID.  Monitor BP at home.  If needed dose of Amlodipine can be increased for blood pressure control    Re leg pains-stay hydrated. Continue with Propel electrolyte solution    Watch diet. Defer GI testing at this time unless symptoms persist     OV 3 months        Subjective     Several week history of fatigue \"feels exhausted\"  still walking 4 days/weeks and goes to the [facility] 2x/week. No CP, palpitations or SOB. No dizziness. Both legs \"ache\" no swelling. no symptoms with walking. She started using Propel daily.     Hypertension-blood pressures have stable on Amlodipine 5 mg AM and 2.5 mg PM, Irbesartan 300 mg daily and Metoprolol 50 mg BID. Past history of hyponatremia and hypokalemia while on HCTZ.   Labs 2024  creatinine 0.85. GFR 62.  Electrolytes normal. Na+ 138. K+ 4.0.  Hgb 13.4.     Prediabetes FBS 97. A1c 5.9. + FH type DM.     2020 Lipid profile cholesterol 187. TGs 88. HDL 55. . LFTs normal.       Recent Results (from the past 672 hour(s))   Comprehensive metabolic panel    Collection Time: 24  8:49 AM   Result Value Ref Range    Sodium 138 135 - 147 mmol/L    Potassium 4.0 3.5 - 5.3 mmol/L    Chloride 101 96 - 108 mmol/L    CO2 29 21 - 32 mmol/L    ANION GAP 8 4 - 13 mmol/L    BUN 16 5 - 25 mg/dL    Creatinine 0.85 0.60 - 1.30 mg/dL    Glucose, Fasting 97 65 - 99 mg/dL    Calcium 9.0 8.4 - 10.2 mg/dL    AST 17 13 - 39 U/L    ALT 16 7 - 52 U/L    Alkaline Phosphatase 49 34 - 104 U/L    Total Protein 6.4 6.4 - 8.4 g/dL "    Albumin 3.9 3.5 - 5.0 g/dL    Total Bilirubin 0.71 0.20 - 1.00 mg/dL    eGFR 62 ml/min/1.73sq m   Hemoglobin A1C    Collection Time: 05/03/24  8:49 AM   Result Value Ref Range    Hemoglobin A1C 5.9 (H) Normal 4.0-5.6%; PreDiabetic 5.7-6.4%; Diabetic >=6.5%; Glycemic control for adults with diabetes <7.0% %     mg/dl   CBC and differential    Collection Time: 05/03/24  8:49 AM   Result Value Ref Range    WBC 4.66 4.31 - 10.16 Thousand/uL    RBC 4.35 3.81 - 5.12 Million/uL    Hemoglobin 13.4 11.5 - 15.4 g/dL    Hematocrit 41.3 34.8 - 46.1 %    MCV 95 82 - 98 fL    MCH 30.8 26.8 - 34.3 pg    MCHC 32.4 31.4 - 37.4 g/dL    RDW 12.0 11.6 - 15.1 %    MPV 10.3 8.9 - 12.7 fL    Platelets 267 149 - 390 Thousands/uL    nRBC 0 /100 WBCs    Segmented % 56 43 - 75 %    Immature Grans % 0 0 - 2 %    Lymphocytes % 28 14 - 44 %    Monocytes % 11 4 - 12 %    Eosinophils Relative 4 0 - 6 %    Basophils Relative 1 0 - 1 %    Absolute Neutrophils 2.58 1.85 - 7.62 Thousands/µL    Absolute Immature Grans 0.02 0.00 - 0.20 Thousand/uL    Absolute Lymphocytes 1.32 0.60 - 4.47 Thousands/µL    Absolute Monocytes 0.52 0.17 - 1.22 Thousand/µL    Eosinophils Absolute 0.17 0.00 - 0.61 Thousand/µL    Basophils Absolute 0.05 0.00 - 0.10 Thousands/µL   TSH, 3rd generation with Free T4 reflex    Collection Time: 05/03/24  8:49 AM   Result Value Ref Range    TSH 3RD GENERATON 3.707 0.450 - 4.500 uIU/mL           Review of Systems   Constitutional:  Negative for appetite change, chills, fatigue, fever and unexpected weight change.   HENT:  Negative for congestion, ear pain, hearing loss, rhinorrhea, sore throat and trouble swallowing.         Chronic nasal congestion improved with Flonase.    Eyes:  Negative for visual disturbance.        Cataract surgery OU    Respiratory:  Negative for cough, shortness of breath and wheezing.    Cardiovascular:  Negative for chest pain, palpitations and leg swelling.        01/2020 admission for substernal  chest pain. EKG NSR with frequent PVCs. she was sent to [facility]. Initial EKG NSR with fusion complexes. No acute changes. 2nd EKG sinus bradycardia with PACs. troponins x 3 negative.  Chest x-ray no active disease.  Stress echocardiogram normal.  EF 60%.   Gastrointestinal:  Positive for abdominal pain and constipation. Negative for blood in stool, diarrhea, nausea and vomiting.        Episodes abdominal discomfort after eating. Lactose intolerant. Recently ate pasta with len sauce 3 days in a row. No reflux. No dysphagia. Chronic constipation. Colonoscopy 03/2019 normal except for diverticulosis. + FH colon CA sister.     Endocrine: Negative for polydipsia and polyuria.        Phx subclinical hypothyroidism  TSH 07/2017  thyroid ABs negative.   02/2015 DEXA scan normal.    Lab Results       Component                Value               Date                       [id number]             3.707               05/03/2024                Lab Results       Component                Value               Date                       [id number]             4.190               02/27/2023        Lab Results       Component                Value               Date                       [id number]             4.590 (H)           08/04/2022                                      Genitourinary:  Negative for difficulty urinating, dysuria and hematuria.        History of recurrent UTIs followed by Urology. On Hiprex.  02/2019 kidney bladder ultrasound normal minimal PVR. 29 ML.  Simple cyst left adnexa without significant change.  pelvic u/s 08/2016 stable simple left ovarian cyst. 01/2017  normal at 9.5 followed by GYN.,    Musculoskeletal:  Positive for back pain. Negative for arthralgias and myalgias.   Skin:  Negative for rash.   Allergic/Immunologic: Positive for environmental allergies.   Neurological:  Negative for dizziness and headaches.   Hematological:  Negative for adenopathy. Does not bruise/bleed easily.         Past history of mild leukopenia  06/2018  Vitamin B12 589.  Folate greater than 20.     Lab Results       Component                Value               Date                       WBC                      4.80                09/26/2023                 HGB                      13.1                09/26/2023                 HCT                      40.5                09/26/2023                 MCV                      93                  09/26/2023                 PLT                      280                 09/26/2023                                                 WBC       Date                     Value               Ref Range           Status                06/29/2021               5.45                4.31 - 10.16 T*     Final                 12/14/2020               4.47                4.31 - 10.16 T*     Final                 05/27/2020               5.07                4.31 - 10.16 T*     Final                 06/11/2015               4.75                4.31 - 10.16 T*     Final                 04/02/2014               5.24                4.31 - 10.16 T*     Final                          Psychiatric/Behavioral:  Negative for dysphoric mood and sleep disturbance. The patient is not nervous/anxious.        Past Medical History:   Diagnosis Date    Accelerated essential hypertension     last assessed 08/17/2015    Allergic Pencillin    Arthritis     Depression with anxiety     last assesed 08/16/2012    Hypertension     Neutropenia (HCC) 01/08/2020    Post-menopausal bleeding 12/05/2013    Strep pharyngitis 08/18/2019     Past Surgical History:   Procedure Laterality Date    BLEPHAROPLASTY      upper lid w/excessive skin    CARDIOVASCULAR STRESS TEST      onset June 2005    COLONOSCOPY      DILATION AND CURETTAGE, DIAGNOSTIC / THERAPEUTIC      ENDOMETRIAL BIOPSY      negative for dysplasia, hyperplasia and malignancy, resolved 03/25/2013    TUBAL LIGATION       Family History   Problem Relation Age of  Onset    Heart disease Father         cardiac disorder    Skin cancer Father     Colon cancer Sister 81    Heart disease Family     Hypertension Family     Coronary artery disease Family     No Known Problems Daughter     No Known Problems Sister     No Known Problems Sister     No Known Problems Daughter     No Known Problems Daughter     No Known Problems Maternal Aunt     No Known Problems Maternal Aunt     No Known Problems Maternal Aunt     No Known Problems Paternal Aunt     No Known Problems Paternal Aunt     No Known Problems Paternal Aunt     No Known Problems Mother     No Known Problems Maternal Grandmother     No Known Problems Maternal Grandfather     No Known Problems Paternal Grandmother     No Known Problems Paternal Grandfather      Social History     Socioeconomic History    Marital status:      Spouse name: None    Number of children: None    Years of education: None    Highest education level: None   Occupational History    None   Tobacco Use    Smoking status: Never    Smokeless tobacco: Never   Substance and Sexual Activity    Alcohol use: Yes     Comment: 2 or 3 cocktails a year    Drug use: No    Sexual activity: None   Other Topics Concern    None   Social History Narrative    Denied history of home environment domestic violence     Social Determinants of Health     Financial Resource Strain: Low Risk  (10/13/2023)    Overall Financial Resource Strain (CARDIA)     Difficulty of Paying Living Expenses: Not hard at all   Food Insecurity: Not on file   Transportation Needs: No Transportation Needs (10/13/2023)    PRAPARE - Transportation     Lack of Transportation (Medical): No     Lack of Transportation (Non-Medical): No   Physical Activity: Not on file   Stress: Not on file   Social Connections: Not on file   Intimate Partner Violence: Not on file   Housing Stability: Not on file     Current Outpatient Medications on File Prior to Visit   Medication Sig    amLODIPine (NORVASC) 2.5 mg  "tablet 2 tablets in AM. 1 tablet in PM.    Calcium Carb-Cholecalciferol 1000-800 MG-UNIT TABS Take by mouth    ciprofloxacin (CIPRO) 500 mg tablet Take 500 mg by mouth 2 (two) times a day    Cranberry 08163 MG CAPS Take 1 capsule by mouth in the morning    fluticasone (FLONASE) 50 mcg/act nasal spray 1 spray into each nostril daily    guaiFENesin (MUCINEX) 600 mg 12 hr tablet Take 1 tablet (600 mg total) by mouth every 12 (twelve) hours    irbesartan (AVAPRO) 300 mg tablet TAKE 1 TABLET BY MOUTH DAILY AT BEDTIME    ketoconazole (NIZORAL) 2 % shampoo Apply 1 application topically 2 (two) times a week    methenamine hippurate (HIPREX) 1 g tablet Take 0.5 g by mouth 2 (two) times a day    multivitamin (THERAGRAN) TABS Take 1 tablet by mouth    [DISCONTINUED] metoprolol tartrate (LOPRESSOR) 50 mg tablet Take 1 tablet (50 mg total) by mouth 2 (two) times a day    [DISCONTINUED] LORazepam (ATIVAN) 0.5 mg tablet Take 1 tablet (0.5 mg total) by mouth 2 (two) times a day as needed for anxiety (Patient not taking: Reported on 5/8/2023)     Allergies   Allergen Reactions    Penicillins Rash     Immunization History   Administered Date(s) Administered    COVID-19 MODERNA VACC 0.5 ML IM 02/03/2021, 03/03/2021, 11/02/2021, 07/15/2022    COVID-19 Moderna Vac BIVALENT 12 Yr+ IM 0.5 ML 12/02/2022    INFLUENZA 09/28/2019, 10/05/2020    Influenza Split High Dose Preservative Free IM 09/18/2013, 09/23/2014, 11/17/2015, 11/30/2016, 12/12/2017, 10/05/2020    Influenza, high dose seasonal 0.7 mL 11/05/2018, 09/28/2019, 10/04/2021, 11/16/2021, 11/04/2022, 10/13/2023    Influenza, seasonal, injectable 1940, 09/10/2012    Pneumococcal Conjugate 13-Valent 11/17/2015    Pneumococcal Polysaccharide PPV23 11/02/2006    Td (adult), adsorbed 12/29/2010       Objective     /78 (BP Location: Left arm, Patient Position: Sitting, Cuff Size: Standard)   Pulse 87   Temp 97.8 °F (36.6 °C) (Temporal)   Resp 18   Ht 5' 7\" (1.702 m)   Wt " 75.8 kg (167 lb)   SpO2 98%   BMI 26.16 kg/m²     BP Readings from Last 3 Encounters:   05/07/24 130/78   01/26/24 149/77   10/13/23 132/70     Wt Readings from Last 3 Encounters:   05/07/24 75.8 kg (167 lb)   03/15/24 76 kg (167 lb 8.8 oz)   10/13/23 76 kg (167 lb 8 oz)           Physical Exam  Vitals and nursing note reviewed.   Constitutional:       General: She is not in acute distress.     Appearance: She is well-developed.   HENT:      Right Ear: Tympanic membrane and ear canal normal.      Left Ear: Tympanic membrane and ear canal normal.      Mouth/Throat:      Mouth: No oral lesions.      Pharynx: Oropharynx is clear.   Eyes:      General: No scleral icterus.     Extraocular Movements: Extraocular movements intact.      Conjunctiva/sclera: Conjunctivae normal.      Pupils: Pupils are equal, round, and reactive to light.   Neck:      Thyroid: No thyroid mass or thyromegaly.      Vascular: No carotid bruit or JVD.      Trachea: No tracheal deviation.   Cardiovascular:      Rate and Rhythm: Regular rhythm. Bradycardia present.      Heart sounds: Normal heart sounds. No murmur heard.     No gallop.      Comments: Apical HR 52.     No calf tenderness or Homans' sign.  Pedal pulses normal.  Pulmonary:      Effort: Pulmonary effort is normal. No respiratory distress.      Breath sounds: Normal breath sounds. No wheezing or rales.   Abdominal:      General: Bowel sounds are normal. There is no distension or abdominal bruit.      Palpations: Abdomen is soft. There is no hepatomegaly, splenomegaly or mass.      Tenderness: There is no abdominal tenderness. There is no guarding or rebound.   Musculoskeletal:      Right lower leg: No edema.      Left lower leg: No edema.   Lymphadenopathy:      Cervical: No cervical adenopathy.      Upper Body:      Right upper body: No supraclavicular adenopathy.      Left upper body: No supraclavicular adenopathy.   Skin:     Findings: No rash.      Nails: There is no clubbing.    Neurological:      General: No focal deficit present.      Mental Status: She is alert and oriented to person, place, and time.   Psychiatric:         Mood and Affect: Mood normal.       Valentino Roth MD

## 2024-05-09 ENCOUNTER — ANNUAL EXAM (OUTPATIENT)
Dept: OBGYN CLINIC | Facility: CLINIC | Age: 84
End: 2024-05-09
Payer: MEDICARE

## 2024-05-09 VITALS
HEART RATE: 57 BPM | WEIGHT: 160 LBS | DIASTOLIC BLOOD PRESSURE: 82 MMHG | SYSTOLIC BLOOD PRESSURE: 150 MMHG | BODY MASS INDEX: 25.06 KG/M2

## 2024-05-09 DIAGNOSIS — Z01.419 ENCOUNTER FOR GYNECOLOGICAL EXAMINATION WITHOUT ABNORMAL FINDING: ICD-10-CM

## 2024-05-09 DIAGNOSIS — Z12.31 ENCOUNTER FOR SCREENING MAMMOGRAM FOR MALIGNANT NEOPLASM OF BREAST: Primary | ICD-10-CM

## 2024-05-09 DIAGNOSIS — N95.2 ATROPHIC VAGINITIS: ICD-10-CM

## 2024-05-09 PROCEDURE — G0101 CA SCREEN;PELVIC/BREAST EXAM: HCPCS | Performed by: OBSTETRICS & GYNECOLOGY

## 2024-05-09 NOTE — PROGRESS NOTES
Assessment/Plan:    No problem-specific Assessment & Plan notes found for this encounter.       Diagnoses and all orders for this visit:    Encounter for screening mammogram for malignant neoplasm of breast  -     Mammo screening bilateral w 3d & cad; Future    Encounter for gynecological examination without abnormal finding    Atrophic vaginitis          Normal gynecological physical examination.  Self-breast examination stressed.  Mammogram ordered.  Discussed regular exercise, healthy diet, importance of vitamin D and calcium supplements.  Discussed importance of sun block use during periods of prolonged sun exposure.  Patient will be seen in 1 year for routine gynecologic and medical examination.  Patient will call office for any problems, concerns, or issues which may arise during the interim.     Subjective:          HPI    Patient ID: Rosa Maria Grant is a 84 y.o. female who presents today for her annual gynecologic and medical examination    Menstrual status: Patient denies any vaginal bleeding at this time    Vasomotor symptoms: Denies any significant vasomotor symptoms    Patient reports normal appetite    Patient reports normal bowel and bladder habits    Patient denies any significant pelvic or abdominal pain    Patient denies any headaches, chest pain, shortness of breath fever shakes or chills    Patient denies any COVID 19 symptoms including cough or loss of taste or smell    COVID vaccine status: Up-to-date with COVID vaccination protocols     Medical problems: Followed medically for blood pressure    Colonoscopy status: Completed colonoscopy screening    Mammogram status: Importance of self breast exam stressed to the patient and she is up-to-date with screening mammography.  Appropriate arrangements for her annual screening mammogram were placed into the EMR system at today's visit.    The following portions of the patient's history were reviewed and updated as appropriate: allergies, current  medications, past family history, past medical history, past social history, past surgical history and problem list.    Review of Systems   Constitutional: Negative.  Negative for appetite change, diaphoresis, fatigue, fever and unexpected weight change.   HENT: Negative.     Eyes: Negative.    Respiratory: Negative.     Cardiovascular: Negative.         Followed for blood pressure   Gastrointestinal: Negative.  Negative for abdominal pain, blood in stool, constipation, diarrhea, nausea and vomiting.   Endocrine: Negative.  Negative for cold intolerance and heat intolerance.   Genitourinary: Negative.  Negative for dysuria, frequency, hematuria, urgency, vaginal bleeding, vaginal discharge and vaginal pain.   Musculoskeletal: Negative.    Skin: Negative.    Allergic/Immunologic: Negative.    Neurological: Negative.    Hematological: Negative.  Negative for adenopathy.   Psychiatric/Behavioral: Negative.           Objective:      /82   Pulse 57   Wt 72.6 kg (160 lb)   BMI 25.06 kg/m²          Physical Exam  Constitutional:       General: She is not in acute distress.     Appearance: Normal appearance. She is well-developed. She is not diaphoretic.   HENT:      Head: Normocephalic and atraumatic.   Eyes:      Pupils: Pupils are equal, round, and reactive to light.   Cardiovascular:      Rate and Rhythm: Normal rate and regular rhythm.      Heart sounds: Normal heart sounds. No murmur heard.     No friction rub. No gallop.   Pulmonary:      Effort: Pulmonary effort is normal.      Breath sounds: Normal breath sounds.   Chest:   Breasts:     Breasts are symmetrical.      Right: No inverted nipple, mass, nipple discharge, skin change or tenderness.      Left: No inverted nipple, mass, nipple discharge, skin change or tenderness.   Abdominal:      General: Bowel sounds are normal.      Palpations: Abdomen is soft.   Genitourinary:     General: Normal vulva.      Exam position: Supine.      Labia:         Right:  No rash or lesion.         Left: No rash or lesion.       Urethra: No urethral swelling or urethral lesion.      Vagina: Normal. No vaginal discharge, erythema, tenderness or bleeding.      Cervix: No discharge or friability.      Uterus: Not enlarged and not tender.       Adnexa:         Right: No mass, tenderness or fullness.          Left: No mass, tenderness or fullness.        Rectum: Normal. Guaiac result negative.      Comments: Pelvic exam revealed mild atrophic vaginitis  Good pelvic support confirmed  Musculoskeletal:         General: Normal range of motion.      Cervical back: Normal range of motion and neck supple.   Lymphadenopathy:      Cervical: No cervical adenopathy.      Upper Body:      Right upper body: No supraclavicular adenopathy.      Left upper body: No supraclavicular adenopathy.   Skin:     General: Skin is warm and dry.      Findings: No rash.   Neurological:      General: No focal deficit present.      Mental Status: She is alert and oriented to person, place, and time.   Psychiatric:         Mood and Affect: Mood normal.         Speech: Speech normal.         Behavior: Behavior normal.         Thought Content: Thought content normal.         Judgment: Judgment normal.

## 2024-06-23 ENCOUNTER — OFFICE VISIT (OUTPATIENT)
Dept: URGENT CARE | Age: 84
End: 2024-06-23
Payer: MEDICARE

## 2024-06-23 VITALS
SYSTOLIC BLOOD PRESSURE: 166 MMHG | RESPIRATION RATE: 18 BRPM | DIASTOLIC BLOOD PRESSURE: 70 MMHG | TEMPERATURE: 97.6 F | OXYGEN SATURATION: 98 % | HEART RATE: 60 BPM

## 2024-06-23 DIAGNOSIS — L03.211 CELLULITIS OF FACE: Primary | ICD-10-CM

## 2024-06-23 PROCEDURE — 99213 OFFICE O/P EST LOW 20 MIN: CPT

## 2024-06-23 PROCEDURE — G0463 HOSPITAL OUTPT CLINIC VISIT: HCPCS

## 2024-06-23 RX ORDER — CLINDAMYCIN HYDROCHLORIDE 150 MG/1
450 CAPSULE ORAL EVERY 8 HOURS SCHEDULED
Qty: 63 CAPSULE | Refills: 0 | Status: SHIPPED | OUTPATIENT
Start: 2024-06-23 | End: 2024-06-30

## 2024-06-23 NOTE — PATIENT INSTRUCTIONS
Monitor site for increased redness outside of the marked area, change in rash, increased swelling, or drainage.     If this develops or you develop any fever, chills, aches, joint pain, swelling, headache, dizziness, numbness or any new or concerning symptoms please proceed to ER.    Take antibiotics as prescribed Clindamycin for 7 days  Take entire course of antibiotics.     Eat yogurt with live and active cultures and/or take a probiotic at least 3 hours before or after antibiotic dose.   Monitor stool for diarrhea and/or blood. If this occurs, contact primary care doctor ASAP.     Good hand hygiene  Avoid scratching area  Keep area clean and dry    Watch for signs of increased infection as previously discussed    If you develop any facial swelling, shortness of breath, difficulty breathing or any new or concerning symptoms please return or proceed ER.     Follow up with PCP in 3-5 days.

## 2024-06-23 NOTE — PROGRESS NOTES
[facility]        NAME: Rosa Maria Grant is a 84 y.o. female  : 1940    MRN: [id number]  DATE: 2024  TIME: 8:35 AM    Assessment and Plan   Cellulitis of face [L03.211]  1. Cellulitis of face  clindamycin (CLEOCIN) 150 mg capsule        Monitor site for increased redness outside of the marked area, change in rash, increased swelling, or drainage.     If this develops or you develop any fever, chills, aches, joint pain, swelling, headache, dizziness, numbness or any new or concerning symptoms please proceed to ER.    Take antibiotics as prescribed Clindamycin for 7 days  Take entire course of antibiotics.     Eat yogurt with live and active cultures and/or take a probiotic at least 3 hours before or after antibiotic dose.   Monitor stool for diarrhea and/or blood. If this occurs, contact primary care doctor ASAP.     Patient Instructions       Follow up with PCP in 3-5 days.  Proceed to  ER if symptoms worsen.    If tests have been performed at [facility], our office will contact you with results if changes need to be made to the care plan discussed with you at the visit.  [facility]    Chief Complaint     Chief Complaint   Patient presents with   • Wound Check     Patient states that 9 pm last night she noticed the right side of her face near her right eye is red, some swelling and warm to the touch         History of Present Illness       Pt is a 84 year old female presenting with 12 hours of redness and swelling to right side of face after sustaining a bug bite on the right cheek.  She denies fever or chills.  No changes in vision or hearing.  She has not taken anything for her symptoms.     Wound Check        Review of Systems   Review of Systems   Constitutional:  Negative for chills and fever.   Skin:  Positive for rash and wound.         Current Medications       Current Outpatient Medications:   •  clindamycin (CLEOCIN) 150 mg capsule, Take  3 capsules (450 mg total) by mouth every 8 (eight) hours for 7 days, Disp: 63 capsule, Rfl: 0  •  amLODIPine (NORVASC) 2.5 mg tablet, 2 tablets in AM. 1 tablet in PM., Disp: 270 tablet, Rfl: 1  •  Calcium Carb-Cholecalciferol 1000-800 MG-UNIT TABS, Take by mouth, Disp: , Rfl:   •  ciprofloxacin (CIPRO) 500 mg tablet, Take 500 mg by mouth 2 (two) times a day, Disp: , Rfl:   •  Cranberry 04420 MG CAPS, Take 1 capsule by mouth in the morning, Disp: , Rfl:   •  fluticasone (FLONASE) 50 mcg/act nasal spray, 1 spray into each nostril daily (Patient not taking: Reported on 5/9/2024), Disp: 9.9 mL, Rfl: 0  •  guaiFENesin (MUCINEX) 600 mg 12 hr tablet, Take 1 tablet (600 mg total) by mouth every 12 (twelve) hours (Patient not taking: Reported on 5/9/2024), Disp: 14 tablet, Rfl: 0  •  irbesartan (AVAPRO) 300 mg tablet, TAKE 1 TABLET BY MOUTH DAILY AT BEDTIME, Disp: 90 tablet, Rfl: 3  •  ketoconazole (NIZORAL) 2 % shampoo, Apply 1 application topically 2 (two) times a week, Disp: , Rfl:   •  methenamine hippurate (HIPREX) 1 g tablet, Take 0.5 g by mouth 2 (two) times a day, Disp: , Rfl:   •  metoprolol tartrate (LOPRESSOR) 25 mg tablet, Take 1 tablet (25 mg total) by mouth 2 (two) times a day, Disp: 180 tablet, Rfl: 3  •  multivitamin (THERAGRAN) TABS, Take 1 tablet by mouth, Disp: , Rfl:     Current Allergies     Allergies as of 06/23/2024 - Reviewed 06/23/2024   Allergen Reaction Noted   • Penicillins Rash 06/10/2005            The following portions of the patient's history were reviewed and updated as appropriate: allergies, current medications, past family history, past medical history, past social history, past surgical history and problem list.     Past Medical History:   Diagnosis Date   • Accelerated essential hypertension     last assessed 08/17/2015   • Allergic Pencillin   • Arthritis    • Depression with anxiety     last assesed 08/16/2012   • Hypertension    • Neutropenia (HCC) 01/08/2020   • Post-menopausal  bleeding 12/05/2013   • Strep pharyngitis 08/18/2019       Past Surgical History:   Procedure Laterality Date   • BLEPHAROPLASTY      upper lid w/excessive skin   • CARDIOVASCULAR STRESS TEST      onset June 2005   • COLONOSCOPY     • DILATION AND CURETTAGE, DIAGNOSTIC / THERAPEUTIC     • ENDOMETRIAL BIOPSY      negative for dysplasia, hyperplasia and malignancy, resolved 03/25/2013   • TUBAL LIGATION         Family History   Problem Relation Age of Onset   • Heart disease Father         cardiac disorder   • Skin cancer Father    • Colon cancer Sister 81   • Heart disease Family    • Hypertension Family    • Coronary artery disease Family    • No Known Problems Daughter    • No Known Problems Sister    • No Known Problems Sister    • No Known Problems Daughter    • No Known Problems Daughter    • No Known Problems Maternal Aunt    • No Known Problems Maternal Aunt    • No Known Problems Maternal Aunt    • No Known Problems Paternal Aunt    • No Known Problems Paternal Aunt    • No Known Problems Paternal Aunt    • No Known Problems Mother    • No Known Problems Maternal Grandmother    • No Known Problems Maternal Grandfather    • No Known Problems Paternal Grandmother    • No Known Problems Paternal Grandfather          Medications have been verified.        Objective   /70 (BP Location: Right arm, Patient Position: Sitting, Cuff Size: Standard)   Pulse 60   Temp 97.6 °F (36.4 °C)   Resp 18   SpO2 98%   No LMP recorded. Patient is postmenopausal.       Physical Exam     Physical Exam  Vitals and nursing note reviewed.   Constitutional:       General: She is not in acute distress.     Appearance: Normal appearance. She is normal weight. She is not ill-appearing.   HENT:      Right Ear: Tympanic membrane normal.      Left Ear: Tympanic membrane normal.      Nose: No congestion.      Mouth/Throat:      Mouth: Mucous membranes are moist.   Eyes:      General:         Right eye: No discharge.         Left eye:  No discharge.      Extraocular Movements: Extraocular movements intact.      Conjunctiva/sclera: Conjunctivae normal.      Pupils: Pupils are equal, round, and reactive to light.   Cardiovascular:      Rate and Rhythm: Normal rate.      Pulses: Normal pulses.   Pulmonary:      Effort: Pulmonary effort is normal. No respiratory distress.      Breath sounds: No stridor. No wheezing, rhonchi or rales.   Chest:      Chest wall: No tenderness.   Skin:     General: Skin is warm.             Comments: Redness to right side of face with mild swelling to cheek.  Small round areas of induration without drainage, mild tenderness to palpation, consistent with cellulitis.    Neurological:      Mental Status: She is alert.

## 2024-06-24 ENCOUNTER — TELEPHONE (OUTPATIENT)
Age: 84
End: 2024-06-24

## 2024-06-24 NOTE — TELEPHONE ENCOUNTER
Patient called and stated she was seen at  on 6/23 for facial cellulitis.Patient was ordered clindamycin.Patient was calling to have medication for the itching to be sent  to the pharmacy.Patient tried benadryl that just made her tired and didn't help with the itching.

## 2024-06-27 ENCOUNTER — TELEPHONE (OUTPATIENT)
Dept: FAMILY MEDICINE CLINIC | Facility: CLINIC | Age: 84
End: 2024-06-27

## 2024-06-27 NOTE — TELEPHONE ENCOUNTER
Called patient today after pre reg for tomorrow she went to [facility] on 6/25 and they gave her clinadamycin and she tried benadryl but it not helping  They are saying its cellulitis on her face and arms it is spreading on her elbow now and can't sleep at night because of the itching    She did not try zyrtec yet but is there something else she can take  go back to [facility] ER this time or can wait to see Dr. ALVARADO tomorrow

## 2024-06-28 ENCOUNTER — OFFICE VISIT (OUTPATIENT)
Dept: FAMILY MEDICINE CLINIC | Facility: CLINIC | Age: 84
End: 2024-06-28
Payer: MEDICARE

## 2024-06-28 VITALS
WEIGHT: 168 LBS | HEART RATE: 67 BPM | OXYGEN SATURATION: 96 % | TEMPERATURE: 97.5 F | BODY MASS INDEX: 26.37 KG/M2 | DIASTOLIC BLOOD PRESSURE: 82 MMHG | SYSTOLIC BLOOD PRESSURE: 148 MMHG | HEIGHT: 67 IN | RESPIRATION RATE: 18 BRPM

## 2024-06-28 DIAGNOSIS — L23.9 ALLERGIC DERMATITIS: Primary | ICD-10-CM

## 2024-06-28 PROCEDURE — G2211 COMPLEX E/M VISIT ADD ON: HCPCS | Performed by: FAMILY MEDICINE

## 2024-06-28 PROCEDURE — 99213 OFFICE O/P EST LOW 20 MIN: CPT | Performed by: FAMILY MEDICINE

## 2024-06-28 RX ORDER — PREDNISONE 10 MG/1
TABLET ORAL
Qty: 20 TABLET | Refills: 0 | OUTPATIENT
Start: 2024-06-28 | End: 2024-06-29

## 2024-06-28 NOTE — PROGRESS NOTES
"Ambulatory Visit  Name: Rosa Maria Grant      : 1940      MRN: [id number]  Encounter Provider: Patricia Leslie MD  Encounter Date: 2024   Encounter department: [facility]    Assessment & Plan   {There are no diagnoses linked to this encounter. (Refresh or delete this SmartLink)}     History of Present Illness   {Disappearing Hyperlinks I Encounters * My Last Note * Since Last Visit * History :89963}  HPI    Review of Systems  {Select to Display PMH (Optional):68871}  Objective   {Disappearing Hyperlinks   Review Vitals * Enter New Vitals * Results Review * Labs * Imaging * Cardiology * Procedures * Lung Cancer Screening :42563}  /82 (BP Location: Left arm, Patient Position: Sitting, Cuff Size: Standard)   Pulse 67   Temp 97.5 °F (36.4 °C) (Temporal)   Resp 18   Ht 5' 7\" (1.702 m)   Wt 76.2 kg (168 lb)   SpO2 96%   BMI 26.31 kg/m²     Physical Exam  Administrative Statements {Disappearing Hyperlinks I  Level of Service * PCMH/PCSP:50170}  {Time Spent Statement (Optional):25276}  "

## 2024-06-28 NOTE — PROGRESS NOTES
"Ambulatory Visit  Name: Rosa Maria Grant      : 1940      MRN: [id number]  Encounter Provider: Patricia Leslie MD  Encounter Date: 2024   Encounter department: [facility]    Assessment & Plan   1. Allergic dermatitis  -     predniSONE 10 mg tablet; Take 4 tablets (40 mg total) by mouth daily for 2 days, THEN 3 tablets (30 mg total) daily for 2 days, THEN 2 tablets (20 mg total) daily for 2 days, THEN 1 tablet (10 mg total) daily for 2 days.    Suspect redness on the right side of the face is allergic reaction.  She is taken 4 days of 40 mg of prednisone.  Will put her on another dose of prednisone tapered.  Continue Zyrtec in the morning and Benadryl at bedtime.  Do not suspect cellulitis or shingles.  Blood work on the  was within normal limits.  No lesions present.  Based on presentation and normal vital signs.  Counseled on ER precautions.  Follow-up in office if no improvement     History of Present Illness     Patient presents with:  Cellulitis: Last Saturday night she started with a rash all over her face, eyes was shut swollen , went to [facility] was told she has cellulitis, it started getting worst and got blood work done, they told her she was having an allergic reaction to something, it comes and goes.            Review of Systems   Constitutional:  Negative for fatigue.   HENT:  Positive for facial swelling.    Eyes:  Negative for photophobia, pain, discharge, redness, itching and visual disturbance.   Skin:  Positive for color change.        Denies any lesions        Objective     /82 (BP Location: Left arm, Patient Position: Sitting, Cuff Size: Standard)   Pulse 67   Temp 97.5 °F (36.4 °C) (Temporal)   Resp 18   Ht 5' 7\" (1.702 m)   Wt 76.2 kg (168 lb)   SpO2 96%   BMI 26.31 kg/m²     Physical Exam  HENT:      Head: Normocephalic and atraumatic.   Skin:     General: Skin is warm.      Findings: Erythema present.      Comments: Mild right-sided " facial swelling minimal erythema.  No lesions present       Administrative Statements

## 2024-06-29 ENCOUNTER — HOSPITAL ENCOUNTER (EMERGENCY)
Facility: HOSPITAL | Age: 84
Discharge: HOME | End: 2024-06-29
Attending: EMERGENCY MEDICINE
Payer: MEDICARE

## 2024-06-29 VITALS
WEIGHT: 165.57 LBS | BODY MASS INDEX: 25.93 KG/M2 | TEMPERATURE: 97.9 F | SYSTOLIC BLOOD PRESSURE: 153 MMHG | DIASTOLIC BLOOD PRESSURE: 70 MMHG | OXYGEN SATURATION: 95 % | RESPIRATION RATE: 16 BRPM | HEART RATE: 59 BPM

## 2024-06-29 DIAGNOSIS — L23.9 ALLERGIC DERMATITIS: Primary | ICD-10-CM

## 2024-06-29 DIAGNOSIS — N39.0 URINARY TRACT INFECTION WITHOUT HEMATURIA, SITE UNSPECIFIED: ICD-10-CM

## 2024-06-29 LAB
ANION GAP SERPL CALCULATED.3IONS-SCNC: 6 MMOL/L (ref 4–13)
BACTERIA UR QL AUTO: ABNORMAL /HPF
BASOPHILS # BLD AUTO: 0.04 THOUSANDS/ÂΜL (ref 0–0.1)
BASOPHILS NFR BLD AUTO: 0 % (ref 0–1)
BILIRUB UR QL STRIP: NEGATIVE
BUN SERPL-MCNC: 19 MG/DL (ref 5–25)
CALCIUM SERPL-MCNC: 9.1 MG/DL (ref 8.4–10.2)
CHLORIDE SERPL-SCNC: 101 MMOL/L (ref 96–108)
CLARITY UR: CLEAR
CO2 SERPL-SCNC: 28 MMOL/L (ref 21–32)
COLOR UR: COLORLESS
CREAT SERPL-MCNC: 0.75 MG/DL (ref 0.6–1.3)
EOSINOPHIL # BLD AUTO: 0.1 THOUSAND/ÂΜL (ref 0–0.61)
EOSINOPHIL NFR BLD AUTO: 1 % (ref 0–6)
ERYTHROCYTE [DISTWIDTH] IN BLOOD BY AUTOMATED COUNT: 11.9 % (ref 11.6–15.1)
GFR SERPL CREATININE-BSD FRML MDRD: 72 ML/MIN/1.73SQ M
GLUCOSE SERPL-MCNC: 114 MG/DL (ref 65–140)
GLUCOSE UR STRIP-MCNC: NEGATIVE MG/DL
HCT VFR BLD AUTO: 41.5 % (ref 34.8–46.1)
HGB BLD-MCNC: 13.8 G/DL (ref 11.5–15.4)
HGB UR QL STRIP.AUTO: ABNORMAL
IMM GRANULOCYTES # BLD AUTO: 0.05 THOUSAND/UL (ref 0–0.2)
IMM GRANULOCYTES NFR BLD AUTO: 0 % (ref 0–2)
KETONES UR STRIP-MCNC: NEGATIVE MG/DL
LEUKOCYTE ESTERASE UR QL STRIP: ABNORMAL
LYMPHOCYTES # BLD AUTO: 1.07 THOUSANDS/ÂΜL (ref 0.6–4.47)
LYMPHOCYTES NFR BLD AUTO: 8 % (ref 14–44)
MCH RBC QN AUTO: 30.8 PG (ref 26.8–34.3)
MCHC RBC AUTO-ENTMCNC: 33.3 G/DL (ref 31.4–37.4)
MCV RBC AUTO: 93 FL (ref 82–98)
MONOCYTES # BLD AUTO: 0.5 THOUSAND/ÂΜL (ref 0.17–1.22)
MONOCYTES NFR BLD AUTO: 4 % (ref 4–12)
NEUTROPHILS # BLD AUTO: 11.45 THOUSANDS/ÂΜL (ref 1.85–7.62)
NEUTS SEG NFR BLD AUTO: 87 % (ref 43–75)
NITRITE UR QL STRIP: NEGATIVE
NON-SQ EPI CELLS URNS QL MICRO: ABNORMAL /HPF
NRBC BLD AUTO-RTO: 0 /100 WBCS
PH UR STRIP.AUTO: 6.5 [PH]
PLATELET # BLD AUTO: 299 THOUSANDS/UL (ref 149–390)
PMV BLD AUTO: 9.5 FL (ref 8.9–12.7)
POTASSIUM SERPL-SCNC: 4.3 MMOL/L (ref 3.5–5.3)
PROT UR STRIP-MCNC: NEGATIVE MG/DL
RBC # BLD AUTO: 4.48 MILLION/UL (ref 3.81–5.12)
RBC #/AREA URNS AUTO: ABNORMAL /HPF
SODIUM SERPL-SCNC: 135 MMOL/L (ref 135–147)
SP GR UR STRIP.AUTO: 1.01 (ref 1–1.03)
UROBILINOGEN UR STRIP-ACNC: <2 MG/DL
WBC # BLD AUTO: 13.21 THOUSAND/UL (ref 4.31–10.16)
WBC #/AREA URNS AUTO: ABNORMAL /HPF

## 2024-06-29 PROCEDURE — 87086 URINE CULTURE/COLONY COUNT: CPT | Performed by: EMERGENCY MEDICINE

## 2024-06-29 PROCEDURE — 85025 COMPLETE CBC W/AUTO DIFF WBC: CPT | Performed by: EMERGENCY MEDICINE

## 2024-06-29 PROCEDURE — 36415 COLL VENOUS BLD VENIPUNCTURE: CPT | Performed by: EMERGENCY MEDICINE

## 2024-06-29 PROCEDURE — 80048 BASIC METABOLIC PNL TOTAL CA: CPT | Performed by: EMERGENCY MEDICINE

## 2024-06-29 PROCEDURE — 99284 EMERGENCY DEPT VISIT MOD MDM: CPT | Performed by: EMERGENCY MEDICINE

## 2024-06-29 PROCEDURE — 81001 URINALYSIS AUTO W/SCOPE: CPT | Performed by: EMERGENCY MEDICINE

## 2024-06-29 PROCEDURE — 99283 EMERGENCY DEPT VISIT LOW MDM: CPT

## 2024-06-29 RX ORDER — CIPROFLOXACIN 500 MG/1
500 TABLET, FILM COATED ORAL ONCE
Status: COMPLETED | OUTPATIENT
Start: 2024-06-29 | End: 2024-06-29

## 2024-06-29 RX ORDER — CIPROFLOXACIN 500 MG/1
500 TABLET, FILM COATED ORAL EVERY 12 HOURS
Qty: 20 TABLET | Refills: 0 | Status: SHIPPED | OUTPATIENT
Start: 2024-06-29 | End: 2024-07-09

## 2024-06-29 RX ADMIN — CIPROFLOXACIN 500 MG: 500 TABLET ORAL at 10:50

## 2024-06-29 NOTE — ED PROVIDER NOTES
History  Chief Complaint   Patient presents with    Arm Swelling     Patient dx with cellulitis of face and given antibiotics and steriods and facial swelling is better but now is having L arm swelling. Denies fevers.     Facial Swelling         Very pleasant 84-year-old female, has been having 1 week of on and off right-sided facial swelling and left arm swelling.,  Significantly improving now compared to previous initially was placed on antibiotic and was told it was cellulitis, they are also concerned about inflammatory reaction so she was also placed on prednisone, follow back up with PCP who thought it was more inflammatory, gave another prescription for prednisone for which she is currently tapering off of, face is greatly improved if not completely back to normal she is here with her daughter her daughter states her face looks completely normal to her the patient herself says she still feels she may have a little swelling there but the redness is gone, she still has a little bit of redness on her left arm, it is not pruritic it is not painful it is not warm/hot, asked extensively about new soaps detergents, etc., denies all of this with the exception of 2 weeks ago she had new floors installed in one of her rooms not sure if there is a reaction to chemicals that they used for that.  Otherwise no new foods.  Patient does have a history of prediabetes, diet controlled at this point, has noticed that since being on the current medications including prednisone she has had some polyuria.  Patient is prone to UTIs, has Cipro at home for which she typically takes if she feels the UTI coming on, has not been taking it as she was told that there is an interaction between Cipro and prednisone.          Prior to Admission Medications   Prescriptions Last Dose Informant Patient Reported? Taking?   Calcium Carb-Cholecalciferol 1000-800 MG-UNIT TABS  Self Yes No   Sig: Take by mouth   Cranberry 21018 MG CAPS  Self Yes No    Sig: Take 1 capsule by mouth in the morning   amLODIPine (NORVASC) 2.5 mg tablet  Self No No   Si tablets in AM. 1 tablet in PM.   clindamycin (CLEOCIN) 150 mg capsule  Self No No   Sig: Take 3 capsules (450 mg total) by mouth every 8 (eight) hours for 7 days   irbesartan (AVAPRO) 300 mg tablet  Self No No   Sig: TAKE 1 TABLET BY MOUTH DAILY AT BEDTIME   ketoconazole (NIZORAL) 2 % shampoo  Self Yes No   Sig: Apply 1 application topically 2 (two) times a week   methenamine hippurate (HIPREX) 1 g tablet  Self Yes No   Sig: Take 0.5 g by mouth 2 (two) times a day   metoprolol tartrate (LOPRESSOR) 25 mg tablet  Self No No   Sig: Take 1 tablet (25 mg total) by mouth 2 (two) times a day   multivitamin (THERAGRAN) TABS  Self Yes No   Sig: Take 1 tablet by mouth   predniSONE 10 mg tablet   No No   Sig: Take 4 tablets (40 mg total) by mouth daily for 2 days, THEN 3 tablets (30 mg total) daily for 2 days, THEN 2 tablets (20 mg total) daily for 2 days, THEN 1 tablet (10 mg total) daily for 2 days.      Facility-Administered Medications: None       Past Medical History:   Diagnosis Date    Accelerated essential hypertension     last assessed 2015    Allergic Pencillin    Arthritis     Depression with anxiety     last assesed 2012    Hypertension     Neutropenia (HCC) 2020    Post-menopausal bleeding 2013    Strep pharyngitis 2019       Past Surgical History:   Procedure Laterality Date    BLEPHAROPLASTY      upper lid w/excessive skin    CARDIOVASCULAR STRESS TEST      onset 2005    COLONOSCOPY      DILATION AND CURETTAGE, DIAGNOSTIC / THERAPEUTIC      ENDOMETRIAL BIOPSY      negative for dysplasia, hyperplasia and malignancy, resolved 2013    TUBAL LIGATION         Family History   Problem Relation Age of Onset    Heart disease Father         cardiac disorder    Skin cancer Father     Colon cancer Sister 81    Heart disease Family     Hypertension Family     Coronary artery  disease Family     No Known Problems Daughter     No Known Problems Sister     No Known Problems Sister     No Known Problems Daughter     No Known Problems Daughter     No Known Problems Maternal Aunt     No Known Problems Maternal Aunt     No Known Problems Maternal Aunt     No Known Problems Paternal Aunt     No Known Problems Paternal Aunt     No Known Problems Paternal Aunt     No Known Problems Mother     No Known Problems Maternal Grandmother     No Known Problems Maternal Grandfather     No Known Problems Paternal Grandmother     No Known Problems Paternal Grandfather      I have reviewed and agree with the history as documented.    E-Cigarette/Vaping     E-Cigarette/Vaping Substances     Social History     Tobacco Use    Smoking status: Never    Smokeless tobacco: Never   Substance Use Topics    Alcohol use: Yes     Comment: 2 or 3 cocktails a year    Drug use: No       Review of Systems   Constitutional:  Negative for activity change, chills, diaphoresis and fever.   HENT:  Negative for congestion, sinus pressure and sore throat.    Eyes:  Negative for pain and visual disturbance.   Respiratory:  Negative for cough, chest tightness, shortness of breath, wheezing and stridor.    Cardiovascular:  Negative for chest pain and palpitations.   Gastrointestinal:  Negative for abdominal distention, abdominal pain, constipation, diarrhea, nausea and vomiting.   Genitourinary:  Negative for dysuria and frequency.   Musculoskeletal:  Negative for neck pain and neck stiffness.   Skin:  Positive for rash.   Neurological:  Negative for dizziness, speech difficulty, light-headedness, numbness and headaches.       Physical Exam  Physical Exam  Vitals reviewed.   Constitutional:       General: She is not in acute distress.     Appearance: She is well-developed. She is not diaphoretic.   HENT:      Head: Normocephalic and atraumatic.      Right Ear: External ear normal.      Left Ear: External ear normal.      Nose: Nose  normal.   Eyes:      General:         Right eye: No discharge.         Left eye: No discharge.      Pupils: Pupils are equal, round, and reactive to light.   Neck:      Trachea: No tracheal deviation.   Cardiovascular:      Rate and Rhythm: Normal rate and regular rhythm.      Heart sounds: Normal heart sounds. No murmur heard.  Pulmonary:      Effort: Pulmonary effort is normal. No respiratory distress.      Breath sounds: Normal breath sounds. No stridor.   Abdominal:      General: There is no distension.      Palpations: Abdomen is soft.      Tenderness: There is no abdominal tenderness. There is no guarding or rebound.   Musculoskeletal:         General: Normal range of motion.      Cervical back: Normal range of motion and neck supple.   Skin:     General: Skin is warm and dry.      Coloration: Skin is not pale.      Findings: Erythema present.      Comments: Left arm, erythema along the right inner aspect over the medial condyle/antecubital fossa area, not warm not raised not pruritic when I touch,   Neurological:      General: No focal deficit present.      Mental Status: She is alert and oriented to person, place, and time.   Psychiatric:         Mood and Affect: Mood is anxious.         Vital Signs  ED Triage Vitals   Temperature Pulse Respirations Blood Pressure SpO2   06/29/24 0858 06/29/24 0858 06/29/24 0858 06/29/24 0859 06/29/24 0859   97.9 °F (36.6 °C) 70 20 (!) 234/100 97 %      Temp Source Heart Rate Source Patient Position - Orthostatic VS BP Location FiO2 (%)   06/29/24 0858 06/29/24 0858 06/29/24 0858 06/29/24 0858 --   Oral Monitor Lying Right arm       Pain Score       --                  Vitals:    06/29/24 0858 06/29/24 0859 06/29/24 0929 06/29/24 1004   BP:  (!) 234/100 162/77 157/71   Pulse: 70   59   Patient Position - Orthostatic VS: Lying Lying  Sitting         Visual Acuity      ED Medications  Medications   ciprofloxacin (CIPRO) tablet 500 mg (500 mg Oral Given 6/29/24 1050)        Diagnostic Studies  Results Reviewed       Procedure Component Value Units Date/Time    Urine Microscopic [[id number]]  (Abnormal) Collected: 06/29/24 0927    Lab Status: Final result Specimen: Urine, Clean Catch Updated: 06/29/24 1040     RBC, UA 1-2 /hpf      WBC, UA 30-50 /hpf      Epithelial Cells None Seen /hpf      Bacteria, UA Occasional /hpf     Urine culture [[id number]] Collected: 06/29/24 0927    Lab Status: In process Specimen: Urine, Clean Catch Updated: 06/29/24 1040    UA w Reflex to Microscopic w Reflex to Culture [[id number]]  (Abnormal) Collected: 06/29/24 0927    Lab Status: Final result Specimen: Urine, Clean Catch Updated: 06/29/24 1035     Color, UA Colorless     Clarity, UA Clear     Specific Gravity, UA 1.007     pH, UA 6.5     Leukocytes, UA Moderate     Nitrite, UA Negative     Protein, UA Negative mg/dl      Glucose, UA Negative mg/dl      Ketones, UA Negative mg/dl      Urobilinogen, UA <2.0 mg/dl      Bilirubin, UA Negative     Occult Blood, UA Trace    Basic metabolic panel [[id number]] Collected: 06/29/24 0927    Lab Status: Final result Specimen: Blood from Arm, Right Updated: 06/29/24 1012     Sodium 135 mmol/L      Potassium 4.3 mmol/L      Chloride 101 mmol/L      CO2 28 mmol/L      ANION GAP 6 mmol/L      BUN 19 mg/dL      Creatinine 0.75 mg/dL      Glucose 114 mg/dL      Calcium 9.1 mg/dL      eGFR 72 ml/min/1.73sq m     Narrative:      National Kidney Disease Foundation guidelines for Chronic Kidney Disease (CKD):     Stage 1 with normal or high GFR (GFR > 90 mL/min/1.73 square meters)    Stage 2 Mild CKD (GFR = 60-89 mL/min/1.73 square meters)    Stage 3A Moderate CKD (GFR = 45-59 mL/min/1.73 square meters)    Stage 3B Moderate CKD (GFR = 30-44 mL/min/1.73 square meters)    Stage 4 Severe CKD (GFR = 15-29 mL/min/1.73 square meters)    Stage 5 End Stage CKD (GFR <15 mL/min/1.73 square meters)  Note: GFR calculation is accurate only with a steady state creatinine    CBC and  differential [[id number]]  (Abnormal) Collected: 06/29/24 0927    Lab Status: Final result Specimen: Blood from Arm, Right Updated: 06/29/24 0938     WBC 13.21 Thousand/uL      RBC 4.48 Million/uL      Hemoglobin 13.8 g/dL      Hematocrit 41.5 %      MCV 93 fL      MCH 30.8 pg      MCHC 33.3 g/dL      RDW 11.9 %      MPV 9.5 fL      Platelets 299 Thousands/uL      nRBC 0 /100 WBCs      Segmented % 87 %      Immature Grans % 0 %      Lymphocytes % 8 %      Monocytes % 4 %      Eosinophils Relative 1 %      Basophils Relative 0 %      Absolute Neutrophils 11.45 Thousands/µL      Absolute Immature Grans 0.05 Thousand/uL      Absolute Lymphocytes 1.07 Thousands/µL      Absolute Monocytes 0.50 Thousand/µL      Eosinophils Absolute 0.10 Thousand/µL      Basophils Absolute 0.04 Thousands/µL                    No orders to display              Procedures  Procedures         ED Course  ED Course as of 06/29/24 1056   Sat Jun 29, 2024   0959 WBC(!): 13.21  Leukocytosis, left shift, patient currently on prednisone likely accounting for this   1000 Blood pressure significantly decreased without intervention likely caused by anxiety initially will continue to monitor   1047 UTI, will treat with Cipro as this is what she has had multiple times in the past and is comfortable with it.,  Advised about tendinopathy, prednisone has been discontinued                                             Medical Decision Making        Initial ED assessment:   84-year-old female, nondescript erythema over left arm, also having intermittent swelling currently on prednisone history of prediabetes, hypertensive here with a blood pressure of 234/100, she did take her medications this morning but she is visually very anxious.  Patient is having some polyuria prone to UTIs    Initial DDx includes but is not limited to:    Allergic reaction, swelling from prednisone, no evidence of cellulitis on exam.  Patient is hypertensive, combination of anxiety plus  prednisone plus baseline hypertension.  Polyuria could be from UTI or from glycosuria from prednisone    Initial ED plan:   UA, CBC, BMP, will monitor blood pressure for now may require extra doses of medicine        Final ED summary/disposition:   After evaluation and workup in the emergency department, found of UTI, unclear exact etiology of her rash, advised to discontinue prednisone patient discharged reassurance given    Amount and/or Complexity of Data Reviewed  Labs: ordered. Decision-making details documented in ED Course.    Risk  Prescription drug management.             Disposition  Final diagnoses:   Allergic dermatitis   Urinary tract infection without hematuria, site unspecified     Time reflects when diagnosis was documented in both MDM as applicable and the Disposition within this note       Time User Action Codes Description Comment    6/29/2024 10:30 AM Jose Mcclure [L23.9] Allergic dermatitis     6/29/2024 10:45 AM Jose Mcclure [N39.0] Urinary tract infection without hematuria, site unspecified           ED Disposition       ED Disposition   Discharge    Condition   Stable    Date/Time   Sat Jun 29, 2024 10:30 AM    Comment   Rosa Maria Grant discharge to home/self care.                   Follow-up Information    None         Patient's Medications   Discharge Prescriptions    CIPROFLOXACIN (CIPRO) 500 MG TABLET    Take 1 tablet (500 mg total) by mouth every 12 (twelve) hours for 10 days       Start Date: 6/29/2024 End Date: 7/9/2024       Order Dose: 500 mg       Quantity: 20 tablet    Refills: 0       No discharge procedures on file.    PDMP Review       None            ED Provider  Electronically Signed by             Jose Mcclure DO  06/29/24 1216

## 2024-06-30 LAB — BACTERIA UR CULT: NORMAL

## 2024-07-02 ENCOUNTER — OFFICE VISIT (OUTPATIENT)
Dept: FAMILY MEDICINE CLINIC | Facility: CLINIC | Age: 84
End: 2024-07-02
Payer: MEDICARE

## 2024-07-02 VITALS
OXYGEN SATURATION: 98 % | RESPIRATION RATE: 16 BRPM | HEART RATE: 76 BPM | BODY MASS INDEX: 26.21 KG/M2 | SYSTOLIC BLOOD PRESSURE: 144 MMHG | WEIGHT: 167 LBS | DIASTOLIC BLOOD PRESSURE: 82 MMHG | TEMPERATURE: 97.6 F | HEIGHT: 67 IN

## 2024-07-02 DIAGNOSIS — I10 PRIMARY HYPERTENSION: ICD-10-CM

## 2024-07-02 DIAGNOSIS — L50.0 ALLERGIC URTICARIA: ICD-10-CM

## 2024-07-02 DIAGNOSIS — L30.9 DERMATITIS: ICD-10-CM

## 2024-07-02 DIAGNOSIS — R73.03 PREDIABETES: ICD-10-CM

## 2024-07-02 PROCEDURE — G2211 COMPLEX E/M VISIT ADD ON: HCPCS | Performed by: FAMILY MEDICINE

## 2024-07-02 PROCEDURE — 99214 OFFICE O/P EST MOD 30 MIN: CPT | Performed by: FAMILY MEDICINE

## 2024-07-02 NOTE — PROGRESS NOTES
Ambulatory Visit  Name: Rosa Maria Grant      : 1940      MRN: [id number]  Encounter Provider: Valentino Roth MD  Encounter Date: 2024   Encounter department: [facility]    Assessment & Plan   1. Dermatitis-recurrent  2. Allergic urticaria  -     [facility] Allergy Panel, Adult; Future  -     Food Allergy Profile; Future  3. Prediabetes  4. Primary hypertension    Continue with current medications including Zyrtec 10 mg in a.m. and as needed Benadryl.  Topical steroid cream twice daily to affected area.  Allergy testing ordered.  Follow-up once results are back.  Call if any changes         History of Present Illness     Follow-up visit for ongoing recurrent pruritic dermatitis.  Patient was initially seen in urgent care with facial swelling/rash. She was treated for facial cellulitis with Clindamycin.  She developed pruritic rash on left arm and was seen in the emergency room at [facility]. She was prescribed prednisone CBC at that time was normal.  She was seen here and was continued on prednisone along with OTC antihistamines.  She went back to the emergency room at [facility] and repeat CBC showed a mild leukocytosis likely secondary to oral steroids.  Urinalysis showed leukocytes urine culture mixed contaminant.  She took several doses of Cipro she is currently asymptomatic.  She has a new rash pruritic right lower abdomen area.  Current medications reviewed on Zyrtec 10 mg daily AM with PRN Benadryl   No history of seasonal allergies. Recent cat exposure     Hypertension-blood pressures have stable on Amlodipine 5 mg AM and 2.5 mg PM, Irbesartan 300 mg daily and Metoprolol 50 mg BID. Past history of hyponatremia and hypokalemia while on HCTZ.   Labs 2024  creatinine 0.85. GFR 62.  Electrolytes normal. Na+ 138. K+ 4.0.  Hgb 13.4.     Prediabetes FBS 97. A1c 5.9. + FH type DM.     2020 Lipid profile cholesterol 187. TGs 88. HDL 55. . LFTs normal.       Lab Results    Component Value Date    WBC 13.21 (H) 06/29/2024    HGB 13.8 06/29/2024    HCT 41.5 06/29/2024    MCV 93 06/29/2024     06/29/2024     Lab Results   Component Value Date     06/19/2015    SODIUM 135 06/29/2024    K 4.3 06/29/2024     06/29/2024    CO2 28 06/29/2024    ANIONGAP 4 06/11/2015    AGAP 6 06/29/2024    BUN 19 06/29/2024    CREATININE 0.75 06/29/2024    GLUC 114 06/29/2024    GLUF 97 05/03/2024    CALCIUM 9.1 06/29/2024    AST 17 05/03/2024    ALT 16 05/03/2024    ALKPHOS 49 05/03/2024    PROT 6.7 06/11/2015    TP 6.4 05/03/2024    BILITOT 0.46 06/11/2015    TBILI 0.71 05/03/2024    EGFR 72 06/29/2024     Lab Results   Component Value Date    BYG3LULCYTGC 3.707 05/03/2024     Lab Results   Component Value Date    HGBA1C 5.9 (H) 05/03/2024         Review of Systems   Constitutional:  Negative for appetite change, chills, fatigue, fever and unexpected weight change.   HENT:  Negative for congestion, ear pain, hearing loss, rhinorrhea, sore throat and trouble swallowing.         Chronic nasal congestion improved with Flonase.    Eyes:  Negative for visual disturbance.        Cataract surgery OU    Respiratory:  Negative for cough, shortness of breath and wheezing.    Cardiovascular:  Negative for chest pain, palpitations and leg swelling.        01/2020 admission for substernal chest pain. EKG NSR with frequent PVCs. she was sent to [facility]. Initial EKG NSR with fusion complexes. No acute changes. 2nd EKG sinus bradycardia with PACs. troponins x 3 negative.  Chest x-ray no active disease.  Stress echocardiogram normal.  EF 60%.   Gastrointestinal:  Negative for abdominal pain, blood in stool, constipation, diarrhea, nausea and vomiting.        Episodes abdominal discomfort after eating. Lactose intolerant. Recently ate pasta with len sauce 3 days in a row. No reflux. No dysphagia. Chronic constipation. Colonoscopy 03/2019 normal except for diverticulosis. + FH colon CA sister.      Endocrine: Negative for polydipsia and polyuria.        Phx subclinical hypothyroidism  TSH 07/2017  thyroid ABs negative.   02/2015 DEXA scan normal.    Lab Results       Component                Value               Date                       [id number]             3.707               05/03/2024                Lab Results       Component                Value               Date                       [id number]             4.190               02/27/2023        Lab Results       Component                Value               Date                       [id number]             4.590 (H)           08/04/2022                                      Genitourinary:  Negative for difficulty urinating, dysuria and hematuria.        History of recurrent UTIs followed by Urology. On Hiprex.  02/2019 kidney bladder ultrasound normal minimal PVR. 29 ML.  Simple cyst left adnexa without significant change.  pelvic u/s 08/2016 stable simple left ovarian cyst. 01/2017  normal at 9.5 followed by GYN.,    Musculoskeletal:  Positive for back pain. Negative for arthralgias and myalgias.   Skin:  Negative for rash.   Allergic/Immunologic: Positive for environmental allergies.   Neurological:  Negative for dizziness and headaches.   Hematological:  Negative for adenopathy. Does not bruise/bleed easily.        Past history of mild leukopenia  06/2018  Vitamin B12 589.  Folate greater than 20.     Lab Results       Component                Value               Date                       WBC                      4.80                09/26/2023                 HGB                      13.1                09/26/2023                 HCT                      40.5                09/26/2023                 MCV                      93                  09/26/2023                 PLT                      280                 09/26/2023                                                 WBC       Date                     Value               Ref  Range           Status                06/29/2021               5.45                4.31 - 10.16 T*     Final                 12/14/2020               4.47                4.31 - 10.16 T*     Final                 05/27/2020               5.07                4.31 - 10.16 T*     Final                 06/11/2015               4.75                4.31 - 10.16 T*     Final                 04/02/2014               5.24                4.31 - 10.16 T*     Final                          Psychiatric/Behavioral:  Negative for dysphoric mood and sleep disturbance. The patient is not nervous/anxious.      Past Medical History:   Diagnosis Date    Accelerated essential hypertension     last assessed 08/17/2015    Allergic Pencillin    Arthritis     Depression with anxiety     last assesed 08/16/2012    Hypertension     Neutropenia (HCC) 01/08/2020    Post-menopausal bleeding 12/05/2013    Strep pharyngitis 08/18/2019     Past Surgical History:   Procedure Laterality Date    BLEPHAROPLASTY      upper lid w/excessive skin    CARDIOVASCULAR STRESS TEST      onset June 2005    COLONOSCOPY      DILATION AND CURETTAGE, DIAGNOSTIC / THERAPEUTIC      ENDOMETRIAL BIOPSY      negative for dysplasia, hyperplasia and malignancy, resolved 03/25/2013    TUBAL LIGATION       Family History   Problem Relation Age of Onset    Heart disease Father         cardiac disorder    Skin cancer Father     Colon cancer Sister 81    Heart disease Family     Hypertension Family     Coronary artery disease Family     No Known Problems Daughter     No Known Problems Sister     No Known Problems Sister     No Known Problems Daughter     No Known Problems Daughter     No Known Problems Maternal Aunt     No Known Problems Maternal Aunt     No Known Problems Maternal Aunt     No Known Problems Paternal Aunt     No Known Problems Paternal Aunt     No Known Problems Paternal Aunt     No Known Problems Mother     No Known Problems Maternal Grandmother     No Known  Problems Maternal Grandfather     No Known Problems Paternal Grandmother     No Known Problems Paternal Grandfather      Social History     Tobacco Use    Smoking status: Never    Smokeless tobacco: Never   Substance and Sexual Activity    Alcohol use: Yes     Comment: 2 or 3 cocktails a year    Drug use: No    Sexual activity: Not on file     Current Outpatient Medications on File Prior to Visit   Medication Sig    amLODIPine (NORVASC) 2.5 mg tablet 2 tablets in AM. 1 tablet in PM.    Calcium Carb-Cholecalciferol 1000-800 MG-UNIT TABS Take by mouth    ciprofloxacin (CIPRO) 500 mg tablet Take 1 tablet (500 mg total) by mouth every 12 (twelve) hours for 10 days    Cranberry 35907 MG CAPS Take 1 capsule by mouth in the morning    irbesartan (AVAPRO) 300 mg tablet TAKE 1 TABLET BY MOUTH DAILY AT BEDTIME    ketoconazole (NIZORAL) 2 % shampoo Apply 1 application topically 2 (two) times a week    methenamine hippurate (HIPREX) 1 g tablet Take 0.5 g by mouth 2 (two) times a day    metoprolol tartrate (LOPRESSOR) 25 mg tablet Take 1 tablet (25 mg total) by mouth 2 (two) times a day    multivitamin (THERAGRAN) TABS Take 1 tablet by mouth     Allergies   Allergen Reactions    Penicillins Rash     Immunization History   Administered Date(s) Administered    COVID-19 MODERNA VACC 0.5 ML IM 02/03/2021, 03/03/2021, 11/02/2021, 07/15/2022    COVID-19 Moderna Vac BIVALENT 12 Yr+ IM 0.5 ML 12/02/2022    INFLUENZA 09/28/2019, 10/05/2020    Influenza Split High Dose Preservative Free IM 09/18/2013, 09/23/2014, 11/17/2015, 11/30/2016, 12/12/2017, 10/05/2020    Influenza, high dose seasonal 0.7 mL 11/05/2018, 09/28/2019, 10/04/2021, 11/16/2021, 11/04/2022, 10/13/2023    Influenza, seasonal, injectable 1940, 09/10/2012    Pneumococcal Conjugate 13-Valent 11/17/2015    Pneumococcal Polysaccharide PPV23 11/02/2006    Td (adult), adsorbed 12/29/2010     Objective     /82 (BP Location: Left arm, Patient Position: Sitting, Cuff  "Size: Standard)   Pulse 76   Temp 97.6 °F (36.4 °C) (Temporal)   Resp 16   Ht 5' 7\" (1.702 m)   Wt 75.8 kg (167 lb)   SpO2 98%   BMI 26.16 kg/m²     BP Readings from Last 3 Encounters:   07/02/24 144/82   06/29/24 153/70   06/28/24 148/82      Wt Readings from Last 3 Encounters:   07/02/24 75.8 kg (167 lb)   06/29/24 75.1 kg (165 lb 9.1 oz)   06/28/24 76.2 kg (168 lb)           Physical Exam  Constitutional:       General: She is not in acute distress.  HENT:      Head:      Comments: No facial swelling.     Right Ear: Tympanic membrane and ear canal normal.      Left Ear: Tympanic membrane and ear canal normal.      Nose:      Comments: No sinus tenderness      Mouth/Throat:      Lips: No lesions.      Mouth: No oral lesions.      Pharynx: Oropharynx is clear.   Eyes:      Conjunctiva/sclera: Conjunctivae normal.   Neck:      Thyroid: No thyroid mass or thyromegaly.   Cardiovascular:      Rate and Rhythm: Normal rate and regular rhythm.      Heart sounds: No murmur heard.     No gallop.   Pulmonary:      Effort: No respiratory distress.      Breath sounds: No wheezing or rales.   Lymphadenopathy:      Cervical: No cervical adenopathy.      Upper Body:      Right upper body: No supraclavicular adenopathy.      Left upper body: No supraclavicular adenopathy.   Skin:     Findings: Rash present.      Comments: Diffuse erythematous macular rash right lower abdomen   Neurological:      Mental Status: She is alert and oriented to person, place, and time.   Psychiatric:         Mood and Affect: Mood normal.       Administrative Statements         "

## 2024-07-05 ENCOUNTER — APPOINTMENT (OUTPATIENT)
Dept: LAB | Age: 84
End: 2024-07-05
Payer: MEDICARE

## 2024-07-05 DIAGNOSIS — L50.0 ALLERGIC URTICARIA: ICD-10-CM

## 2024-07-05 PROCEDURE — 82785 ASSAY OF IGE: CPT

## 2024-07-05 PROCEDURE — 86003 ALLG SPEC IGE CRUDE XTRC EA: CPT

## 2024-07-05 PROCEDURE — 36415 COLL VENOUS BLD VENIPUNCTURE: CPT

## 2024-07-07 LAB
A ALTERNATA IGE QN: <0.1 KUA/I
A FUMIGATUS IGE QN: <0.1 KUA/I
ALMOND IGE QN: <0.1 KUA/I
BERMUDA GRASS IGE QN: <0.1 KUA/I
BOXELDER IGE QN: <0.1 KUA/I
C HERBARUM IGE QN: <0.1 KUA/I
CASHEW NUT IGE QN: <0.1 KUA/I
CAT DANDER IGE QN: <0.1 KUA/I
CMN PIGWEED IGE QN: <0.1 KUA/I
CODFISH IGE QN: <0.1 KUA/I
COMMON RAGWEED IGE QN: <0.1 KUA/I
COTTONWOOD IGE QN: <0.1 KUA/I
D FARINAE IGE QN: <0.1 KUA/I
D PTERONYSS IGE QN: <0.1 KUA/I
DOG DANDER IGE QN: <0.1 KUA/I
EGG WHITE IGE QN: <0.1 KUA/I
GLUTEN IGE QN: <0.1 KUA/I
HAZELNUT IGE QN: <0.1 KUA/L
LONDON PLANE IGE QN: <0.1 KUA/I
MILK IGE QN: <0.1 KUA/I
MOUSE URINE PROT IGE QN: <0.1 KUA/I
MT JUNIPER IGE QN: <0.1 KUA/I
MUGWORT IGE QN: <0.1 KUA/I
P NOTATUM IGE QN: <0.1 KUA/I
PEANUT IGE QN: <0.1 KUA/I
ROACH IGE QN: <0.1 KUA/I
SALMON IGE QN: <0.1 KUA/I
SCALLOP IGE QN: <0.1 KUA/L
SESAME SEED IGE QN: <0.1 KUA/I
SHEEP SORREL IGE QN: <0.1 KUA/I
SHRIMP IGE QN: <0.1 KUA/L
SILVER BIRCH IGE QN: <0.1 KUA/I
SOYBEAN IGE QN: <0.1 KUA/I
TIMOTHY IGE QN: <0.1 KUA/I
TOTAL IGE SMQN RAST: 5.49 KU/L (ref 0–113)
TOTAL IGE SMQN RAST: 5.95 KU/L (ref 0–113)
TUNA IGE QN: <0.1 KUA/I
WALNUT IGE QN: <0.1 KUA/I
WALNUT IGE QN: <0.1 KUA/I
WHEAT IGE QN: <0.1 KUA/I
WHITE ASH IGE QN: <0.1 KUA/I
WHITE ELM IGE QN: <0.1 KUA/I
WHITE MULBERRY IGE QN: <0.1 KUA/I
WHITE OAK IGE QN: <0.1 KUA/I

## 2024-08-12 NOTE — PROGRESS NOTES
Assessment    1  Encounter for preventive health examination (V70 0) (Z00 00)   2  Hypertension (401 9) (I10)   3  Hyperlipidemia (272 4) (E78 5)   4  Osteopenia (733 90) (M85 80)   5  Subclinical hypothyroidism (244 8) (E03 9)   6  Cyst of left ovary (620 2) (N83 202)    Plan  Cyst of left ovary, Health Maintenance, Hyperlipidemia, Hypertension, Osteopenia,Subclinical hypothyroidism    · (1) CBC/PLT/DIFF; Status:Active; Requested for:14May2018;    · (1) COMPREHENSIVE METABOLIC PANEL; Status:Active; Requested for:14May2018;    · (1) LIPID PANEL, FASTING; Status:Active; Requested for:14May2018;    · (1) TSH WITH FT4 REFLEX; Status:Active; Requested for:14May2018;    · (1) VITAMIN D 25-HYDROXY; Status:Active; Requested for:14May2018;   Hypertension    · Follow-up visit in 6 months Evaluation and Treatment  Follow-up  Status: Hold For -Scheduling  Requested for: 24Ybv6176   · Eat a low fat and low cholesterol diet ; Status:Complete;   Done: 55KQE7532 10:17AM   · We encourage you to begin to make lifestyle changes to help control your bloodpressure  These may include losing weight, increasing your activity level, limiting salt inyour diet, decreasing alcohol intake, and eating a diet low in fat and rich in fruitsand vegetables ; Status:Complete;   Done: 20GUY2339 10:17AM  Need for prophylactic vaccination and inoculation against influenza    · Fluzone High-Dose 0 5 ML Intramuscular Suspension Prefilled Syringe    Discussion/Summary    Continue with current medications monitor BPs at home  high-dose flu vaccine today  repeat labs prior to OV in 6 months  Possible side effects of new medications were reviewed with the patient/guardian today  The treatment plan was reviewed with the patient/guardian  The patient/guardian understands and agrees with the treatment plan      History of Present Illness  Followup visit  medications reviewed  labs 6/2017 see note  hypertension BPs have been stable on current 3 drug regimen  Initial Clinical Review    Admission: Date/Time/Statement:   Admission Orders (From admission, onward)       Ordered        08/11/24 1125  Inpatient Admission  Once                          Orders Placed This Encounter   Procedures    Inpatient Admission     Standing Status:   Standing     Number of Occurrences:   1     Order Specific Question:   Level of Care     Answer:   Med Surg [16]     Order Specific Question:   Estimated length of stay     Answer:   More than 2 Midnights     Order Specific Question:   Certification     Answer:   I certify that inpatient services are medically necessary for this patient for a duration of greater than two midnights. See H&P and MD Progress Notes for additional information about the patient's course of treatment.     ED Arrival Information       Expected   -    Arrival   8/11/2024 08:28    Acuity   Emergent              Means of arrival   Wheelchair    Escorted by   Family Member    Service   Trauma    Admission type   Emergency              Arrival complaint   Fall/ rib, chest pain no thinners             Chief Complaint   Patient presents with    Fall     Fall 5 days ago out of bed onto a fan onto left side w +HS, -LOC. Pt was up all night vomiting. Dark vomit found on pt's nightgown. Pt c/o L sided rib pain, chest pain, and back pain. Pt is not on blood thinners or asa       Initial Presentation: 89 y.o. female with hx HTN , dementia, OA , chronic diarrhea who presents to ED from home  with worsening back pain with difficuklty ambulating s/p fall  3-4 days ago. + head strike . Not on AC. On exam, pt alert, has tenderness to palpation of the midline T and L-spine. Tenderness to palpation over multiple rib spaces on the right chest wall as well as over the right side of the pelvis and throughout the abdomen . GCS 15. Tachycardic, BP elevated . Labs - elevated WBC 14.60 , elevated Lactic acid  3.3--->1.0  . CT shows T9 compression fx  .XR L spine shows T7 and T8 compression  "fx's. Pt  given IVF,  IV abx in ED. Admitted as Inpatient by trauma service with compression fx T7, T9. S/p fall. Lactic acidosis,. SIRS Plan- Neurosx consult. Neuro checks . Thoracic spine monitoring. Gerontology consult. Multimodal pain control. Sepsis workup .IVF .  AM labs . PT/OT     PT-  AM-PAC Basic Mobility Inpatient Short Form Raw Score is 15. A Raw score of less than or equal to 16 suggests the patient may benefit from discharge to post-acute rehabilitation services. Tolerance to only 3 feet of ambulation     Date: 8/12/24    Day 2:    Pt started on Rocephin x 3 doses for acute UTI . WBC 11.05 today. Afebrile .   Neurosx consult- Mild superior endplate T9 compression TLICS 1 .CT a A/P shows  mild superior T9 compression deformity and severe chronic T7 compression deformity  Patient alert and oriented, communicates well, moves all extremities, strength is 5/5 bilaterally. Sensation to light intact throughout. Tenderness noted at T9 region  . Wear TLSO brace when upright and at 45 degrees . SCDs bilateral legs, okay with pharmacological DVT PPx  . Recommend upright thoracic spine x-rays in brace . PT/OT.   Gerontology consult- Patient complains of difficulty swallowing .  Reports dry cough every time she eats . Hypersalivation noted . SLP eval . Risk for delirium. Consider discontinuing donepezil as it is contraindicated in patients <50 kg; however, patient feels as though it has really helped with her memory. Monitor closely.   AAO x 3 as baseline, currently AAO x 3  . Per granddaughter, patient has had increased dizziness/light-headedness upon standing, leading to falls Check ortho vitals. Back  pain is currently controlled. Continue tylenol ATC, gabapentin 100 mg QHS . TLSO brace .   SLP- bedside swallow eval -  Pt initially c/o sticking and pain w/ swallowing applesauce, but stated \"that went down better\" when eating toast. Pt took sips of water in between bites of food w/o coughing, throat clearing. " creatinine 0 84  electrolytes normal  lipid profile cholesterol 184  TGs 111  HDL 51    FBS 92    she is currently on Amlodipine 2 5 mg daily, Valsartan 320 mg daily and Metoprolol 50 mg BID  she had developed hyponatremia and hypokalemia on HCTZ      Review of Systems   Constitutional: no recent weight changes  Eyes: no blurred vision  ENT: no earache,-- no sore throat-- and-- no hoarseness  Cardiovascular: admission 05/2014 at [facility] with left jaw and arm pain  Normal EKG  she ruled out for MI  Nuclear stress test prior to discharge normal  she has had no recurrence  , but-- no chest pain-- and-- no palpitations  Respiratory: no shortness of breath,-- no wheezing-- and-- no cough  Gastrointestinal: constipation-- and-- constipation not new  colonoscopy 11/2013  sister recently diagnosed with colon CA , but-- no abdominal pain,-- no nausea,-- no vomiting,-- no diarrhea,-- no bright red blood per rectum-- and-- no melena  Genitourinary: no UI  history of recurrent UTIs followed by urology  pelvic u/s 08/2016 stable simple left ovarian cyst  01/2017  normal at 9 5 followed by GYN , but-- no dysuria  Musculoskeletal: pain in other joints-- and-- prior orthopedic evaluation for right knee pain including MRI  meniscal tear  no improvement with steroid injection  she had a 2nd opinion  not a candidate for TKR  right knee x rays OA  no pain at present, but-- no joint swelling-- and-- no joint stiffness  Integumentary and Breasts: no rashes-- and-- no skin lesions  Neurological: headache-- and-- occasional fleeting headaches  no falls, but-- no dizziness  Psychiatric: no anxiety-- and-- no depression  Endocrine: borderline abnormal TSH 3 840 06/2017  repeat TSH 07/2017 2 630  thyroid ABs negative  02/2015 DEXA scan normal   Hematologic and Lymphatic: no swollen glands,-- no tendency for easy bleeding-- and-- no tendency for easy bruising  Active Problems  1   Advance directive Recommend reg diet, thin liquids. Consider VBS/barium swallow for formal imaging of swallowing vs GI consult , hx hiatal hernia.             ED Triage Vitals   Temperature Pulse Respirations Blood Pressure SpO2 Pain Score   08/11/24 0842 08/11/24 0842 08/11/24 0842 08/11/24 0842 08/11/24 0842 08/11/24 1235   97.9 °F (36.6 °C) (!) 125 (!) 24 131/75 96 % 3     Weight (last 2 days)       Date/Time Weight    08/11/24 1235 46.9 (103.4)    08/11/24 0842 46.6 (102.73)            Vital Signs (last 3 days)       Date/Time Temp Pulse Resp BP MAP (mmHg) SpO2 O2 Device Patient Position - Orthostatic VS [place] Coma Scale Score Pain    08/12/24 1143 -- -- -- 150/62 -- -- -- -- -- --    08/12/24 1134 98 °F (36.7 °C) 93 18 185/81 117 -- -- Lying -- --    08/12/24 1100 -- -- -- -- -- -- -- -- 15 No Pain    08/12/24 07:23:50 97.9 °F (36.6 °C) 73 20 174/80 115 90 % -- Lying -- --    08/12/24 02:27:17 97.5 °F (36.4 °C) 98 -- 143/65 -- 90 % -- -- -- --    08/12/24 0227 -- -- 18 -- -- -- -- -- -- --    08/11/24 23:51:04 98.3 °F (36.8 °C) 87 16 -- -- 89 % -- -- -- --    08/11/24 2351 -- -- -- 145/63 -- -- -- -- -- --    08/11/24 1800 -- -- -- -- -- -- None (Room air) -- 15 No Pain    08/11/24 1734 -- -- -- 170/70 -- -- -- Sitting -- --    08/11/24 1556 -- -- -- 168/70 -- -- -- Sitting -- --    08/11/24 1544 -- 87 -- 162/60 -- -- -- Lying -- --    08/11/24 15:29:28 98.3 °F (36.8 °C) 86 14 180/75 -- 93 % -- -- -- --    08/11/24 1502 -- -- -- -- -- -- -- -- -- No Pain    08/11/24 12:53:31 97.9 °F (36.6 °C) -- -- -- -- -- -- -- -- --    08/11/24 1235 -- 91 19 167/76 -- 93 % None (Room air) -- 15 3    08/11/24 1100 -- 91 18 165/78 112 94 % -- -- -- --    08/11/24 1045 -- 99 20 168/72 104 91 % -- -- -- --    08/11/24 1030 -- 101 20 177/72 104 92 % -- -- -- --    08/11/24 1015 -- 106 20 219/89 128 95 % -- -- -- --    08/11/24 1000 -- 107 20 199/84 120 95 % -- -- -- --    08/11/24 0945 -- 106 22 191/94 134 94 % None (Room air) Lying -- --     discussed with patient (V65 49) (Z71 89)   2  Cyst of left ovary (620 2) (N83 202)   3  Diverticulosis (562 10) (K57 90)   4  Hyperlipidemia (272 4) (E78 5)   5  Hypertension (401 9) (I10)   6  Need for prophylactic vaccination and inoculation against influenza (V04 81) (Z23)   7  Osteoarthritis of knee (715 36) (M17 10)   8  Osteopenia (733 90) (M85 80)   9  Post-menopausal bleeding (627 1) (N95 0)   10  Subclinical hypothyroidism (244 8) (E03 9)    Past Medical History  1  History of Accelerated essential hypertension (401 0) (I10)   2  History of Benign colon polyp (211 3) (K63 5)   3  History of Depression with anxiety (300 4) (F41 8)   4  History of actinic keratosis (V13 3) (Z87 2)   5  History of Urinary Tract Infection (V13 02)    Surgical History  1  History of Blepharoplasty Upper Lid W/ Excessive Skin   2  History of Cardiovascular Stress Test   3  History of Dilation And Curettage   4  History of Endometrial Biopsy By Suction   5  History of Tubal Ligation    Family History  Father    1  Family history of cardiac disorder (V17 49) (Z82 49)   2  Family history of skin cancer (V16 8) (Z80 8)  Sister    3  Family history of Colon cancer  Family History    4  Family history of Coronary Artery Disease (V17 49)   5  Family history of Heart Disease (V17 49)   6  Family history of Hypertension (V17 49)    Social History     · Denied: History of Alcohol   · Denied: History of Home Environment Domestic Violence   · Never A Smoker    Current Meds   1  AmLODIPine Besylate 2 5 MG Oral Tablet; take 1 tablet by mouth every day; Therapy: 35UXO1369 to (Evaluate:64Krp4297)  Requested for: 31PRD2158; Last Rx:05Jan2017 Ordered   2  Calcium TABS; Therapy: (Recorded:19Mar2014) to Recorded   3  [employer] Vit D 5000 High-Potency CAPS; Therapy: (Recorded:19Mar2014) to Recorded   4  Methenamine Hippurate 1 GM Oral Tablet; take 1/2 tablet daily Recorded   5  Metoprolol Tartrate 50 MG Oral Tablet; TAKE 1 TABLET TWICE DAILY;  Therapy: 08/11/24 0930 -- 111 -- 191/89 128 -- -- -- -- --    08/11/24 0915 -- -- -- 190/87 125 -- -- -- -- --    08/11/24 0855 -- -- -- -- -- -- None (Room air) -- -- --    08/11/24 0844 -- -- -- -- -- -- -- -- 15 --    08/11/24 0842 97.9 °F (36.6 °C) 125 24 131/75 -- 96 % None (Room air) -- -- --              Pertinent Labs/Diagnostic Test Results:   Radiology:  XR spine thoracic 3 vw   Final Interpretation by Garo Ordoñez MD (08/12 0820)      Unchanged T7 and T9 compression fractures. Stable alignment.            Workstation performed: [id number]         XR tibia fibula 2 views RIGHT   ED Interpretation by Cortes Jiménez MD (08/11 1001)   Per my independent interpretation: No acute osseous abnormality      Final Interpretation by Reshma Camacho MD (08/11 1102)      No acute osseous abnormality.            Computerized Assisted Algorithm (CAA) may have been used to analyze all applicable images.               Workstation performed: [id number]         CT head without contrast   Final Interpretation by E. Alec Schoenberger, MD (08/11 0956)      No acute intracranial abnormality. Stable chronic microangiopathy.                  Workstation performed: [id number]         CT spine cervical without contrast   Final Interpretation by E. Alec Schoenberger, MD (08/11 0956)      No cervical spine fracture or traumatic malalignment.                  Workstation performed: [id number]         CT chest abdomen pelvis wo contrast   Final Interpretation by E. Alec Schoenberger, MD (08/11 1028)   Transitional, sacralized L5 vertebra   Moderate T9 superior plate compression fracture that could be acute or possibly subacute. Minimal bone anteropulsion without significant canal stenosis   Age-indeterminate mild superior plate compression fracture at T9.   Infiltrative changes surrounding the head of the pancreas concerning for pancreatitis. Correlate with lipase.   No other acute intrathoracic or intra-abdominal pathology.        22LXE6961 to (Evaluate:78Wfy0212)  Requested for: 06WAS4140; Last Rx:05Jan2017 Ordered   6  Valsartan 320 MG Oral Tablet; take one tablet by mouth every day; Therapy: 51HUL7186 to (Evaluate:90Etq6725)  Requested for: 77RDS0569; Last Rx:05Jan2017 Ordered    Allergies  1  Penicillins    Vitals  Vital Signs    Recorded: 12Dec2017 08:08AM   Temperature 97 6 F   Heart Rate 70   Respiration 16   Systolic 335   Diastolic 84   Height 5 ft 7 in   Weight 165 lb 8 oz   BMI Calculated 25 92   BSA Calculated 1 87       Physical Exam   Constitutional  General appearance: No acute distress, well appearing and well nourished  Eyes  Conjunctiva and lids: No swelling, erythema or discharge  Pupils and irises: Equal, round, reactive to light  Ophthalmoscopic examination: Normal fundi and optic discs  Ears, Nose, Mouth, and Throat  Otoscopic examination: Tympanic membranes translucent with normal light reflex  Canals patent without erythema  Oropharynx: Normal with no erythema, edema, exudate or lesions  Neck  Neck: Supple, symmetric, trachea midline, no masses  Thyroid: Normal, no thyromegaly  There were no thyroid nodules  Pulmonary  Respiratory effort: No increased work of breathing or signs of respiratory distress  Auscultation of lungs: Clear to auscultation  Cardiovascular  Auscultation of heart: Normal rate and rhythm, normal S1 and S2, no murmurs  Heart sounds: no gallop heard  Carotid pulses: 2+ bilaterally  Abdominal aorta: Normal   Abdominal aorta: no bruit heard  Examination of extremities for edema and/or varicosities: Normal    Abdomen  Abdomen: Non-tender, no masses  Liver and spleen: No hepatomegaly or splenomegaly  Lymphatic  Palpation of lymph nodes in neck: No lymphadenopathy  no anterior cervical node enlargement,-- no posterior cervical node enlargement,-- no submandibular node enlargement-- and-- no supraclavicular node enlargement    Musculoskeletal  Gait and station: Normal    Range   I personally discussed this study with ALENA SANCHEZ on 8/11/2024 10:26 AM.            Workstation performed: [id number]           Cardiology:  8/11/24    ECG- Sinus rhythm with Premature ventricular complexes  Nonspecific ST and T wave abnormality        Results from last 7 days   Lab Units 08/12/24  0632 08/11/24  0907   WBC Thousand/uL 11.05* 14.60*   HEMOGLOBIN g/dL 11.2* 13.6   HEMATOCRIT % 34.9 42.5   PLATELETS Thousands/uL 251 299   TOTAL NEUT ABS Thousands/µL 8.82* 12.84*         Results from last 7 days   Lab Units 08/12/24  0632 08/11/24  0907   SODIUM mmol/L 140 140   POTASSIUM mmol/L 3.9 3.7   CHLORIDE mmol/L 108 105   CO2 mmol/L 25 23   ANION GAP mmol/L 7 12   BUN mg/dL 26* 29*   CREATININE mg/dL 0.96 1.15   EGFR ml/min/1.73sq m 48 39   CALCIUM mg/dL 8.4 9.4   MAGNESIUM mg/dL 1.9  --    PHOSPHORUS mg/dL 2.9  --      Results from last 7 days   Lab Units 08/11/24  0907   AST U/L 19   ALT U/L 19   ALK PHOS U/L 62   TOTAL PROTEIN g/dL 7.8   ALBUMIN g/dL 4.2   TOTAL BILIRUBIN mg/dL 0.87         Results from last 7 days   Lab Units 08/12/24  0632 08/11/24  0907   GLUCOSE RANDOM mg/dL 117 152*           Results from last 7 days   Lab Units 08/11/24  0907   CK TOTAL U/L 50     Results from last 7 days   Lab Units 08/11/24  1238 08/11/24  1121 08/11/24  0907   HS TNI 0HR ng/L  --   --  33   HS TNI 2HR ng/L  --  30  --    HSTNI D2 ng/L  --  -3  --    HS TNI 4HR ng/L 32  --   --    HSTNI D4 ng/L -1  --   --          Results from last 7 days   Lab Units 08/11/24  0907   PROTIME seconds 13.8   INR  0.99   PTT seconds 27         Results from last 7 days   Lab Units 08/11/24  0907   PROCALCITONIN ng/ml 0.14     Results from last 7 days   Lab Units 08/11/24  1121 08/11/24  0907   LACTIC ACID mmol/L 1.0 3.3*               Results from last 7 days   Lab Units 08/11/24  0907   LIPASE u/L 10*                 Results from last 7 days   Lab Units 08/11/24  1726   CLARITY UA  Clear   COLOR UA  Light Yellow   SPEC  of motion: Normal    Skin  Skin and subcutaneous tissue: Normal without rashes or lesions  Neurologic  Cranial nerves: Cranial nerves II-XII intact  Psychiatric  Recent and remote memory: Intact  Mood and affect: Normal        Results/Data  Falls Risk Assessment (Dx Z13 89 Screen for Neurologic Disorder) 35SLV2245 08:12AM User, Todds     Test Name Result Flag Reference   Falls Risk      No falls in the past year     (1) TSH WITH FT4 REFLEX 84Rcr3626 10:41AM Avani Sinclair Order Number: [id number]     Test Name Result Flag Reference   TSH 2 630 uIU/mL  0 358-3 740     Patients undergoing fluorescein dye angiography may retain small amounts of fluorescein in the body for 48-72 hours post procedure  Samples containing fluorescein can produce falsely depressed TSH values  If the patient had this procedure,a specimen should be resubmitted post fluorescein clearance  The recommended reference ranges for TSH during pregnancy are as follows: First trimester 0 1 to 2 5 uIU/mL Second trimester  0 2 to 3 0 uIU/mL Third trimester 0 3 to 3 0 uIU/m     (1) THYROID ANTIBODIES PANEL 28Jul2017 10:41AM Avani Sinclair Order Number: [id number]     Test Name Result Flag Reference   THYROGLOB AB <1 0 IU/mL  0 0 - 0 9     Thyroglobulin Antibody measured by [facility] Methodology  Performed at:  [address] [place] [address]  [id number] : Jamal New MD, Phone:  [phone]   [facility] MICROSOM AB 10 IU/mL  0 - 34     Performed at:  [address] [place] [address]  [id number] : Jamal New MD, Phone:  [phone]     (1) CBC/PLT/DIFF 40FNG7363 07:52AM Avani Sinclair Order Number: [id number]     Test Name Result Flag Reference   WBC COUNT 4 87 Thousand/uL  4 31-10 16   RBC COUNT 4 24 Million/uL  3 81-5 12   HEMOGLOBIN 12 9 g/dL  11 5-15 4   HEMATOCRIT 39 0 %  34 8-46  1   MCV 92 fL  82-98   MCH 30 4 pg  26 8-34 3   MCHC 33 1 GRAV UA  1.022   PH UA  5.5   GLUCOSE UA mg/dl Negative   KETONES UA mg/dl Negative   BLOOD UA  Small*   PROTEIN UA mg/dl 70 (1+)*   NITRITE UA  Negative   BILIRUBIN UA  Negative   UROBILINOGEN UA (BE) mg/dl <2.0   LEUKOCYTES UA  Large*   WBC UA /hpf Innumerable*   RBC UA /hpf 2-4*   BACTERIA UA /hpf Innumerable*   EPITHELIAL CELLS WET PREP /hpf Occasional               Results from last 7 days   Lab Units 08/11/24  0907   BLOOD CULTURE  No Growth at 24 hrs.  No Growth at 24 hrs.                   ED Treatment-Medication Administration from 08/11/2024 0828 to 08/11/2024 1250         Date/Time Order Dose Route Action     08/11/2024 0946 sodium chloride 0.9 % bolus 500 mL 500 mL Intravenous New Bag     08/11/2024 1006 ceftriaxone (ROCEPHIN) 1 g/50 mL in dextrose IVPB 1,000 mg Intravenous New Bag     08/11/2024 0901 ondansetron (ZOFRAN) injection 4 mg 4 mg Intravenous Given     08/11/2024 1227 multi-electrolyte (PLASMALYTE-A/ISOLYTE-S PH 7.4) IV solution 50 mL/hr Intravenous New Bag     08/11/2024 1227 lidocaine (LIDODERM) 5 % patch 1 patch 1 patch Topical Medication Applied            History reviewed. No pertinent past medical history.  Present on Admission:  **None**      Admitting Diagnosis: Chest pain [R07.9]  Elevated blood pressure reading [R03.0]  Dyspnea [R06.00]  SIRS (systemic inflammatory response syndrome) ([facility]) [R65.10]  Thoracic compression fracture ([facility]) [S22.000A]  Pancreatic lesion [K86.9]  Fall, initial encounter [W19.XXXA]  Unspecified multiple injuries, initial encounter [T07.XXXA]  Age/Sex: 89 y.o. female  Admission Orders:  Scheduled Medications:  acetaminophen, 975 mg, Oral, Q8H ROBERTO  amLODIPine, 2.5 mg, Oral, Daily  cefTRIAXone, 1,000 mg, Intravenous, Q24H  donepezil, 10 mg, Oral, HS  gabapentin, 100 mg, Oral, HS  heparin (porcine), 5,000 Units, Subcutaneous, Q8H ROBERTO  lidocaine, 1 patch, Topical, Daily  mirtazapine, 15 mg, Oral, HS  nystatin, , Topical, BID  pantoprazole, 40 mg, Oral, Early  g/dL  31 4-37 4   RDW 12 1 %  11 6-15 1   MPV 10 4 fL  8 9-12 7   PLATELET COUNT 796 Thousands/uL  149-390   nRBC AUTOMATED 0 /100 WBCs     NEUTROPHILS RELATIVE PERCENT 57 %  43-75   LYMPHOCYTES RELATIVE PERCENT 29 %  14-44   MONOCYTES RELATIVE PERCENT 10 %  4-12   EOSINOPHILS RELATIVE PERCENT 3 %  0-6   BASOPHILS RELATIVE PERCENT 1 %  0-1   NEUTROPHILS ABSOLUTE COUNT 2 74 Thousands/? ??L  1 85-7 62   LYMPHOCYTES ABSOLUTE COUNT 1 43 Thousands/? ??L  0 60-4 47   MONOCYTES ABSOLUTE COUNT 0 50 Thousand/? ??L  0 17-1 22   EOSINOPHILS ABSOLUTE COUNT 0 16 Thousand/? ??L  0 00-0 61   BASOPHILS ABSOLUTE COUNT 0 03 Thousands/? ??L  0 00-0 10     (1) COMPREHENSIVE METABOLIC PANEL [id number] 33:33ZN Petra Rough Order Number: [id number]     Test Name Result Flag Reference   SODIUM 138 mmol/L  136-145   POTASSIUM 3 7 mmol/L  3 5-5 3   CHLORIDE 104 mmol/L  100-108   CARBON DIOXIDE 28 mmol/L  21-32   ANION GAP (CALC) 6 mmol/L  4-13   BLOOD UREA NITROGEN 17 mg/dL  5-25   CREATININE 0 84 mg/dL  0 60-1 30   Standardized to IDMS reference method   CALCIUM 8 6 mg/dL  8 3-10 1   BILI, TOTAL 0 62 mg/dL  0 20-1 00   ALK PHOSPHATAS 53 U/L     ALT (SGPT) 21 U/L  12-78   AST(SGOT) 16 U/L  5-45   ALBUMIN 3 4 g/dL L 3 5-5 0   TOTAL PROTEIN 6 2 g/dL L 6 4-8 2   eGFR Non-African American      >60 0 ml/min/1 73sq [facility] Disease Education Program recommendations are as follows: GFR calculation is accurate only with a steady state creatinine Chronic Kidney disease less than 60 ml/min/1 73 sq  meters Kidney failure less than 15 ml/min/1 73 sq  meters  GLUCOSE FASTING 92 mg/dL  65-99     (1) LIPID PANEL, FASTING 15Jun2017 07:52AM Petra Rough Order Number: [id number]     Test Name Result Flag Reference   CHOLESTEROL 184 mg/dL     HDL,DIRECT 51 mg/dL  40-60   Specimen collection should occur prior to Metamizole administration due to the potential for falsely depressed results     LDL CHOLESTEROL CALCULATED 111 mg/dL H 0-100     Triglyceride:       Normal              <150 mg/dl      Borderline High    150-199 mg/dl      High               200-499 mg/dl      Very High          >499 mg/dl Cholesterol:       Desirable        <200 mg/dl     Borderline High  200-239 mg/dl     High             >239 mg/dl HDL Cholesterol:      High    >59 mg/dL     Low     <41 mg/dL LDL CALCULATED:  This screening LDL is a calculated result  It does not have the accuracy of the Direct Measured LDL in the monitoring of patients with hyperlipidemia and/or statin therapy  Direct Measure LDL (SLA464) must be ordered separately in these patients  TRIGLYCERIDES 111 mg/dL  <=150   Specimen collection should occur prior to N-Acetylcysteine or Metamizole administration due to the potential for falsely depressed results  (1) TSH [id number] 07:52AM [employer] Order Number: [id number]     Test Name Result Flag Reference   TSH 3 840 uIU/mL H 0 358-3 740     Patients undergoing fluorescein dye angiography may retain small amounts of fluorescein in the body for 48-72 hours post procedure  Samples containing fluorescein can produce falsely depressed TSH values  If the patient had this procedure,a specimen should be resubmitted post fluorescein clearance       The recommended reference ranges for TSH during pregnancy are as follows: First trimester 0 1 to 2 5 uIU/mL Second trimester  0 2 to 3 0 uIU/mL Third trimester 0 3 to 3 0 uIU/m     (1) URINALYSIS (will reflex a microscopy if leukocytes, occult blood, protein or nitrites are not within normal limits) [id number] 07:52AM [employer] Order Number: [id number]     Test Name Result Flag Reference   COLOR Yellow     CLARITY Clear     SPECIFIC GRAVITY UA 1 011  1 003-1 030   PH UA 7 0  4 5-8 0   LEUKOCYTE ESTERASE UA Negative  Negative   NITRITE UA Negative  Negative   PROTEIN UA Negative mg/dl  Negative   GLUCOSE UA Negative mg/dl  Negative   KETONES UA Morning  polyethylene glycol, 17 g, Oral, Daily  senna-docusate sodium, 1 tablet, Oral, HS  sertraline, 50 mg, Oral, Daily      Continuous IV Infusions:   multi-electrolyte (PLASMALYTE-A/ISOLYTE-S PH 7.4) IV solution  Rate: 50 mL/hr Dose: 50 mL/hr  Freq: Continuous Route: IV  Indications of Use: IV Hydration  Last Dose: Stopped (08/12/24 0856)  Start: 08/11/24 1130 End: 08/12/24 0804  PRN Meds:  HYDROmorphone, 0.2 mg, Intravenous, Q2H PRN  naloxone, 0.04 mg, Intravenous, Q1MIN PRN  ondansetron, 4 mg, Intravenous, Q6H PRN x1 8/11   oxyCODONE, 2.5 mg, Oral, Q4H PRN   Or  oxyCODONE, 5 mg, Oral, Q4H PRN    Reg diet    Neuro checks    SCD   OOB to chair TID   TLSO when upright and at 45 degree head of bed    Ortho BP  daily starting 8/13     IP CONSULT TO CASE MANAGEMENT  IP CONSULT TO NEUROSURGERY  IP CONSULT TO GERONTOLOGY    Network Utilization Review Department  ATTENTION: Please call with any questions or concerns to [phone] and carefully listen to the prompts so that you are directed to the right person. All voicemails are confidential.   For Discharge needs, contact Care Management DC Support Team at [phone] opt. [phone]  Send all requests for admission clinical reviews, approved or denied determinations and any other requests to dedicated fax number below belonging to the campus where the patient is receiving treatment. List of dedicated fax numbers for the Facilities:  FACILITY NAME UR FAX NUMBER   ADMISSION DENIALS (Administrative/Medical Necessity) [phone]   DISCHARGE SUPPORT TEAM (NETWORK) [phone]   PARENT CHILD HEALTH (Maternity/NICU/Pediatrics) [phone]   [facility] [phone]   [facility] [phone]   [facility] [phone]   [facility] [phone]   [facility] [phone]   [facility] [phone]   [facility] [phone]   [facility] [phone]   [facility] [phone]   On license of [facility] [phone]   [facility] [phone]   [facility] [phone]          Negative mg/dl  Negative   UROBILINOGEN UA 0 2 E U /dl  0 2, 1 0 E U /dl   BILIRUBIN UA Negative  Negative   BLOOD UA Negative  Negative     * MAMMO SCREENING BILATERAL W CAD [id number] 08:02AM [employer] Order Number: [id number]   - Patient Instructions: To schedule this appointment, please contact Central Scheduling at [phone]  Do not wear any perfume, powder, lotion or deodorant on breast or underarm area  Please bring your doctors order, referral (if needed) and insurance information with you on the day of the test  Failure to bring this information may result in this test being rescheduled  Arrive 15 minutes prior to your appointment time to register  On the day of your test, please bring any prior mammogram or breast studies with you that were not performed at a [facility]  Failure to bring prior exams may result in your test needing to be rescheduled  Test Name Result Flag Reference   MAMMO SCREENING BILATERAL W CAD (Report)       Patient History:  Patient is postmenopausal   Family history of colorectal cancer in sister at age 80  Took estrogen for 10 years  Patient has never smoked  Patient's BMI is 25 1  Reason for exam: screening (asymptomatic)  Mammo Screening Bilateral W CAD: January 18, 2017 - Check In #:   [de-identified]  Bilateral MLO and CC view(s) were taken  Technologist: RT Fadia(R)(M)  Prior study comparison: February 5, 2015, digital bilateral   screening mammogram performed at [address] [place] [address]  January 14, 2014, digital bilateral screening mammogram performed  at [address] [place] [address]  November 30, 2010, digital   bilateral screening mammogram performed at /[facility]  There are scattered fibroglandular densities  No dominant soft tissue mass, architectural distortion or   suspicious calcifications are noted in either breast  The skin   and nipple contours are within normal limits      No evidence of malignancy  No significant changes when compared with prior studies  ASSESSMENT: BiRad:1 - Negative   Recommendation:  Routine screening mammogram of both breasts in 1 year  A   reminder letter will be scheduled  Analyzed by CAD   8-10% of cancers will be missed on mammography  Management of a   palpable abnormality must be based on clinical grounds  Patients  will be notified of their results via letter from our facility  Accredited by [employer] of Radiology and FDA  Transcription Location: [facility] 98: [id number]   Risk Value(s):  Tyrer-Cuzick 10 Year: 2 149%, Tyrer-Cuzick Lifetime: 2 149%,   Myriad Table: 1 5%, TREVON 5 Year: 2 2%, NCI Lifetime: 4 2%  Signed by:  Deshawn Banuelos MD  1/18/17     (1)  [id number] 11:58AM Olga Fine Order Number: [id number]     Test Name Result Flag Reference   (NEW) 9 5 U/mL  0 0-30 0   Performed at [facility]  Results cannot be interpreted as absolute evidence for the presence or the absence of malignant disease  Duplicate testing  This test should be used to re-establish your patient's baseline  (1) THIN PREP PAP WITH IMAGING [id number] 12:00AM Kin[facility]     Test Name Result Flag Reference   LAB AP CASE REPORT (Report)       Gynecologic Cytology Report            Case: [id number]                 Authorizing Provider: Meliton Blunt MD     Collected:      12/28/2016          First Screen:     JANAE Roblero    Received:      12/29/2016 1025       Specimen:  LIQUID-BASED PAP, SCREENING, Cervix   LAB AP GYN PRIMARY INTERPRETATION        Negative for intraepithelial lesion or malignancy Electronically signed by JANAE Roblero on 1/4/2017 at 12:26 PM   LAB AP GYN SPECIMEN ADEQUACY      Satisfactory for evaluation  (See note)   LAB AP GYN NOTE        No endocervical cells identified   It is difficult to differentiate between  squamous metaplastic cells and parabasal cells in atrophic specimens due  to numerous causes including menopause, postpartum changes and  progestational agents  LAB AP GYN ADDITIONAL INFORMATION (Report)       [employer] FDA approved ,  and ThinPrep Imaging System are  utilized with strict adherence to the 's instruction manual to  prepare gynecologic and non-gynecologic cytology specimens for the  production of ThinPrep slides as well as for gynecologic ThinPrep imaging  These processes have been validated by our laboratory and/or by the    The Pap test is not a diagnostic procedure and should not be used as the  sole means to detect cervical cancer  It is only a screening procedure to  aid in the detection of cervical cancer and its precursors  Both  false-negative and false-positive results have been experienced  Your  patient's test result should be interpreted in this context together with  the history and clinical findings  LAB AP LMP        * US PELVIS COMPLETE [facility] AND TRANSVAGINAL) [id number] 07:46AM [facility] Order Number: [id number]   - Patient Instructions: To schedule this appointment, please contact Central Scheduling at [phone]  Test Name Result Flag Reference   US PELVIS COMPLETE (TRANSABDOMINAL AND TRANSVAGINAL) (Report)       PELVIC ULTRASOUND, COMPLETE   INDICATION: Follow-up left ovarian cyst   Patient is presumably postmenopausal        COMPARISON: November 11, 2015   TECHNIQUE:  Transabdominal pelvic ultrasound was performed in sagittal and transverse planes with a curvilinear transducer  Additional transvaginal imaging was performed to better evaluate the endometrium and ovaries  Imaging included volumetric   sweeps as well as traditional still imaging technique  FINDINGS:   UTERUS:  The uterus is anteverted in position, measuring 7 5 x 3 9 x 4 9 cm  Volume = 74 mL, previously 64 mL  Myometrial echotexture is diffusely heterogeneous   No discrete mass  The cervix shows no suspicious abnormality  ENDOMETRIUM:   Normal caliber of 4 mm  Homogenous and normal in appearance  OVARIES/ADNEXA:  Right ovary: Not visualized   Left ovary: 4 0 x 4 2 x 4 3 cm  Simple cyst measuring 3 9 cm, previously 3 8 cm  No suspicious left ovarian abnormality  Doppler flow within normal limits  No suspicious adnexal mass or loculated collections  There is no free fluid  IMPRESSION:  Unremarkable uterus  Normal thickness endometrium  Right ovary not visualized  Relatively stable simple cyst left ovary measuring up to 3 9 cm  Yearly follow-up recommended  ##[id number]##[id number]      Workstation performed: [id number]   Signed by: Brea Howell DO  8/9/16     Future Appointments    Date/Time Provider Specialty Site   06/12/2018 08:00 AM JULIA Hernandez   Family Medicine [address] [facility]       Signatures   Electronically signed by : JULIA Clark ; Dec 12 2017 10:20AM EST                       (Author)

## 2024-08-22 ENCOUNTER — OFFICE VISIT (OUTPATIENT)
Dept: FAMILY MEDICINE CLINIC | Facility: CLINIC | Age: 84
End: 2024-08-22
Payer: MEDICARE

## 2024-08-22 VITALS
HEIGHT: 67 IN | RESPIRATION RATE: 16 BRPM | SYSTOLIC BLOOD PRESSURE: 148 MMHG | OXYGEN SATURATION: 96 % | BODY MASS INDEX: 26.53 KG/M2 | DIASTOLIC BLOOD PRESSURE: 78 MMHG | HEART RATE: 66 BPM | WEIGHT: 169 LBS | TEMPERATURE: 97.5 F

## 2024-08-22 DIAGNOSIS — M54.16 LUMBAR RADICULOPATHY: ICD-10-CM

## 2024-08-22 DIAGNOSIS — R53.82 CHRONIC FATIGUE: ICD-10-CM

## 2024-08-22 DIAGNOSIS — I10 PRIMARY HYPERTENSION: Primary | ICD-10-CM

## 2024-08-22 DIAGNOSIS — R73.03 PREDIABETES: ICD-10-CM

## 2024-08-22 DIAGNOSIS — N32.81 OAB (OVERACTIVE BLADDER): ICD-10-CM

## 2024-08-22 PROCEDURE — 99214 OFFICE O/P EST MOD 30 MIN: CPT | Performed by: FAMILY MEDICINE

## 2024-08-22 PROCEDURE — G2211 COMPLEX E/M VISIT ADD ON: HCPCS | Performed by: FAMILY MEDICINE

## 2024-08-22 RX ORDER — METOPROLOL TARTRATE 25 MG/1
25 TABLET, FILM COATED ORAL 2 TIMES DAILY
Qty: 180 TABLET | Refills: 3 | Status: SHIPPED | OUTPATIENT
Start: 2024-08-22 | End: 2025-08-17

## 2024-08-22 RX ORDER — IRBESARTAN 300 MG/1
300 TABLET ORAL
Qty: 90 TABLET | Refills: 3 | Status: SHIPPED | OUTPATIENT
Start: 2024-08-22

## 2024-08-22 NOTE — PROGRESS NOTES
Ambulatory Visit  Name: Rosa Maria Grant      : 1940      MRN: [id number]  Encounter Provider: Valentino Roth MD  Encounter Date: 2024   Encounter department: [facility]    Assessment & Plan   1. Primary hypertension  -     irbesartan (AVAPRO) 300 mg tablet; Take 1 tablet (300 mg total) by mouth daily at bedtime  -     metoprolol tartrate (LOPRESSOR) 25 mg tablet; Take 1 tablet (25 mg total) by mouth 2 (two) times a day  2. Lumbar radiculopathy  -     MRI lumbar spine wo contrast; Future; Expected date: 2024  3. OAB (overactive bladder)-nocturia  4. Prediabetes  5. Chronic fatigue    Continue with current medications. Monitor BP    MRI lumbar spine    I discussed medication for OAB/Urology evaluation    OV 10/2024        History of Present Illness       Follow up OV for chronic medical problems. Medications reviewed     Hypertension-blood pressures have stable on Amlodipine 5 mg AM and 2.5 mg PM, Irbesartan 300 mg daily and Metoprolol 25 mg BID. Past history of hyponatremia and hypokalemia while on HCTZ.   Labs 2024  creatinine 0.85. GFR 62.  Electrolytes normal. Na+ 138. K+ 4.0.  Hgb 13.4.     Prediabetes FBS 97. A1c 5.9. + FH type DM.     2020 Lipid profile cholesterol 187. TGs 88. HDL 55. . LFTs normal.       2024 OV for recurrent pruritic dermatitis.  Patient was initially seen in urgent care with facial swelling/rash. She was treated for facial cellulitis with Clindamycin.  She developed pruritic rash on left arm and was seen in the emergency room at [facility]. She was prescribed prednisone  CBC at that time was normal.  She was seen here and was continued on prednisone along with OTC antihistamines.  She went back to the emergency room at [facility] and repeat CBC showed a mild leukocytosis likely secondary to oral steroids.  Urinalysis showed leukocytes urine culture mixed contaminant.  She took several doses of Cipro she is currently asymptomatic.  She  has a new rash pruritic right lower abdomen area  No history of seasonal allergies. Recent cat exposure. [place] allergy panel and food panel normal. Symptoms have resolved           Lab Results   Component Value Date    SODIUM 135 06/29/2024    K 4.3 06/29/2024     06/29/2024    CO2 28 06/29/2024    BUN 19 06/29/2024    CREATININE 0.75 06/29/2024    GLUC 114 06/29/2024    CALCIUM 9.1 06/29/2024     Lab Results   Component Value Date    WBC 13.21 (H) 06/29/2024    HGB 13.8 06/29/2024    HCT 41.5 06/29/2024    MCV 93 06/29/2024     06/29/2024     Lab Results   Component Value Date    KBT6KNPOCSLG 3.707 05/03/2024     Lab Results   Component Value Date    HGBA1C 5.9 (H) 05/03/2024       Review of Systems   Constitutional:  Positive for fatigue (chronic). Negative for appetite change, chills, fever and unexpected weight change.        Frequent awakening at HS due to nocturia    HENT:  Negative for congestion, ear pain, hearing loss, rhinorrhea, sore throat and trouble swallowing.         Chronic nasal congestion improved with Flonase.    Eyes:  Negative for visual disturbance.        Cataract surgery OU    Respiratory:  Negative for cough, shortness of breath and wheezing.    Cardiovascular:  Negative for chest pain, palpitations and leg swelling.        01/2020 admission for substernal chest pain. EKG NSR with frequent PVCs. she was sent to [facility]. Initial EKG NSR with fusion complexes. No acute changes. 2nd EKG sinus bradycardia with PACs. troponins x 3 negative.  Chest x-ray no active disease.  Stress echocardiogram normal.  EF 60%.   Gastrointestinal:  Negative for abdominal pain, blood in stool, constipation, diarrhea, nausea and vomiting.         Chronic constipation. Colonoscopy 03/2019 normal except for diverticulosis. + FH colon CA sister.     Endocrine: Negative for polydipsia and polyuria.        Phx subclinical hypothyroidism  TSH 07/2017  thyroid ABs negative.   02/2015 DEXA scan  normal.    Lab Results       Component                Value               Date                       [id number]             3.707               05/03/2024                Lab Results       Component                Value               Date                       [id number]             4.190               02/27/2023        Lab Results       Component                Value               Date                       [id number]             4.590 (H)           08/04/2022                                      Genitourinary:  Negative for difficulty urinating, dysuria and hematuria.        History of recurrent UTIs followed by Urology. On Hiprex.  02/2019 kidney bladder ultrasound normal minimal PVR. 29 ML.  Simple cyst left adnexa without significant change.  pelvic u/s 08/2016 stable simple left ovarian cyst. 01/2017  normal at 9.5 followed by GYN.,    Musculoskeletal:  Positive for back pain. Negative for arthralgias and myalgias.        Recurrent pain R lower back radiating into R gluteal area. No R leg weakness or numbness.  X rays R hip normal. She completed PT.  Ortho and rehab medicine evaluations    Skin:  Negative for rash.   Allergic/Immunologic:        See HPI    Neurological:  Negative for dizziness and headaches.   Hematological:  Negative for adenopathy. Does not bruise/bleed easily.        Past history of mild leukopenia  06/2018  Vitamin B12 589.  Folate greater than 20.     Lab Results       Component                Value               Date                       WBC                      4.80                09/26/2023                 HGB                      13.1                09/26/2023                 HCT                      40.5                09/26/2023                 MCV                      93                  09/26/2023                 PLT                      280                 09/26/2023                                                 WBC       Date                     Value                Ref Range           Status                06/29/2021               5.45                4.31 - 10.16 T*     Final                 12/14/2020               4.47                4.31 - 10.16 T*     Final                 05/27/2020               5.07                4.31 - 10.16 T*     Final                 06/11/2015               4.75                4.31 - 10.16 T*     Final                 04/02/2014               5.24                4.31 - 10.16 T*     Final                          Psychiatric/Behavioral:  Negative for dysphoric mood and sleep disturbance. The patient is not nervous/anxious.      Past Medical History:   Diagnosis Date    Accelerated essential hypertension     last assessed 08/17/2015    Allergic Pencillin    Arthritis     Depression with anxiety     last assesed 08/16/2012    Hypertension     Neutropenia (HCC) 01/08/2020    Post-menopausal bleeding 12/05/2013    Strep pharyngitis 08/18/2019     Past Surgical History:   Procedure Laterality Date    BLEPHAROPLASTY      upper lid w/excessive skin    CARDIOVASCULAR STRESS TEST      onset June 2005    COLONOSCOPY      DILATION AND CURETTAGE, DIAGNOSTIC / THERAPEUTIC      ENDOMETRIAL BIOPSY      negative for dysplasia, hyperplasia and malignancy, resolved 03/25/2013    TUBAL LIGATION       Family History   Problem Relation Age of Onset    Heart disease Father         cardiac disorder    Skin cancer Father     Colon cancer Sister 81    Heart disease Family     Hypertension Family     Coronary artery disease Family     No Known Problems Daughter     No Known Problems Sister     No Known Problems Sister     No Known Problems Daughter     No Known Problems Daughter     No Known Problems Maternal Aunt     No Known Problems Maternal Aunt     No Known Problems Maternal Aunt     No Known Problems Paternal Aunt     No Known Problems Paternal Aunt     No Known Problems Paternal Aunt     No Known Problems Mother     No Known Problems Maternal Grandmother     No Known  "Problems Maternal Grandfather     No Known Problems Paternal Grandmother     No Known Problems Paternal Grandfather      Social History     Tobacco Use    Smoking status: Never    Smokeless tobacco: Never   Vaping Use    Vaping status: Never Used   Substance and Sexual Activity    Alcohol use: Yes     Comment: 2 or 3 cocktails a year    Drug use: No    Sexual activity: Not on file     Current Outpatient Medications on File Prior to Visit   Medication Sig    amLODIPine (NORVASC) 2.5 mg tablet 2 tablets in AM. 1 tablet in PM.    Calcium Carb-Cholecalciferol 1000-800 MG-UNIT TABS Take by mouth    Cranberry 97016 MG CAPS Take 1 capsule by mouth in the morning    ketoconazole (NIZORAL) 2 % shampoo Apply 1 application topically 2 (two) times a week    methenamine hippurate (HIPREX) 1 g tablet Take 0.5 g by mouth 2 (two) times a day    multivitamin (THERAGRAN) TABS Take 1 tablet by mouth     Allergies   Allergen Reactions    Penicillins Rash     Immunization History   Administered Date(s) Administered    COVID-19 MODERNA VACC 0.5 ML IM 02/03/2021, 03/03/2021, 11/02/2021, 07/15/2022    COVID-19 Moderna Vac BIVALENT 12 Yr+ IM 0.5 ML 12/02/2022    INFLUENZA 09/28/2018, 09/28/2019, 10/05/2020, 12/08/2021    Influenza Split High Dose Preservative Free IM 09/18/2013, 09/23/2014, 11/17/2015, 11/30/2016, 12/12/2017, 10/05/2020    Influenza, high dose seasonal 0.7 mL 11/05/2018, 09/28/2019, 10/04/2021, 11/16/2021, 11/04/2022, 10/13/2023    Influenza, seasonal, injectable 1940, 09/10/2012    Pneumococcal Conjugate 13-Valent 11/17/2015    Pneumococcal Polysaccharide PPV23 11/02/2006    Td (adult), adsorbed 12/29/2010    Zoster Vaccine Recombinant 11/06/2020, 01/07/2021     Objective       /78 (BP Location: Left arm, Patient Position: Sitting, Cuff Size: Large)   Pulse 66   Temp 97.5 °F (36.4 °C) (Temporal)   Resp 16   Ht 5' 7\" (1.702 m)   Wt 76.7 kg (169 lb)   SpO2 96%   BMI 26.47 kg/m²     BP Readings from Last 3 " Encounters:   08/22/24 148/78   07/02/24 144/82   06/29/24 153/70     Wt Readings from Last 3 Encounters:   08/22/24 76.7 kg (169 lb)   07/02/24 75.8 kg (167 lb)   06/29/24 75.1 kg (165 lb 9.1 oz)         Physical Exam  Constitutional:       General: She is not in acute distress.  Eyes:      General: No scleral icterus.     Extraocular Movements: Extraocular movements intact.      Conjunctiva/sclera: Conjunctivae normal.      Pupils: Pupils are equal, round, and reactive to light.   Neck:      Thyroid: No thyroid mass or thyromegaly.      Vascular: Normal carotid pulses. No carotid bruit.   Cardiovascular:      Rate and Rhythm: Normal rate and regular rhythm.      Heart sounds: No murmur heard.     No gallop.   Pulmonary:      Effort: Pulmonary effort is normal.      Breath sounds: Normal breath sounds. No wheezing or rales.   Musculoskeletal:      Right lower leg: No edema.      Left lower leg: No edema.   Lymphadenopathy:      Cervical: No cervical adenopathy.   Skin:     Findings: No rash.   Neurological:      General: No focal deficit present.      Mental Status: She is alert and oriented to person, place, and time.   Psychiatric:         Mood and Affect: Mood normal.

## 2024-08-26 ENCOUNTER — TELEPHONE (OUTPATIENT)
Age: 84
End: 2024-08-26

## 2024-08-26 NOTE — TELEPHONE ENCOUNTER
Patient was just in to see Dr. Roth and she is scheduled for an MRI. Dr. Roth told her he was sending a script in for a few pills for the MRI. I did not see anything sent yet.

## 2024-08-29 ENCOUNTER — APPOINTMENT (OUTPATIENT)
Dept: LAB | Age: 84
End: 2024-08-29
Payer: MEDICARE

## 2024-08-29 DIAGNOSIS — Z87.440 HISTORY OF RECURRENT UTIS: ICD-10-CM

## 2024-08-29 LAB
BUN SERPL-MCNC: 15 MG/DL (ref 5–25)
CREAT SERPL-MCNC: 0.69 MG/DL (ref 0.6–1.3)
GFR SERPL CREATININE-BSD FRML MDRD: 79 ML/MIN/1.73SQ M

## 2024-08-29 PROCEDURE — 84520 ASSAY OF UREA NITROGEN: CPT

## 2024-08-29 PROCEDURE — 82565 ASSAY OF CREATININE: CPT

## 2024-08-29 PROCEDURE — 36415 COLL VENOUS BLD VENIPUNCTURE: CPT

## 2024-08-29 NOTE — TELEPHONE ENCOUNTER
Patient is calling in regarding getting a script for Ativan for her MRI.  Patient did not schedule her MRI until she has the medication.    Please send to [facility] on [address]    Please advise

## 2024-08-30 DIAGNOSIS — F40.240 CLAUSTROPHOBIA: Primary | ICD-10-CM

## 2024-08-30 RX ORDER — LORAZEPAM 0.5 MG/1
TABLET ORAL
Qty: 2 TABLET | Refills: 0 | Status: SHIPPED | OUTPATIENT
Start: 2024-08-30

## 2024-09-28 DIAGNOSIS — I10 ACCELERATED HYPERTENSION: ICD-10-CM

## 2024-09-28 RX ORDER — AMLODIPINE BESYLATE 2.5 MG/1
TABLET ORAL
Qty: 270 TABLET | Refills: 1 | Status: SHIPPED | OUTPATIENT
Start: 2024-09-28

## 2024-10-06 ENCOUNTER — HOSPITAL ENCOUNTER (OUTPATIENT)
Dept: MRI IMAGING | Facility: HOSPITAL | Age: 84
Discharge: HOME | End: 2024-10-06
Attending: FAMILY MEDICINE
Payer: MEDICARE

## 2024-10-06 DIAGNOSIS — M54.16 LUMBAR RADICULOPATHY: ICD-10-CM

## 2024-10-06 PROCEDURE — 72148 MRI LUMBAR SPINE W/O DYE: CPT

## 2024-10-14 ENCOUNTER — OFFICE VISIT (OUTPATIENT)
Dept: FAMILY MEDICINE CLINIC | Facility: CLINIC | Age: 84
End: 2024-10-14
Payer: MEDICARE

## 2024-10-14 VITALS
TEMPERATURE: 97.9 F | RESPIRATION RATE: 16 BRPM | OXYGEN SATURATION: 98 % | WEIGHT: 169 LBS | HEART RATE: 66 BPM | BODY MASS INDEX: 26.53 KG/M2 | SYSTOLIC BLOOD PRESSURE: 138 MMHG | DIASTOLIC BLOOD PRESSURE: 78 MMHG | HEIGHT: 67 IN

## 2024-10-14 DIAGNOSIS — R73.03 PREDIABETES: ICD-10-CM

## 2024-10-14 DIAGNOSIS — M47.816 FACET ARTHRITIS OF LUMBAR REGION: ICD-10-CM

## 2024-10-14 DIAGNOSIS — Z00.00 MEDICARE ANNUAL WELLNESS VISIT, SUBSEQUENT: ICD-10-CM

## 2024-10-14 DIAGNOSIS — E03.8 SUBCLINICAL HYPOTHYROIDISM: ICD-10-CM

## 2024-10-14 DIAGNOSIS — R32 URINARY INCONTINENCE, UNSPECIFIED TYPE: ICD-10-CM

## 2024-10-14 DIAGNOSIS — I10 PRIMARY HYPERTENSION: Primary | ICD-10-CM

## 2024-10-14 DIAGNOSIS — D72.819 LEUKOPENIA, UNSPECIFIED TYPE: ICD-10-CM

## 2024-10-14 DIAGNOSIS — Z23 ENCOUNTER FOR IMMUNIZATION: ICD-10-CM

## 2024-10-14 PROCEDURE — G0439 PPPS, SUBSEQ VISIT: HCPCS | Performed by: FAMILY MEDICINE

## 2024-10-14 PROCEDURE — 99214 OFFICE O/P EST MOD 30 MIN: CPT | Performed by: FAMILY MEDICINE

## 2024-10-14 PROCEDURE — G0008 ADMIN INFLUENZA VIRUS VAC: HCPCS | Performed by: FAMILY MEDICINE

## 2024-10-14 PROCEDURE — 90662 IIV NO PRSV INCREASED AG IM: CPT | Performed by: FAMILY MEDICINE

## 2024-10-14 NOTE — ASSESSMENT & PLAN NOTE
Past history of leukopenia.  Elevated WBC 6/2024 due to oral steroids      Lab Results   Component Value Date    WBC 13.21 (H) 06/29/2024    HGB 13.8 06/29/2024    HCT 41.5 06/29/2024    MCV 93 06/29/2024     06/29/2024     WBC   Date Value Ref Range Status   06/29/2024 13.21 (H) 4.31 - 10.16 Thousand/uL Final   05/03/2024 4.66 4.31 - 10.16 Thousand/uL Final   09/26/2023 4.80 4.31 - 10.16 Thousand/uL Final   06/11/2015 4.75 4.31 - 10.16 Thousand/uL Final   04/02/2014 5.24 4.31 - 10.16 Thousand/uL Final     Comment:     New Reference Range     06/2018  Vitamin B12 589.  Folate greater than 20.     Orders:    CBC and differential

## 2024-10-14 NOTE — ASSESSMENT & PLAN NOTE
No treatment to date. TSH 07/2017  thyroid ABs negative.          TSH           4.590 (H)           08/04/2022        Lab Results   Component Value Date    OYY0PSSHWVDP 3.707 05/03/2024       Orders:    TSH, 3rd generation with Free T4 reflex

## 2024-10-14 NOTE — ASSESSMENT & PLAN NOTE
Blood pressures have stable on Amlodipine 5 mg AM and 2.5 mg PM, Irbesartan 300 mg daily and Metoprolol 25 mg BID. Past history of hyponatremia and hypokalemia while on HCTZ.  08/2024 creatinine 0.69. GFR 79. 06/2024 Hgb 13.8. 05/2024 Electrolytes normal. Na+ 138. K+ 4.0.         BP Readings from Last 3 Encounters:   10/14/24 138/78   08/22/24 148/78   07/02/24 144/82       Orders:    Comprehensive metabolic panel    Continue with current medications. Labs 11/2024

## 2024-10-14 NOTE — PROGRESS NOTES
Ambulatory Visit  Name: Rosa Maria Grant      : 1940      MRN: [id number]  Encounter Provider: Valentino Roth MD  Encounter Date: 10/14/2024   Encounter department: [facility]    Assessment & Plan  Primary hypertension    Blood pressures have stable on Amlodipine 5 mg AM and 2.5 mg PM, Irbesartan 300 mg daily and Metoprolol 25 mg BID. Past history of hyponatremia and hypokalemia while on HCTZ.  2024 creatinine 0.69. GFR 79. 2024 Hgb 13.8. 2024 Electrolytes normal. Na+ 138. K+ 4.0.         BP Readings from Last 3 Encounters:   10/14/24 138/78   24 148/78   24 144/82       Orders:    Comprehensive metabolic panel    Continue with current medications. Labs 2024     Prediabetes    Lab Results   Component Value Date    HGBA1C 5.9 (H) 2024       Orders:    Hemoglobin A1C    Diet, weight loss and exercise.    Facet arthritis of lumbar region    10/2024 MRI of lumbar spine facet arthritis at multiple levels.  Moderate right foraminal narrowing at L1-L2.  Mild right foraminal narrowing at L5-S1.  Chronic lower back pain at times radiating into right gluteal area.  No pain with walking.  No leg weakness or numbness.  X rays R hip normal.  Prior PT and pain management evaluation. No previous spinal injections. She uses infrequent PRN Advil     Consider Pain Management reevaluation.       Leukopenia, unspecified type    Past history of leukopenia.  Elevated WBC 2024 due to oral steroids      Lab Results   Component Value Date    WBC 13.21 (H) 2024    HGB 13.8 2024    HCT 41.5 2024    MCV 93 2024     2024     WBC   Date Value Ref Range Status   2024 13.21 (H) 4.31 - 10.16 Thousand/uL Final   2024 4.66 4.31 - 10.16 Thousand/uL Final   2023 4.80 4.31 - 10.16 Thousand/uL Final   2015 4.75 4.31 - 10.16 Thousand/uL Final   2014 5.24 4.31 - 10.16 Thousand/uL Final     Comment:     New Reference Range     2018   Vitamin B12 589.  Folate greater than 20.     Orders:    CBC and differential    Subclinical hypothyroidism    No treatment to date. TSH 07/2017  thyroid ABs negative.          TSH           4.590 (H)           08/04/2022        Lab Results   Component Value Date    TOI0KEMCXAVD 3.707 05/03/2024       Orders:    TSH, 3rd generation with Free T4 reflex    Urinary incontinence, unspecified type         Medicare annual wellness visit, subsequent         Encounter for immunization    Orders:    influenza vaccine, high-dose, PF 0.5 mL (Fluzone High Dose)      Depression Screening and Follow-up Plan: Patient was screened for depression during today's encounter. They screened negative with a PHQ-2 score of 0.    Urinary Incontinence Plan of Care: counseling topics discussed: limiting fluid intake 3-4 hours before bed.       Preventive health issues were discussed with patient, and age appropriate screening tests were ordered as noted in patient's After Visit Summary. Personalized health advice and appropriate referrals for health education or preventive services given if needed, as noted in patient's After Visit Summary.    Continue with current medications.  Repeat labs 11/2024.    Flu vaccine today.    Office visit 6 months.        History of Present Illness       Follow up OV for chronic medical problems/AWV. Medications reviewed       07/2024 OV for recurrent pruritic dermatitis.  Patient was initially seen in urgent care with facial swelling/rash. She was treated for facial cellulitis with Clindamycin.  She developed pruritic rash on left arm and was seen in the emergency room at [facility]. She was prescribed prednisone  CBC at that time was normal.  She was seen here and was continued on prednisone along with OTC antihistamines.  She went back to the emergency room at [facility] and repeat CBC showed a mild leukocytosis likely secondary to oral steroids.  Urinalysis showed leukocytes urine culture mixed  contaminant.  She took several doses of Cipro she is currently asymptomatic.  She has a new rash pruritic right lower abdomen area  No history of seasonal allergies. Recent cat exposure. [place] allergy panel and food panel normal. Symptoms have resolved             Patient Care Team:  Valentino Roth MD as PCP - General  MD MAHESH George MD (Colon and Rectal Surgery)    Review of Systems   Constitutional:  Negative for appetite change, chills, fatigue, fever and unexpected weight change.   HENT:  Positive for congestion. Negative for ear pain, hearing loss, rhinorrhea, sore throat and trouble swallowing.         Chronic nasal congestion improved with Flonase.    Eyes:  Negative for visual disturbance.        Cataract surgery OU    Respiratory:  Negative for cough, shortness of breath and wheezing.    Cardiovascular:  Negative for chest pain, palpitations and leg swelling.        01/2020 admission for substernal chest pain. EKG NSR with frequent PVCs. she was sent to [facility]. Initial EKG NSR with fusion complexes. No acute changes. 2nd EKG sinus bradycardia with PACs. troponins x 3 negative.  Chest x-ray no active disease.  Stress echocardiogram normal.  EF 60%.   Gastrointestinal:  Negative for abdominal pain, blood in stool, constipation, diarrhea, nausea and vomiting.         Chronic constipation. Colonoscopy 03/2019 normal except for diverticulosis. + FH colon CA sister.     Endocrine: Negative for polydipsia and polyuria.         02/2015 DEXA scan normal.                   Genitourinary:  Negative for difficulty urinating, dysuria and hematuria.        History of recurrent UTIs followed by Urology. On Hiprex.  02/2019 kidney bladder ultrasound normal minimal PVR. 29 ML.  Simple cyst left adnexa without significant change.  pelvic u/s 08/2016 stable simple left ovarian cyst. 01/2017  normal at 9.5 followed by GYN.,    Musculoskeletal:  Positive for back pain. Negative  for arthralgias and myalgias.        See HPI    Skin:  Negative for rash.   Allergic/Immunologic:        See HPI    Neurological:  Negative for dizziness and headaches.   Hematological:  Negative for adenopathy. Does not bruise/bleed easily.                     Psychiatric/Behavioral:  Negative for dysphoric mood and sleep disturbance. The patient is not nervous/anxious.      Medical History Reviewed by provider this encounter:       Annual Wellness Visit Questionnaire   Rosa Maria is here for her Subsequent Wellness visit. Last Medicare Wellness visit information reviewed, patient interviewed and updates made to the record today.      Health Risk Assessment:   Patient rates overall health as very good. Patient feels that their physical health rating is same. Patient is very satisfied with their life. Eyesight was rated as same. Hearing was rated as same. Patient feels that their emotional and mental health rating is same. Patients states they are never, rarely angry. Patient states they are never, rarely unusually tired/fatigued. Pain experienced in the last 7 days has been some. Patient's pain rating has been 4/10. Patient states that she has experienced no weight loss or gain in last 6 months.     Depression Screening:   PHQ-2 Score: 0      Fall Risk Screening:   In the past year, patient has experienced: no history of falling in past year      Urinary Incontinence Screening:   Patient has leaked urine accidently in the last six months. UI managed without medication     Home Safety:  Patient does not have trouble with stairs inside or outside of their home. Patient has working smoke alarms and has working carbon monoxide detector. Home safety hazards include: none.     Nutrition:   Current diet is Regular. Eat very healthy    Medications:   Patient is currently taking over-the-counter supplements. OTC medications include: see medication list. Patient is able to manage medications.     Activities of Daily Living  (ADLs)/Instrumental Activities of Daily Living (IADLs):   Walk and transfer into and out of bed and chair?: Yes  Dress and groom yourself?: Yes    Bathe or shower yourself?: Yes    Feed yourself? Yes  Do your laundry/housekeeping?: Yes  Manage your money, pay your bills and track your expenses?: Yes  Make your own meals?: Yes    Do your own shopping?: Yes    Previous Hospitalizations:   Any hospitalizations or ED visits within the last 12 months?: Yes    How many hospitalizations have you had in the last year?: 1-2    Hospitalization Comments: Emergency room twice    Advance Care Planning:   Living will: Yes    Durable POA for healthcare: Yes    Advanced directive: Yes      PREVENTIVE SCREENINGS      Cardiovascular Screening:    General: Screening Current      Diabetes Screening:     General: Screening Current    Due for: Blood Glucose      Colorectal Cancer Screening:     General: Screening Not Indicated      Breast Cancer Screening:     General: Screening Current      Cervical Cancer Screening:    General: Screening Not Indicated      Osteoporosis Screening:    General: Screening Current      Abdominal Aortic Aneurysm (AAA) Screening:        General: Screening Not Indicated      Lung Cancer Screening:     General: Screening Not Indicated      Hepatitis C Screening:    General: Screening Not Indicated    Screening, Brief Intervention, and Referral to Treatment (SBIRT)    Screening  Typical number of drinks in a day: 0  Typical number of drinks in a week: 0  Interpretation: Low risk drinking behavior.    AUDIT-C Screenin) How often did you have a drink containing alcohol in the past year? monthly or less  2) How many drinks did you have on a typical day when you were drinking in the past year? 1 to 2  3) How often did you have 6 or more drinks on one occasion in the past year? never    AUDIT-C Score: 1  Interpretation: Score 0-2 (female): Negative screen for alcohol misuse    Single Item Drug Screening:  How  "often have you used an illegal drug (including marijuana) or a prescription medication for non-medical reasons in the past year? never    Single Item Drug Screen Score: 0  Interpretation: Negative screen for possible drug use disorder    Brief Intervention  Alcohol & drug use screenings were reviewed. No concerns regarding substance use disorder identified.     Other Counseling Topics:   Calcium and vitamin D intake and regular weightbearing exercise.     Social Determinants of Health     Financial Resource Strain: Low Risk  (10/13/2023)    Overall Financial Resource Strain (CARDIA)     Difficulty of Paying Living Expenses: Not hard at all   Food Insecurity: No Food Insecurity (10/7/2024)    Hunger Vital Sign     Worried About Running Out of Food in the Last Year: Never true     Ran Out of Food in the Last Year: Never true   Transportation Needs: No Transportation Needs (10/7/2024)    PRAPARE - Transportation     Lack of Transportation (Medical): No     Lack of Transportation (Non-Medical): No   Housing Stability: Low Risk  (10/7/2024)    Housing Stability Vital Sign     Unable to Pay for Housing in the Last Year: No     Number of Times Moved in the Last Year: 0     Homeless in the Last Year: No   Utilities: Not At Risk (10/7/2024)    [facility] Utilities     Threatened with loss of utilities: No         Objective     /78 (BP Location: Left arm, Patient Position: Sitting, Cuff Size: Standard)   Pulse 66   Temp 97.9 °F (36.6 °C)   Resp 16   Ht 5' 7\" (1.702 m)   Wt 76.7 kg (169 lb)   SpO2 98%   BMI 26.47 kg/m²     Wt Readings from Last 3 Encounters:   10/14/24 76.7 kg (169 lb)   08/22/24 76.7 kg (169 lb)   07/02/24 75.8 kg (167 lb)         Physical Exam  Constitutional:       General: She is not in acute distress.  Eyes:      General: No scleral icterus.     Conjunctiva/sclera: Conjunctivae normal.   Neck:      Thyroid: No thyroid mass or thyromegaly.      Vascular: Normal carotid pulses. No carotid bruit. "   Cardiovascular:      Rate and Rhythm: Normal rate and regular rhythm.      Heart sounds: No murmur heard.     No gallop.   Pulmonary:      Effort: Pulmonary effort is normal.      Breath sounds: Normal breath sounds. No wheezing or rales.   Musculoskeletal:      Lumbar back: Normal range of motion. Negative right straight leg raise test and negative left straight leg raise test.      Right lower leg: No edema.      Left lower leg: No edema.   Lymphadenopathy:      Cervical: No cervical adenopathy.      Upper Body:      Right upper body: No supraclavicular adenopathy.      Left upper body: No supraclavicular adenopathy.   Skin:     Findings: No rash.   Neurological:      General: No focal deficit present.      Mental Status: She is alert and oriented to person, place, and time.      Motor: No weakness or atrophy.      Deep Tendon Reflexes: Reflexes normal.      Comments: Strength in lower extremities normal.  Dorsiflexion/plantarflexion normal.   Psychiatric:         Mood and Affect: Mood normal.         Cognition and Memory: Cognition normal.

## 2024-10-14 NOTE — ASSESSMENT & PLAN NOTE
Lab Results   Component Value Date    HGBA1C 5.9 (H) 05/03/2024       Orders:    Hemoglobin A1C    Diet, weight loss and exercise.

## 2024-10-14 NOTE — PROGRESS NOTES
Ambulatory Visit  Name: Rosa Maria Grant      : 1940      MRN: [id number]  Encounter Provider: Valentino Roth MD  Encounter Date: 10/14/2024   Encounter department: [facility]    Assessment & Plan       Preventive health issues were discussed with patient, and age appropriate screening tests were ordered as noted in patient's After Visit Summary. Personalized health advice and appropriate referrals for health education or preventive services given if needed, as noted in patient's After Visit Summary.    History of Present Illness   {?Quick Links Encounters * My Last Note * Last Note in Specialty * Snapshot * Since Last Visit * History :03605}  HPI   Patient Care Team:  Valentino Roth MD as PCP - General  MD MAHESH George MD (Colon and Rectal Surgery)    Review of Systems  Medical History Reviewed by provider this encounter:       Annual Wellness Visit Questionnaire   Annual Wellness Visit  Social Determinants of Health     Financial Resource Strain: Low Risk  (10/13/2023)    Overall Financial Resource Strain (CARDIA)     Difficulty of Paying Living Expenses: Not hard at all   Food Insecurity: No Food Insecurity (10/7/2024)    Hunger Vital Sign     Worried About Running Out of Food in the Last Year: Never true     Ran Out of Food in the Last Year: Never true   Transportation Needs: No Transportation Needs (10/7/2024)    PRAPARE - Transportation     Lack of Transportation (Medical): No     Lack of Transportation (Non-Medical): No   Housing Stability: Low Risk  (10/7/2024)    Housing Stability Vital Sign     Unable to Pay for Housing in the Last Year: No     Number of Times Moved in the Last Year: 0     Homeless in the Last Year: No   Utilities: Not At Risk (10/7/2024)    [facility] Utilities     Threatened with loss of utilities: No     No results found.    Objective   {?Quick Links Trend Vitals * Enter New Vitals * Results Review * Timeline (Adult) * Labs * Imaging *  "Cardiology * Procedures * Lung Cancer Screening * Surgical eConsent :00954}  /78 (BP Location: Left arm, Patient Position: Sitting, Cuff Size: Standard)   Pulse 66   Temp 97.9 °F (36.6 °C)   Resp 16   Ht 5' 7\" (1.702 m)   Wt 76.7 kg (169 lb)   SpO2 98%   BMI 26.47 kg/m²     Physical Exam  {Administrative / Billing Section (Optional):99185}  "

## 2024-10-14 NOTE — PATIENT INSTRUCTIONS
Medicare Preventive Visit Patient Instructions  Thank you for completing your Welcome to Medicare Visit or Medicare Annual Wellness Visit today. Your next wellness visit will be due in one year (10/15/2025).  The screening/preventive services that you may require over the next 5-10 years are detailed below. Some tests may not apply to you based off risk factors and/or age. Screening tests ordered at today's visit but not completed yet may show as past due. Also, please note that scanned in results may not display below.  Preventive Screenings:  Service Recommendations Previous Testing/Comments   Colorectal Cancer Screening  * Colonoscopy    * Fecal Occult Blood Test (FOBT)/Fecal Immunochemical Test (FIT)  * Fecal DNA/Cologuard Test  * Flexible Sigmoidoscopy Age: 45-75 years old   Colonoscopy: every 10 years (may be performed more frequently if at higher risk)  OR  FOBT/FIT: every 1 year  OR  Cologuard: every 3 years  OR  Sigmoidoscopy: every 5 years  Screening may be recommended earlier than age 45 if at higher risk for colorectal cancer. Also, an individualized decision between you and your healthcare provider will decide whether screening between the ages of 76-85 would be appropriate. Colonoscopy: 08/03/2023  FOBT/FIT: Not on file  Cologuard: Not on file  Sigmoidoscopy: Not on file          Breast Cancer Screening Age: 40+ years old  Frequency: every 1-2 years  Not required if history of left and right mastectomy Mammogram: 03/15/2024        Cervical Cancer Screening Between the ages of 21-29, pap smear recommended once every 3 years.   Between the ages of 30-65, can perform pap smear with HPV co-testing every 5 years.   Recommendations may differ for women with a history of total hysterectomy, cervical cancer, or abnormal pap smears in past. Pap Smear: 05/09/2024        Hepatitis C Screening Once for adults born between 1945 and 1965  More frequently in patients at high risk for Hepatitis C Hep C Antibody: Not  on file        Diabetes Screening 1-2 times per year if you're at risk for diabetes or have pre-diabetes Fasting glucose: 97 mg/dL (5/3/2024)  A1C: 5.9 % (5/3/2024)      Cholesterol Screening Once every 5 years if you don't have a lipid disorder. May order more often based on risk factors. Lipid panel: 12/14/2020          Other Preventive Screenings Covered by Medicare:  Abdominal Aortic Aneurysm (AAA) Screening: covered once if your at risk. You're considered to be at risk if you have a family history of AAA.  Lung Cancer Screening: covers low dose CT scan once per year if you meet all of the following conditions: (1) Age 55-77; (2) No signs or symptoms of lung cancer; (3) Current smoker or have quit smoking within the last 15 years; (4) You have a tobacco smoking history of at least 20 pack years (packs per day multiplied by number of years you smoked); (5) You get a written order from a healthcare provider.  Glaucoma Screening: covered annually if you're considered high risk: (1) You have diabetes OR (2) Family history of glaucoma OR (3)  aged 50 and older OR (4)  American aged 65 and older  Osteoporosis Screening: covered every 2 years if you meet one of the following conditions: (1) You're estrogen deficient and at risk for osteoporosis based off medical history and other findings; (2) Have a vertebral abnormality; (3) On glucocorticoid therapy for more than 3 months; (4) Have primary hyperparathyroidism; (5) On osteoporosis medications and need to assess response to drug therapy.   Last bone density test (DXA Scan): 02/05/2015.  HIV Screening: covered annually if you're between the age of 15-65. Also covered annually if you are younger than 15 and older than 65 with risk factors for HIV infection. For pregnant patients, it is covered up to 3 times per pregnancy.    Immunizations:  Immunization Recommendations   Influenza Vaccine Annual influenza vaccination during flu season is  recommended for all persons aged >= 6 months who do not have contraindications   Pneumococcal Vaccine   * Pneumococcal conjugate vaccine = PCV13 (Prevnar 13), PCV15 (Vaxneuvance), PCV20 (Prevnar 20)  * Pneumococcal polysaccharide vaccine = PPSV23 (Pneumovax) Adults 19-65 yo with certain risk factors or if 65+ yo  If never received any pneumonia vaccine: recommend Prevnar 20 (PCV20)  Give PCV20 if previously received 1 dose of PCV13 or PPSV23   Hepatitis B Vaccine 3 dose series if at intermediate or high risk (ex: diabetes, end stage renal disease, liver disease)   Respiratory syncytial virus (RSV) Vaccine - COVERED BY MEDICARE PART D  * RSVPreF3 (Arexvy) CDC recommends that adults 60 years of age and older may receive a single dose of RSV vaccine using shared clinical decision-making (SCDM)   Tetanus (Td) Vaccine - COST NOT COVERED BY MEDICARE PART B Following completion of primary series, a booster dose should be given every 10 years to maintain immunity against tetanus. Td may also be given as tetanus wound prophylaxis.   Tdap Vaccine - COST NOT COVERED BY MEDICARE PART B Recommended at least once for all adults. For pregnant patients, recommended with each pregnancy.   Shingles Vaccine (Shingrix) - COST NOT COVERED BY MEDICARE PART B  2 shot series recommended in those 19 years and older who have or will have weakened immune systems or those 50 years and older     Health Maintenance Due:      Topic Date Due   • Breast Cancer Screening: Mammogram  03/15/2025   • Colorectal Cancer Screening  Discontinued     Immunizations Due:      Topic Date Due   • Influenza Vaccine (1) 09/01/2024   • COVID-19 Vaccine (6 - 2023-24 season) 09/01/2024     Advance Directives   What are advance directives?  Advance directives are legal documents that state your wishes and plans for medical care. These plans are made ahead of time in case you lose your ability to make decisions for yourself. Advance directives can apply to any medical  decision, such as the treatments you want, and if you want to donate organs.   What are the types of advance directives?  There are many types of advance directives, and each state has rules about how to use them. You may choose a combination of any of the following:  Living will:  This is a written record of the treatment you want. You can also choose which treatments you do not want, which to limit, and which to stop at a certain time. This includes surgery, medicine, IV fluid, and tube feedings.   Durable power of  for healthcare (DPAHC):  This is a written record that states who you want to make healthcare choices for you when you are unable to make them for yourself. This person, called a proxy, is usually a family member or a friend. You may choose more than 1 proxy.  Do not resuscitate (DNR) order:  A DNR order is used in case your heart stops beating or you stop breathing. It is a request not to have certain forms of treatment, such as CPR. A DNR order may be included in other types of advance directives.  Medical directive:  This covers the care that you want if you are in a coma, near death, or unable to make decisions for yourself. You can list the treatments you want for each condition. Treatment may include pain medicine, surgery, blood transfusions, dialysis, IV or tube feedings, and a ventilator (breathing machine).  Values history:  This document has questions about your views, beliefs, and how you feel and think about life. This information can help others choose the care that you would choose.  Why are advance directives important?  An advance directive helps you control your care. Although spoken wishes may be used, it is better to have your wishes written down. Spoken wishes can be misunderstood, or not followed. Treatments may be given even if you do not want them. An advance directive may make it easier for your family to make difficult choices about your care.   Urinary Incontinence    Urinary incontinence (UI)  is when you lose control of your bladder. UI develops because your bladder cannot store or empty urine properly. The 3 most common types of UI are stress incontinence, urge incontinence, or both.  Medicines:   May be given to help strengthen your bladder control. Report any side effects of medication to your healthcare provider.  Do pelvic muscle exercises often:  Your pelvic muscles help you stop urinating. Squeeze these muscles tight for 5 seconds, then relax for 5 seconds. Gradually work up to squeezing for 10 seconds. Do 3 sets of 15 repetitions a day, or as directed. This will help strengthen your pelvic muscles and improve bladder control.  Train your bladder:  Go to the bathroom at set times, such as every 2 hours, even if you do not feel the urge to go. You can also try to hold your urine when you feel the urge to go. For example, hold your urine for 5 minutes when you feel the urge to go. As that becomes easier, hold your urine for 10 minutes.   Self-care:   Keep a UI record.  Write down how often you leak urine and how much you leak. Make a note of what you were doing when you leaked urine.  Drink liquids as directed. You may need to limit the amount of liquid you drink to help control your urine leakage. Do not drink any liquid right before you go to bed. Limit or do not have drinks that contain caffeine or alcohol.   Prevent constipation.  Eat a variety of high-fiber foods. Good examples are high-fiber cereals, beans, vegetables, and whole-grain breads. Walking is the best way to trigger your intestines to have a bowel movement.  Exercise regularly and maintain a healthy weight.  Weight loss and exercise will decrease pressure on your bladder and help you control your leakage.   Use a catheter as directed  to help empty your bladder. A catheter is a tiny, plastic tube that is put into your bladder to drain your urine.   Go to behavior therapy as directed.  Behavior therapy  may be used to help you learn to control your urge to urinate.    Weight Management   Why it is important to manage your weight:  Being overweight increases your risk of health conditions such as heart disease, high blood pressure, type 2 diabetes, and certain types of cancer. It can also increase your risk for osteoarthritis, sleep apnea, and other respiratory problems. Aim for a slow, steady weight loss. Even a small amount of weight loss can lower your risk of health problems.  How to lose weight safely:  A safe and healthy way to lose weight is to eat fewer calories and get regular exercise. You can lose up about 1 pound a week by decreasing the number of calories you eat by 500 calories each day.   Healthy meal plan for weight management:  A healthy meal plan includes a variety of foods, contains fewer calories, and helps you stay healthy. A healthy meal plan includes the following:  Eat whole-grain foods more often.  A healthy meal plan should contain fiber. Fiber is the part of grains, fruits, and vegetables that is not broken down by your body. Whole-grain foods are healthy and provide extra fiber in your diet. Some examples of whole-grain foods are whole-wheat breads and pastas, oatmeal, brown rice, and bulgur.  Eat a variety of vegetables every day.  Include dark, leafy greens such as spinach, kale, anastasia greens, and mustard greens. Eat yellow and orange vegetables such as carrots, sweet potatoes, and winter squash.   Eat a variety of fruits every day.  Choose fresh or canned fruit (canned in its own juice or light syrup) instead of juice. Fruit juice has very little or no fiber.  Eat low-fat dairy foods.  Drink fat-free (skim) milk or 1% milk. Eat fat-free yogurt and low-fat cottage cheese. Try low-fat cheeses such as mozzarella and other reduced-fat cheeses.  Choose meat and other protein foods that are low in fat.  Choose beans or other legumes such as split peas or lentils. Choose fish, skinless  poultry (chicken or turkey), or lean cuts of red meat (beef or pork). Before you cook meat or poultry, cut off any visible fat.   Use less fat and oil.  Try baking foods instead of frying them. Add less fat, such as margarine, sour cream, regular salad dressing and mayonnaise to foods. Eat fewer high-fat foods. Some examples of high-fat foods include french fries, doughnuts, ice cream, and cakes.  Eat fewer sweets.  Limit foods and drinks that are high in sugar. This includes candy, cookies, regular soda, and sweetened drinks.  Exercise:  Exercise at least 30 minutes per day on most days of the week. Some examples of exercise include walking, biking, dancing, and swimming. You can also fit in more physical activity by taking the stairs instead of the elevator or parking farther away from stores. Ask your healthcare provider about the best exercise plan for you.      © Copyright [employer] 2018 Information is for End User's use only and may not be sold, redistributed or otherwise used for commercial purposes. All illustrations and images included in CareNotes® are the copyrighted property of [employer], [employer] or [employer]      Medicare Preventive Visit Patient Instructions  Thank you for completing your Welcome to Medicare Visit or Medicare Annual Wellness Visit today. Your next wellness visit will be due in one year (10/15/2025).  The screening/preventive services that you may require over the next 5-10 years are detailed below. Some tests may not apply to you based off risk factors and/or age. Screening tests ordered at today's visit but not completed yet may show as past due. Also, please note that scanned in results may not display below.  Preventive Screenings:  Service Recommendations Previous Testing/Comments   Colorectal Cancer Screening  * Colonoscopy    * Fecal Occult Blood Test (FOBT)/Fecal Immunochemical Test (FIT)  * Fecal DNA/Cologuard Test  * Flexible Sigmoidoscopy Age: 45-75 years old    Colonoscopy: every 10 years (may be performed more frequently if at higher risk)  OR  FOBT/FIT: every 1 year  OR  Cologuard: every 3 years  OR  Sigmoidoscopy: every 5 years  Screening may be recommended earlier than age 45 if at higher risk for colorectal cancer. Also, an individualized decision between you and your healthcare provider will decide whether screening between the ages of 76-85 would be appropriate. Colonoscopy: 08/03/2023  FOBT/FIT: Not on file  Cologuard: Not on file  Sigmoidoscopy: Not on file          Breast Cancer Screening Age: 40+ years old  Frequency: every 1-2 years  Not required if history of left and right mastectomy Mammogram: 03/15/2024        Cervical Cancer Screening Between the ages of 21-29, pap smear recommended once every 3 years.   Between the ages of 30-65, can perform pap smear with HPV co-testing every 5 years.   Recommendations may differ for women with a history of total hysterectomy, cervical cancer, or abnormal pap smears in past. Pap Smear: 05/09/2024        Hepatitis C Screening Once for adults born between 1945 and 1965  More frequently in patients at high risk for Hepatitis C Hep C Antibody: Not on file        Diabetes Screening 1-2 times per year if you're at risk for diabetes or have pre-diabetes Fasting glucose: 97 mg/dL (5/3/2024)  A1C: 5.9 % (5/3/2024)      Cholesterol Screening Once every 5 years if you don't have a lipid disorder. May order more often based on risk factors. Lipid panel: 12/14/2020          Other Preventive Screenings Covered by Medicare:  Abdominal Aortic Aneurysm (AAA) Screening: covered once if your at risk. You're considered to be at risk if you have a family history of AAA.  Lung Cancer Screening: covers low dose CT scan once per year if you meet all of the following conditions: (1) Age 55-77; (2) No signs or symptoms of lung cancer; (3) Current smoker or have quit smoking within the last 15 years; (4) You have a tobacco smoking history of at  least 20 pack years (packs per day multiplied by number of years you smoked); (5) You get a written order from a healthcare provider.  Glaucoma Screening: covered annually if you're considered high risk: (1) You have diabetes OR (2) Family history of glaucoma OR (3)  aged 50 and older OR (4)  American aged 65 and older  Osteoporosis Screening: covered every 2 years if you meet one of the following conditions: (1) You're estrogen deficient and at risk for osteoporosis based off medical history and other findings; (2) Have a vertebral abnormality; (3) On glucocorticoid therapy for more than 3 months; (4) Have primary hyperparathyroidism; (5) On osteoporosis medications and need to assess response to drug therapy.   Last bone density test (DXA Scan): 02/05/2015.  HIV Screening: covered annually if you're between the age of 15-65. Also covered annually if you are younger than 15 and older than 65 with risk factors for HIV infection. For pregnant patients, it is covered up to 3 times per pregnancy.    Immunizations:  Immunization Recommendations   Influenza Vaccine Annual influenza vaccination during flu season is recommended for all persons aged >= 6 months who do not have contraindications   Pneumococcal Vaccine   * Pneumococcal conjugate vaccine = PCV13 (Prevnar 13), PCV15 (Vaxneuvance), PCV20 (Prevnar 20)  * Pneumococcal polysaccharide vaccine = PPSV23 (Pneumovax) Adults 19-63 yo with certain risk factors or if 65+ yo  If never received any pneumonia vaccine: recommend Prevnar 20 (PCV20)  Give PCV20 if previously received 1 dose of PCV13 or PPSV23   Hepatitis B Vaccine 3 dose series if at intermediate or high risk (ex: diabetes, end stage renal disease, liver disease)   Respiratory syncytial virus (RSV) Vaccine - COVERED BY MEDICARE PART D  * RSVPreF3 (Arexvy) CDC recommends that adults 60 years of age and older may receive a single dose of RSV vaccine using shared clinical decision-making  (SCDM)   Tetanus (Td) Vaccine - COST NOT COVERED BY MEDICARE PART B Following completion of primary series, a booster dose should be given every 10 years to maintain immunity against tetanus. Td may also be given as tetanus wound prophylaxis.   Tdap Vaccine - COST NOT COVERED BY MEDICARE PART B Recommended at least once for all adults. For pregnant patients, recommended with each pregnancy.   Shingles Vaccine (Shingrix) - COST NOT COVERED BY MEDICARE PART B  2 shot series recommended in those 19 years and older who have or will have weakened immune systems or those 50 years and older     Health Maintenance Due:      Topic Date Due   • Breast Cancer Screening: Mammogram  03/15/2025   • Colorectal Cancer Screening  Discontinued     Immunizations Due:      Topic Date Due   • Influenza Vaccine (1) 09/01/2024   • COVID-19 Vaccine (6 - 2023-24 season) 09/01/2024     Advance Directives   What are advance directives?  Advance directives are legal documents that state your wishes and plans for medical care. These plans are made ahead of time in case you lose your ability to make decisions for yourself. Advance directives can apply to any medical decision, such as the treatments you want, and if you want to donate organs.   What are the types of advance directives?  There are many types of advance directives, and each state has rules about how to use them. You may choose a combination of any of the following:  Living will:  This is a written record of the treatment you want. You can also choose which treatments you do not want, which to limit, and which to stop at a certain time. This includes surgery, medicine, IV fluid, and tube feedings.   Durable power of  for healthcare (DPAHC):  This is a written record that states who you want to make healthcare choices for you when you are unable to make them for yourself. This person, called a proxy, is usually a family member or a friend. You may choose more than 1  proxy.  Do not resuscitate (DNR) order:  A DNR order is used in case your heart stops beating or you stop breathing. It is a request not to have certain forms of treatment, such as CPR. A DNR order may be included in other types of advance directives.  Medical directive:  This covers the care that you want if you are in a coma, near death, or unable to make decisions for yourself. You can list the treatments you want for each condition. Treatment may include pain medicine, surgery, blood transfusions, dialysis, IV or tube feedings, and a ventilator (breathing machine).  Values history:  This document has questions about your views, beliefs, and how you feel and think about life. This information can help others choose the care that you would choose.  Why are advance directives important?  An advance directive helps you control your care. Although spoken wishes may be used, it is better to have your wishes written down. Spoken wishes can be misunderstood, or not followed. Treatments may be given even if you do not want them. An advance directive may make it easier for your family to make difficult choices about your care.   Urinary Incontinence   Urinary incontinence (UI)  is when you lose control of your bladder. UI develops because your bladder cannot store or empty urine properly. The 3 most common types of UI are stress incontinence, urge incontinence, or both.  Medicines:   May be given to help strengthen your bladder control. Report any side effects of medication to your healthcare provider.  Do pelvic muscle exercises often:  Your pelvic muscles help you stop urinating. Squeeze these muscles tight for 5 seconds, then relax for 5 seconds. Gradually work up to squeezing for 10 seconds. Do 3 sets of 15 repetitions a day, or as directed. This will help strengthen your pelvic muscles and improve bladder control.  Train your bladder:  Go to the bathroom at set times, such as every 2 hours, even if you do not feel  the urge to go. You can also try to hold your urine when you feel the urge to go. For example, hold your urine for 5 minutes when you feel the urge to go. As that becomes easier, hold your urine for 10 minutes.   Self-care:   Keep a UI record.  Write down how often you leak urine and how much you leak. Make a note of what you were doing when you leaked urine.  Drink liquids as directed. You may need to limit the amount of liquid you drink to help control your urine leakage. Do not drink any liquid right before you go to bed. Limit or do not have drinks that contain caffeine or alcohol.   Prevent constipation.  Eat a variety of high-fiber foods. Good examples are high-fiber cereals, beans, vegetables, and whole-grain breads. Walking is the best way to trigger your intestines to have a bowel movement.  Exercise regularly and maintain a healthy weight.  Weight loss and exercise will decrease pressure on your bladder and help you control your leakage.   Use a catheter as directed  to help empty your bladder. A catheter is a tiny, plastic tube that is put into your bladder to drain your urine.   Go to behavior therapy as directed.  Behavior therapy may be used to help you learn to control your urge to urinate.    Weight Management   Why it is important to manage your weight:  Being overweight increases your risk of health conditions such as heart disease, high blood pressure, type 2 diabetes, and certain types of cancer. It can also increase your risk for osteoarthritis, sleep apnea, and other respiratory problems. Aim for a slow, steady weight loss. Even a small amount of weight loss can lower your risk of health problems.  How to lose weight safely:  A safe and healthy way to lose weight is to eat fewer calories and get regular exercise. You can lose up about 1 pound a week by decreasing the number of calories you eat by 500 calories each day.   Healthy meal plan for weight management:  A healthy meal plan includes a  variety of foods, contains fewer calories, and helps you stay healthy. A healthy meal plan includes the following:  Eat whole-grain foods more often.  A healthy meal plan should contain fiber. Fiber is the part of grains, fruits, and vegetables that is not broken down by your body. Whole-grain foods are healthy and provide extra fiber in your diet. Some examples of whole-grain foods are whole-wheat breads and pastas, oatmeal, brown rice, and bulgur.  Eat a variety of vegetables every day.  Include dark, leafy greens such as spinach, kale, anastasia greens, and mustard greens. Eat yellow and orange vegetables such as carrots, sweet potatoes, and winter squash.   Eat a variety of fruits every day.  Choose fresh or canned fruit (canned in its own juice or light syrup) instead of juice. Fruit juice has very little or no fiber.  Eat low-fat dairy foods.  Drink fat-free (skim) milk or 1% milk. Eat fat-free yogurt and low-fat cottage cheese. Try low-fat cheeses such as mozzarella and other reduced-fat cheeses.  Choose meat and other protein foods that are low in fat.  Choose beans or other legumes such as split peas or lentils. Choose fish, skinless poultry (chicken or turkey), or lean cuts of red meat (beef or pork). Before you cook meat or poultry, cut off any visible fat.   Use less fat and oil.  Try baking foods instead of frying them. Add less fat, such as margarine, sour cream, regular salad dressing and mayonnaise to foods. Eat fewer high-fat foods. Some examples of high-fat foods include french fries, doughnuts, ice cream, and cakes.  Eat fewer sweets.  Limit foods and drinks that are high in sugar. This includes candy, cookies, regular soda, and sweetened drinks.  Exercise:  Exercise at least 30 minutes per day on most days of the week. Some examples of exercise include walking, biking, dancing, and swimming. You can also fit in more physical activity by taking the stairs instead of the elevator or parking farther  away from stores. Ask your healthcare provider about the best exercise plan for you.      © Copyright [employer] 2018 Information is for End User's use only and may not be sold, redistributed or otherwise used for commercial purposes. All illustrations and images included in CareNotes® are the copyrighted property of [employer] or [employer]

## 2024-10-14 NOTE — ASSESSMENT & PLAN NOTE
10/2024 MRI of lumbar spine facet arthritis at multiple levels.  Moderate right foraminal narrowing at L1-L2.  Mild right foraminal narrowing at L5-S1.  Chronic lower back pain at times radiating into right gluteal area.  No pain with walking.  No leg weakness or numbness.  X rays R hip normal.  Prior PT and pain management evaluation. No previous spinal injections. She uses infrequent PRN Advil     Consider Pain Management reevaluation.

## 2024-10-14 NOTE — PROGRESS NOTES
Ambulatory Visit  Name: Rosa Maria Grant      : 1940      MRN: [id number]  Encounter Provider: Valentino Roth MD  Encounter Date: 10/14/2024   Encounter department: [facility]    Assessment & Plan  Primary hypertension    Orders:    Comprehensive metabolic panel    Leukopenia, unspecified type    Orders:    CBC and differential    Prediabetes    Orders:    Hemoglobin A1C    Subclinical hypothyroidism    Orders:    TSH, 3rd generation with Free T4 reflex    Urinary incontinence, unspecified type         Medicare annual wellness visit, subsequent         Encounter for immunization    Orders:    influenza vaccine, high-dose, PF 0.5 mL (Fluzone High Dose)       Preventive health issues were discussed with patient, and age appropriate screening tests were ordered as noted in patient's After Visit Summary. Personalized health advice and appropriate referrals for health education or preventive services given if needed, as noted in patient's After Visit Summary.    History of Present Illness   {?Quick Links Encounters * My Last Note * Last Note in Specialty * Snapshot * Since Last Visit * History :68137}  HPI   Patient Care Team:  Valentino Roth MD as PCP - General  MD MAHESH George MD (Colon and Rectal Surgery)    Review of Systems  Medical History Reviewed by provider this encounter:       Annual Wellness Visit Questionnaire       Health Risk Assessment:   Patient rates overall health as very good. Patient feels that their physical health rating is same. Patient is very satisfied with their life. Eyesight was rated as same. Hearing was rated as same. Patient feels that their emotional and mental health rating is same. Patients states they are never, rarely angry. Patient states they are never, rarely unusually tired/fatigued. Pain experienced in the last 7 days has been some. Patient's pain rating has been 4/10. Patient states that she has experienced no weight loss or  gain in last 6 months.     Depression Screening:   PHQ-2 Score: 0      Fall Risk Screening:   In the past year, patient has experienced: no history of falling in past year      Urinary Incontinence Screening:   Patient has leaked urine accidently in the last six months.     Home Safety:  Patient does not have trouble with stairs inside or outside of their home. Patient has working smoke alarms and has working carbon monoxide detector. Home safety hazards include: none.     Nutrition:   Current diet is Regular. Eat very healthy    Medications:   Patient is currently taking over-the-counter supplements. OTC medications include: Vitamins calcium, cranberry, cinnamon. Patient is able to manage medications.     Activities of Daily Living (ADLs)/Instrumental Activities of Daily Living (IADLs):   Walk and transfer into and out of bed and chair?: Yes  Dress and groom yourself?: Yes    Bathe or shower yourself?: Yes    Feed yourself? Yes  Do your laundry/housekeeping?: Yes  Manage your money, pay your bills and track your expenses?: Yes  Make your own meals?: Yes    Do your own shopping?: Yes    Previous Hospitalizations:   Any hospitalizations or ED visits within the last 12 months?: Yes    How many hospitalizations have you had in the last year?: 1-2    Hospitalization Comments: Emergency room twice    Advance Care Planning:   Living will: Yes    Durable POA for healthcare: Yes    Advanced directive: Yes      Screening, Brief Intervention, and Referral to Treatment (SBIRT)    Screening  Typical number of drinks in a day: 0  Typical number of drinks in a week: 0  Interpretation: Low risk drinking behavior.    AUDIT-C Screenin) How often did you have a drink containing alcohol in the past year? monthly or less  2) How many drinks did you have on a typical day when you were drinking in the past year? 1 to 2  3) How often did you have 6 or more drinks on one occasion in the past year? never    AUDIT-C Score:  "1  Interpretation: Score 0-2 (female): Negative screen for alcohol misuse    Single Item Drug Screening:  How often have you used an illegal drug (including marijuana) or a prescription medication for non-medical reasons in the past year? never    Single Item Drug Screen Score: 0  Interpretation: Negative screen for possible drug use disorder    Social Determinants of Health     Financial Resource Strain: Low Risk  (10/13/2023)    Overall Financial Resource Strain (CARDIA)     Difficulty of Paying Living Expenses: Not hard at all   Food Insecurity: No Food Insecurity (10/7/2024)    Hunger Vital Sign     Worried About Running Out of Food in the Last Year: Never true     Ran Out of Food in the Last Year: Never true   Transportation Needs: No Transportation Needs (10/7/2024)    PRAPARE - Transportation     Lack of Transportation (Medical): No     Lack of Transportation (Non-Medical): No   Housing Stability: Low Risk  (10/7/2024)    Housing Stability Vital Sign     Unable to Pay for Housing in the Last Year: No     Number of Times Moved in the Last Year: 0     Homeless in the Last Year: No   Utilities: Not At Risk (10/7/2024)    [facility] Utilities     Threatened with loss of utilities: No     No results found.    Objective   {?Quick Links Trend Vitals * Enter New Vitals * Results Review * Timeline (Adult) * Labs * Imaging * Cardiology * Procedures * Lung Cancer Screening * Surgical eConsent :78496}  /78 (BP Location: Left arm, Patient Position: Sitting, Cuff Size: Standard)   Pulse 66   Temp 97.9 °F (36.6 °C)   Resp 16   Ht 5' 7\" (1.702 m)   Wt 76.7 kg (169 lb)   SpO2 98%   BMI 26.47 kg/m²     Physical Exam  {Administrative / Billing Section (Optional):95883}  "

## 2024-11-14 ENCOUNTER — APPOINTMENT (OUTPATIENT)
Dept: LAB | Age: 84
End: 2024-11-14
Payer: MEDICARE

## 2024-11-14 LAB
ALBUMIN SERPL BCG-MCNC: 3.9 G/DL (ref 3.5–5)
ALP SERPL-CCNC: 53 U/L (ref 34–104)
ALT SERPL W P-5'-P-CCNC: 18 U/L (ref 7–52)
ANION GAP SERPL CALCULATED.3IONS-SCNC: 6 MMOL/L (ref 4–13)
AST SERPL W P-5'-P-CCNC: 20 U/L (ref 13–39)
BASOPHILS # BLD AUTO: 0.03 THOUSANDS/ÂΜL (ref 0–0.1)
BASOPHILS NFR BLD AUTO: 1 % (ref 0–1)
BILIRUB SERPL-MCNC: 0.7 MG/DL (ref 0.2–1)
BUN SERPL-MCNC: 20 MG/DL (ref 5–25)
CALCIUM SERPL-MCNC: 8.8 MG/DL (ref 8.4–10.2)
CHLORIDE SERPL-SCNC: 104 MMOL/L (ref 96–108)
CO2 SERPL-SCNC: 30 MMOL/L (ref 21–32)
CREAT SERPL-MCNC: 0.79 MG/DL (ref 0.6–1.3)
EOSINOPHIL # BLD AUTO: 0.15 THOUSAND/ÂΜL (ref 0–0.61)
EOSINOPHIL NFR BLD AUTO: 3 % (ref 0–6)
ERYTHROCYTE [DISTWIDTH] IN BLOOD BY AUTOMATED COUNT: 11.8 % (ref 11.6–15.1)
EST. AVERAGE GLUCOSE BLD GHB EST-MCNC: 123 MG/DL
GFR SERPL CREATININE-BSD FRML MDRD: 68 ML/MIN/1.73SQ M
GLUCOSE P FAST SERPL-MCNC: 93 MG/DL (ref 65–99)
HBA1C MFR BLD: 5.9 %
HCT VFR BLD AUTO: 40.8 % (ref 34.8–46.1)
HGB BLD-MCNC: 13.3 G/DL (ref 11.5–15.4)
IMM GRANULOCYTES # BLD AUTO: 0.01 THOUSAND/UL (ref 0–0.2)
IMM GRANULOCYTES NFR BLD AUTO: 0 % (ref 0–2)
LYMPHOCYTES # BLD AUTO: 1.4 THOUSANDS/ÂΜL (ref 0.6–4.47)
LYMPHOCYTES NFR BLD AUTO: 27 % (ref 14–44)
MCH RBC QN AUTO: 30.6 PG (ref 26.8–34.3)
MCHC RBC AUTO-ENTMCNC: 32.6 G/DL (ref 31.4–37.4)
MCV RBC AUTO: 94 FL (ref 82–98)
MONOCYTES # BLD AUTO: 0.61 THOUSAND/ÂΜL (ref 0.17–1.22)
MONOCYTES NFR BLD AUTO: 12 % (ref 4–12)
NEUTROPHILS # BLD AUTO: 2.91 THOUSANDS/ÂΜL (ref 1.85–7.62)
NEUTS SEG NFR BLD AUTO: 57 % (ref 43–75)
NRBC BLD AUTO-RTO: 0 /100 WBCS
PLATELET # BLD AUTO: 276 THOUSANDS/UL (ref 149–390)
PMV BLD AUTO: 10.4 FL (ref 8.9–12.7)
POTASSIUM SERPL-SCNC: 4.2 MMOL/L (ref 3.5–5.3)
PROT SERPL-MCNC: 6.4 G/DL (ref 6.4–8.4)
RBC # BLD AUTO: 4.35 MILLION/UL (ref 3.81–5.12)
SODIUM SERPL-SCNC: 140 MMOL/L (ref 135–147)
T4 FREE SERPL-MCNC: 1.13 NG/DL (ref 0.61–1.12)
TSH SERPL DL<=0.05 MIU/L-ACNC: 5.17 UIU/ML (ref 0.45–4.5)
WBC # BLD AUTO: 5.11 THOUSAND/UL (ref 4.31–10.16)

## 2024-12-03 ENCOUNTER — TELEPHONE (OUTPATIENT)
Age: 84
End: 2024-12-03

## 2024-12-03 NOTE — TELEPHONE ENCOUNTER
Thyroid results are in over three weeks she would like to go over these labs as she seen it showed thyroid was up to a 5-6. Please Review and give a call back thank you

## 2024-12-05 DIAGNOSIS — E03.8 SUBCLINICAL HYPOTHYROIDISM: Primary | ICD-10-CM

## 2024-12-26 ENCOUNTER — APPOINTMENT (OUTPATIENT)
Dept: LAB | Age: 84
End: 2024-12-26
Payer: MEDICARE

## 2024-12-26 DIAGNOSIS — E03.8 SUBCLINICAL HYPOTHYROIDISM: ICD-10-CM

## 2024-12-26 LAB
T4 FREE SERPL-MCNC: 2.12 NG/DL (ref 0.61–1.12)
TSH SERPL DL<=0.05 MIU/L-ACNC: 3.03 UIU/ML (ref 0.45–4.5)

## 2024-12-26 PROCEDURE — 84439 ASSAY OF FREE THYROXINE: CPT

## 2024-12-26 PROCEDURE — 86376 MICROSOMAL ANTIBODY EACH: CPT

## 2024-12-26 PROCEDURE — 36415 COLL VENOUS BLD VENIPUNCTURE: CPT

## 2024-12-26 PROCEDURE — 84443 ASSAY THYROID STIM HORMONE: CPT

## 2024-12-27 LAB — THYROPEROXIDASE AB SERPL-ACNC: 33 IU/ML (ref 0–34)

## 2024-12-29 ENCOUNTER — RESULTS FOLLOW-UP (OUTPATIENT)
Dept: FAMILY MEDICINE CLINIC | Facility: CLINIC | Age: 84
End: 2024-12-29

## 2025-01-02 NOTE — TELEPHONE ENCOUNTER
Spoke with PT. She is agreeable with seeing an endocrinologist, and is requesting referral to be placed.    ThankYou

## 2025-01-03 ENCOUNTER — CONSULT (OUTPATIENT)
Dept: ENDOCRINOLOGY | Facility: CLINIC | Age: 85
End: 2025-01-03
Payer: MEDICARE

## 2025-01-03 VITALS
WEIGHT: 166.2 LBS | BODY MASS INDEX: 26.09 KG/M2 | TEMPERATURE: 97.7 F | OXYGEN SATURATION: 96 % | DIASTOLIC BLOOD PRESSURE: 78 MMHG | HEIGHT: 67 IN | HEART RATE: 74 BPM | SYSTOLIC BLOOD PRESSURE: 154 MMHG

## 2025-01-03 DIAGNOSIS — R73.03 PREDIABETES: ICD-10-CM

## 2025-01-03 DIAGNOSIS — E04.1 THYROID NODULE: ICD-10-CM

## 2025-01-03 DIAGNOSIS — M81.0 AGE RELATED OSTEOPOROSIS, UNSPECIFIED PATHOLOGICAL FRACTURE PRESENCE: ICD-10-CM

## 2025-01-03 DIAGNOSIS — E05.90 HYPERTHYROIDISM: Primary | ICD-10-CM

## 2025-01-03 DIAGNOSIS — R79.89 ELEVATED SERUM FREE T4 LEVEL: ICD-10-CM

## 2025-01-03 DIAGNOSIS — R79.89 ELEVATED SERUM FREE T4 LEVEL: Primary | ICD-10-CM

## 2025-01-03 PROCEDURE — 99204 OFFICE O/P NEW MOD 45 MIN: CPT | Performed by: INTERNAL MEDICINE

## 2025-01-03 NOTE — ASSESSMENT & PLAN NOTE
She reports early menopause age 43yrs. Cannot recall HRT.    She had serial DXA scans until 2015.    Today we discussed all aspects of osteoporosis including pathophysiology, risk factors, complications, diet, calcium 1200mg/day, vitamin D supplementation to maintain goal >30ng/dL, lifestyle modifications, medical fitness training, goals of therapy, follow up needs and medications including Alendronate, zolendronate, denosumab, romosozumab, foreo and tymlos.    We agreed to repeat a DXA scan to treat for a fracture risk as she has good QoL that can be preserved.  May follow with pcp or myself.

## 2025-01-03 NOTE — TELEPHONE ENCOUNTER
See message.  Order placed for Endocrinology referral.     ----- Message -----  From: Kim Garcia  Sent: 1/2/2025   9:10 AM EST  To: [facility]

## 2025-01-03 NOTE — PROGRESS NOTES
"    Follow-up Patient Progress Note      CC: prediabetes, hyperthyroidism    History of Present Illness:   84yr female with prediabetes, HTN, HLD, GERD, diverticulosis, concern for osteoporosis, constipation and concern for hyperthyroidism and vit D deficiency.      Ace/ARB: irbesartan  Statin: No    Physical Exam:  Body mass index is 26.03 kg/m².  /78 (BP Location: Left arm, Patient Position: Sitting, Cuff Size: Standard)   Pulse 74   Temp 97.7 °F (36.5 °C) (Tympanic)   Ht 5' 7\" (1.702 m)   Wt 75.4 kg (166 lb 3.2 oz)   SpO2 96%   BMI 26.03 kg/m²    Vitals:    01/03/25 1352   Weight: 75.4 kg (166 lb 3.2 oz)        Physical Exam  Constitutional:       General: She is not in acute distress.     Appearance: She is well-developed.   HENT:      Head: Normocephalic and atraumatic.      Nose: Nose normal.   Eyes:      Conjunctiva/sclera: Conjunctivae normal.   Pulmonary:      Effort: Pulmonary effort is normal.   Abdominal:      General: There is no distension.   Musculoskeletal:      Cervical back: Normal range of motion and neck supple.   Skin:     Findings: No rash.      Comments: No icterus   Neurological:      Mental Status: She is alert and oriented to person, place, and time.         Labs:   Lab Results   Component Value Date    HGBA1C 5.9 (H) 11/14/2024       Lab Results   Component Value Date    [id number] 3.033 12/26/2024       Lab Results   Component Value Date    CREATININE 0.79 11/14/2024    CREATININE 0.69 08/29/2024    CREATININE 0.75 06/29/2024    BUN 20 11/14/2024     06/19/2015    K 4.2 11/14/2024     11/14/2024    CO2 30 11/14/2024     GFR, Calculated   Date Value Ref Range Status   01/25/2020 69 >60 mL/min/1.73m2 Final     Comment:     Please refer to initial GFR, CALC footnote     eGFR   Date Value Ref Range Status   11/14/2024 68 ml/min/1.73sq m Final       Lab Results   Component Value Date    ALT 18 11/14/2024    AST 20 11/14/2024    ALKPHOS 53 11/14/2024    BILITOT 0.46 " 06/11/2015       Lab Results   Component Value Date    CHOLESTEROL 187 12/14/2020    CHOLESTEROL 193 05/21/2019    CHOLESTEROL 186 05/24/2018     Lab Results   Component Value Date    HDL 55 12/14/2020    HDL 52 05/21/2019    HDL 55 05/24/2018     Lab Results   Component Value Date    TRIG 88 12/14/2020    TRIG 95 05/21/2019    TRIG 81 05/24/2018     Lab Results   Component Value Date    NONHDLC 132 12/14/2020    NONHDLC 141 05/21/2019    NONHDLC 131 05/24/2018         Assessment/Plan:    1. Hyperthyroidism  Assessment & Plan:  She has an elevated fT4 upto double normal with a normal TSH.    Today we discussed all aspects of hyperthyroidism including normal thyroid physiology, pathophysiology, review of data, treatment options, dose titration and follow up needs.    We agreed to check a complete thyroid profile including TSI and autoantibodies. I am unconvinced of any significant clinical pathology but will r/o Graves disease and a Toxic thyroid nodule.    Follow up in 3 months as needed.    Orders:  -     T3; Future  -     T3, free; Future  -     T4; Future  -     T4, free; Future  -     TSH, 3rd generation; Future  -     Thyroid stimulating immunoglobulin; Future  -     Thyroid Antibodies Panel; Future  2. Elevated serum free T4 level  -     Ambulatory Referral to Endocrinology  3. Prediabetes  4. Age related osteoporosis, unspecified pathological fracture presence  Assessment & Plan:  She reports early menopause age 43yrs. Cannot recall HRT.    She had serial DXA scans until 2015.    Today we discussed all aspects of osteoporosis including pathophysiology, risk factors, complications, diet, calcium 1200mg/day, vitamin D supplementation to maintain goal >30ng/dL, lifestyle modifications, medical fitness training, goals of therapy, follow up needs and medications including Alendronate, zolendronate, denosumab, romosozumab, foreo and tymlos.    We agreed to repeat a DXA scan to treat for a fracture risk as she has  good QoL that can be preserved.  May follow with pcp or myself.    Orders:  -     Vitamin D 25 hydroxy; Future  -     PTH, intact; Future  -     Phosphorus; Future  -     DXA bone density spine hip and pelvis; Future; Expected date: 01/03/2025  -     Comprehensive metabolic panel; Future  5. Thyroid nodule  Assessment & Plan:  R/o toxic thyroid nodule.  Exam was equivocal.  Orders:  -     US thyroid; Future; Expected date: 01/03/2025        I have spent a total time of  minutes on 01/03/25 in caring for this patient including greater than 50% of this time was spent in counseling/coordination of care as listed above.       Discussed with the patient and all questioned fully answered. She will contact me with concerns.    Shavonne May

## 2025-01-03 NOTE — ASSESSMENT & PLAN NOTE
She has an elevated fT4 upto double normal with a normal TSH.    Today we discussed all aspects of hyperthyroidism including normal thyroid physiology, pathophysiology, review of data, treatment options, dose titration and follow up needs.    We agreed to check a complete thyroid profile including TSI and autoantibodies. I am unconvinced of any significant clinical pathology but will r/o Graves disease and a Toxic thyroid nodule.    Follow up in 3 months as needed.

## 2025-01-10 ENCOUNTER — HOSPITAL ENCOUNTER (OUTPATIENT)
Dept: RADIOLOGY | Age: 85
Discharge: HOME | End: 2025-01-10
Payer: MEDICARE

## 2025-01-10 DIAGNOSIS — E04.1 THYROID NODULE: ICD-10-CM

## 2025-01-10 PROCEDURE — 76536 US EXAM OF HEAD AND NECK: CPT

## 2025-01-15 ENCOUNTER — RESULTS FOLLOW-UP (OUTPATIENT)
Dept: ENDOCRINOLOGY | Facility: CLINIC | Age: 85
End: 2025-01-15

## 2025-01-15 NOTE — RESULT ENCOUNTER NOTE
Howard Grant     I am covering for Dr. May.  Thyroid ultrasound showed thyroid nodules which are less than 1 cm, does not meet criteria for biopsy or follow-up.  This is assuring finding    Dr. Domingo Deras MD

## 2025-03-04 ENCOUNTER — APPOINTMENT (OUTPATIENT)
Dept: LAB | Age: 85
End: 2025-03-04
Payer: COMMERCIAL

## 2025-03-04 DIAGNOSIS — N39.0 URINARY TRACT INFECTION WITHOUT HEMATURIA, SITE UNSPECIFIED: ICD-10-CM

## 2025-03-04 LAB
BUN SERPL-MCNC: 22 MG/DL (ref 5–25)
CREAT SERPL-MCNC: 0.8 MG/DL (ref 0.6–1.3)
GFR SERPL CREATININE-BSD FRML MDRD: 66 ML/MIN/1.73SQ M

## 2025-03-04 PROCEDURE — 82565 ASSAY OF CREATININE: CPT

## 2025-03-04 PROCEDURE — 84520 ASSAY OF UREA NITROGEN: CPT

## 2025-03-04 PROCEDURE — 36415 COLL VENOUS BLD VENIPUNCTURE: CPT

## 2025-03-31 DIAGNOSIS — I10 ACCELERATED HYPERTENSION: ICD-10-CM

## 2025-03-31 RX ORDER — AMLODIPINE BESYLATE 2.5 MG/1
TABLET ORAL
Qty: 270 TABLET | Refills: 1 | Status: SHIPPED | OUTPATIENT
Start: 2025-03-31

## 2025-03-31 NOTE — TELEPHONE ENCOUNTER
Reason for call:   [x] Refill   [] Prior Auth  [] Other:     Office:   [x] PCP/Provider - Valentino Roth MD   [] Specialty/Provider -     Medication: amLODIPine (NORVASC) 2.5 mg tablet     Dose/Frequency:     TAKE 2 TABLETS EVERY MORNING AND TAKE 1 TABLET IN THE EVENING       Quantity: 270    Pharmacy: [facility]/pharmacy #[id number] - [place], [place] - [address] [phone]     Local Pharmacy   Does the patient have enough for 3 days?   [x] Yes   [] No - Send as HP to POD    Mail Away Pharmacy   Does the patient have enough for 10 days?   [] Yes   [] No - Send as HP to POD

## 2025-04-16 ENCOUNTER — TELEPHONE (OUTPATIENT)
Age: 85
End: 2025-04-16

## 2025-04-16 DIAGNOSIS — R73.03 PREDIABETES: ICD-10-CM

## 2025-04-16 DIAGNOSIS — I10 PRIMARY HYPERTENSION: Primary | ICD-10-CM

## 2025-04-16 NOTE — TELEPHONE ENCOUNTER
Patient is scheduled for a 6 month follow up on 4/29.  She called and stated she went to the lab, as she was sure that she had lab orders from Dr. Roth, but was told that he did not order any labs for her to complete.  The only orders in the chart are from Diabetes and Endocrinology, which she no longer needs to see.  She usually has her hemoglobin A1C checked and would like to know if orders can be placed.  She was not aware that there were labs ordered by Endocrinology as she was told everything was fine after her thyroid ultrasound.  She would also like to know if she should complete those orders for Dr. Roth to review.  Please advise.  Thank you!

## 2025-04-17 NOTE — TELEPHONE ENCOUNTER
Called patient no response left message on voicemail to call the office back was calling to give message please see down below

## 2025-04-18 ENCOUNTER — APPOINTMENT (OUTPATIENT)
Dept: LAB | Age: 85
End: 2025-04-18
Attending: FAMILY MEDICINE
Payer: COMMERCIAL

## 2025-04-18 DIAGNOSIS — E05.90 HYPERTHYROIDISM: ICD-10-CM

## 2025-04-18 DIAGNOSIS — M81.0 AGE RELATED OSTEOPOROSIS, UNSPECIFIED PATHOLOGICAL FRACTURE PRESENCE: ICD-10-CM

## 2025-04-18 DIAGNOSIS — I10 PRIMARY HYPERTENSION: ICD-10-CM

## 2025-04-18 LAB
25(OH)D3 SERPL-MCNC: 60 NG/ML (ref 30–100)
ALBUMIN SERPL BCG-MCNC: 4 G/DL (ref 3.5–5)
ALP SERPL-CCNC: 60 U/L (ref 34–104)
ALT SERPL W P-5'-P-CCNC: 17 U/L (ref 7–52)
ANION GAP SERPL CALCULATED.3IONS-SCNC: 8 MMOL/L (ref 4–13)
AST SERPL W P-5'-P-CCNC: 19 U/L (ref 13–39)
BASOPHILS # BLD AUTO: 0.04 THOUSANDS/ÂΜL (ref 0–0.1)
BASOPHILS NFR BLD AUTO: 1 % (ref 0–1)
BILIRUB SERPL-MCNC: 0.7 MG/DL (ref 0.2–1)
BUN SERPL-MCNC: 14 MG/DL (ref 5–25)
CALCIUM SERPL-MCNC: 9 MG/DL (ref 8.4–10.2)
CHLORIDE SERPL-SCNC: 106 MMOL/L (ref 96–108)
CO2 SERPL-SCNC: 27 MMOL/L (ref 21–32)
CREAT SERPL-MCNC: 0.82 MG/DL (ref 0.6–1.3)
EOSINOPHIL # BLD AUTO: 0.15 THOUSAND/ÂΜL (ref 0–0.61)
EOSINOPHIL NFR BLD AUTO: 3 % (ref 0–6)
ERYTHROCYTE [DISTWIDTH] IN BLOOD BY AUTOMATED COUNT: 11.9 % (ref 11.6–15.1)
EST. AVERAGE GLUCOSE BLD GHB EST-MCNC: 123 MG/DL
GFR SERPL CREATININE-BSD FRML MDRD: 65 ML/MIN/1.73SQ M
GLUCOSE P FAST SERPL-MCNC: 94 MG/DL (ref 65–99)
HBA1C MFR BLD: 5.9 %
HCT VFR BLD AUTO: 40.8 % (ref 34.8–46.1)
HGB BLD-MCNC: 13.4 G/DL (ref 11.5–15.4)
IMM GRANULOCYTES # BLD AUTO: 0.01 THOUSAND/UL (ref 0–0.2)
IMM GRANULOCYTES NFR BLD AUTO: 0 % (ref 0–2)
LYMPHOCYTES # BLD AUTO: 1.21 THOUSANDS/ÂΜL (ref 0.6–4.47)
LYMPHOCYTES NFR BLD AUTO: 26 % (ref 14–44)
MCH RBC QN AUTO: 30.4 PG (ref 26.8–34.3)
MCHC RBC AUTO-ENTMCNC: 32.8 G/DL (ref 31.4–37.4)
MCV RBC AUTO: 93 FL (ref 82–98)
MONOCYTES # BLD AUTO: 0.55 THOUSAND/ÂΜL (ref 0.17–1.22)
MONOCYTES NFR BLD AUTO: 12 % (ref 4–12)
NEUTROPHILS # BLD AUTO: 2.79 THOUSANDS/ÂΜL (ref 1.85–7.62)
NEUTS SEG NFR BLD AUTO: 58 % (ref 43–75)
NRBC BLD AUTO-RTO: 0 /100 WBCS
PHOSPHATE SERPL-MCNC: 3.9 MG/DL (ref 2.3–4.1)
PLATELET # BLD AUTO: 273 THOUSANDS/UL (ref 149–390)
PMV BLD AUTO: 10.2 FL (ref 8.9–12.7)
POTASSIUM SERPL-SCNC: 3.9 MMOL/L (ref 3.5–5.3)
PROT SERPL-MCNC: 6.3 G/DL (ref 6.4–8.4)
PTH-INTACT SERPL-MCNC: 51.9 PG/ML (ref 12–88)
RBC # BLD AUTO: 4.41 MILLION/UL (ref 3.81–5.12)
SODIUM SERPL-SCNC: 141 MMOL/L (ref 135–147)
T3 SERPL-MCNC: 0.6 NG/ML (ref 0.9–1.8)
T3FREE SERPL-MCNC: 2.78 PG/ML (ref 2.5–3.9)
T4 FREE SERPL-MCNC: 0.85 NG/DL (ref 0.61–1.12)
T4 SERPL-MCNC: 6.8 UG/DL (ref 6.09–12.23)
TSH SERPL DL<=0.05 MIU/L-ACNC: 3.33 UIU/ML (ref 0.45–4.5)
WBC # BLD AUTO: 4.75 THOUSAND/UL (ref 4.31–10.16)

## 2025-04-18 PROCEDURE — 82306 VITAMIN D 25 HYDROXY: CPT

## 2025-04-18 PROCEDURE — 86800 THYROGLOBULIN ANTIBODY: CPT

## 2025-04-18 PROCEDURE — 36415 COLL VENOUS BLD VENIPUNCTURE: CPT

## 2025-04-18 PROCEDURE — 86376 MICROSOMAL ANTIBODY EACH: CPT

## 2025-04-18 PROCEDURE — 83036 HEMOGLOBIN GLYCOSYLATED A1C: CPT | Performed by: FAMILY MEDICINE

## 2025-04-18 PROCEDURE — 84445 ASSAY OF TSI GLOBULIN: CPT

## 2025-04-18 PROCEDURE — 84480 ASSAY TRIIODOTHYRONINE (T3): CPT

## 2025-04-18 PROCEDURE — 85025 COMPLETE CBC W/AUTO DIFF WBC: CPT

## 2025-04-18 PROCEDURE — 84439 ASSAY OF FREE THYROXINE: CPT

## 2025-04-18 PROCEDURE — 84443 ASSAY THYROID STIM HORMONE: CPT

## 2025-04-18 PROCEDURE — 84100 ASSAY OF PHOSPHORUS: CPT

## 2025-04-18 PROCEDURE — 80053 COMPREHEN METABOLIC PANEL: CPT

## 2025-04-18 PROCEDURE — 84436 ASSAY OF TOTAL THYROXINE: CPT

## 2025-04-18 PROCEDURE — 83970 ASSAY OF PARATHORMONE: CPT

## 2025-04-18 PROCEDURE — 84481 FREE ASSAY (FT-3): CPT

## 2025-04-19 LAB
THYROGLOB AB SERPL-ACNC: <1 IU/ML (ref 0–0.9)
THYROPEROXIDASE AB SERPL-ACNC: 11 IU/ML (ref 0–34)

## 2025-04-21 LAB — TSI SER-ACNC: <0.1 IU/L (ref 0–0.55)

## 2025-04-29 ENCOUNTER — OFFICE VISIT (OUTPATIENT)
Dept: FAMILY MEDICINE CLINIC | Facility: CLINIC | Age: 85
End: 2025-04-29
Payer: COMMERCIAL

## 2025-04-29 VITALS
BODY MASS INDEX: 25.61 KG/M2 | DIASTOLIC BLOOD PRESSURE: 62 MMHG | WEIGHT: 163.5 LBS | HEART RATE: 65 BPM | TEMPERATURE: 98.2 F | SYSTOLIC BLOOD PRESSURE: 134 MMHG | RESPIRATION RATE: 16 BRPM | OXYGEN SATURATION: 98 %

## 2025-04-29 DIAGNOSIS — R73.03 PREDIABETES: ICD-10-CM

## 2025-04-29 DIAGNOSIS — E05.90 HYPERTHYROIDISM: ICD-10-CM

## 2025-04-29 DIAGNOSIS — I10 PRIMARY HYPERTENSION: Primary | ICD-10-CM

## 2025-04-29 PROCEDURE — 99214 OFFICE O/P EST MOD 30 MIN: CPT | Performed by: FAMILY MEDICINE

## 2025-04-29 PROCEDURE — G2211 COMPLEX E/M VISIT ADD ON: HCPCS | Performed by: FAMILY MEDICINE

## 2025-04-29 RX ORDER — CIPROFLOXACIN 500 MG/1
TABLET, FILM COATED ORAL
COMMUNITY
Start: 2025-02-13

## 2025-04-29 NOTE — ASSESSMENT & PLAN NOTE
Blood pressures have stable on Amlodipine 5 mg AM and 2.5 mg PM, Irbesartan 300 mg daily and Metoprolol 25 mg BID. Past history of hyponatremia and hypokalemia while on HCTZ.  08/2024 creatinine 0.69. GFR 79. 06/2024 Hgb 13.8. 05/2024 Electrolytes normal. Na+ 138. K+ 4.0.         BP Readings from Last 3 Encounters:   04/29/25 134/62   01/03/25 154/78   10/14/24 138/78     Lab Results   Component Value Date     06/19/2015    SODIUM 141 04/18/2025    K 3.9 04/18/2025     04/18/2025    CO2 27 04/18/2025    ANIONGAP 4 06/11/2015    AGAP 8 04/18/2025    BUN 14 04/18/2025    CREATININE 0.82 04/18/2025    GLUC 114 06/29/2024    GLUF 94 04/18/2025    CALCIUM 9.0 04/18/2025    AST 19 04/18/2025    ALT 17 04/18/2025    ALKPHOS 60 04/18/2025    PROT 6.7 06/11/2015    TP 6.3 (L) 04/18/2025    BILITOT 0.46 06/11/2015    TBILI 0.70 04/18/2025    EGFR 65 04/18/2025     Lab Results   Component Value Date    WBC 4.75 04/18/2025    HGB 13.4 04/18/2025    HCT 40.8 04/18/2025    MCV 93 04/18/2025     04/18/2025       Continue with current medications. OV 6 months with labs    Orders:    CBC and differential    Comprehensive metabolic panel

## 2025-04-29 NOTE — ASSESSMENT & PLAN NOTE
Endocrinology evaluation for elevated free T4 with an elevated TSH.     04/2025 TSH normal at 3.330 improved from 5.166. Free T4 normal at 0.85 improved from 2.12. Free T3 normal. TSI < 0.10     01/2025 thyroid u/s Heterogeneous vascular thyroid gland. No nodule meets current ACR criteria for requiring biopsy or follow-up ultrasounds.       Lab Results   Component Value Date    [id number] 3.330 04/18/2025       Orders:    TSH, 3rd generation with Free T4 reflex

## 2025-04-29 NOTE — ASSESSMENT & PLAN NOTE
Lab Results   Component Value Date    HGBA1C 5.9 (H) 04/18/2025       Orders:    Hemoglobin A1C      Diet, weight loss and exercise.

## 2025-04-29 NOTE — ASSESSMENT & PLAN NOTE
Past history of leukopenia.  Elevated WBC 6/2024 due to oral steroids      Lab Results   Component Value Date    WBC 4.75 04/18/2025    HGB 13.4 04/18/2025    HCT 40.8 04/18/2025    MCV 93 04/18/2025     04/18/2025     WBC   Date Value Ref Range Status   04/18/2025 4.75 4.31 - 10.16 Thousand/uL Final   11/14/2024 5.11 4.31 - 10.16 Thousand/uL Final   06/29/2024 13.21 (H) 4.31 - 10.16 Thousand/uL Final   06/11/2015 4.75 4.31 - 10.16 Thousand/uL Final   04/02/2014 5.24 4.31 - 10.16 Thousand/uL Final     Comment:     New Reference Range     06/2018  Vitamin B12 589.  Folate greater than 20.     SmartLink not supported outside of the Encounter Diagnoses SmartSection.

## 2025-04-29 NOTE — PROGRESS NOTES
Name: Rosa Maria Grant      : 1940      MRN: [id number]  Encounter Provider: Valentino Roth MD  Encounter Date: 2025   Encounter department: [facility]  :  Assessment & Plan  Primary hypertension    Blood pressures have stable on Amlodipine 5 mg AM and 2.5 mg PM, Irbesartan 300 mg daily and Metoprolol 25 mg BID. Past history of hyponatremia and hypokalemia while on HCTZ.  2024 creatinine 0.69. GFR 79. 2024 Hgb 13.8. 2024 Electrolytes normal. Na+ 138. K+ 4.0.         BP Readings from Last 3 Encounters:   25 134/62   25 154/78   10/14/24 138/78     Lab Results   Component Value Date     2015    SODIUM 141 2025    K 3.9 2025     2025    CO2 27 2025    ANIONGAP 4 2015    AGAP 8 2025    BUN 14 2025    CREATININE 0.82 2025    GLUC 114 2024    GLUF 94 2025    CALCIUM 9.0 2025    AST 19 2025    ALT 17 2025    ALKPHOS 60 2025    PROT 6.7 2015    TP 6.3 (L) 2025    BILITOT 0.46 2015    TBILI 0.70 2025    EGFR 65 2025     Lab Results   Component Value Date    WBC 4.75 2025    HGB 13.4 2025    HCT 40.8 2025    MCV 93 2025     2025       Continue with current medications. OV 6 months with labs    Orders:    CBC and differential    Comprehensive metabolic panel      Prediabetes    Lab Results   Component Value Date    HGBA1C 5.9 (H) 2025       Orders:    Hemoglobin A1C      Diet, weight loss and exercise.    Hyperthyroidism    Endocrinology evaluation for elevated free T4 with an elevated TSH.     2025 TSH normal at 3.330 improved from 5.166. Free T4 normal at 0.85 improved from 2.12. Free T3 normal. TSI < 0.10     2025 thyroid u/s Heterogeneous vascular thyroid gland. No nodule meets current ACR criteria for requiring biopsy or follow-up ultrasounds.       Lab Results   Component Value Date     IAN3MPDVNBOE 3.330 04/18/2025       Orders:    TSH, 3rd generation with Free T4 reflex             History of Present Illness   HPI  Review of Systems   Constitutional:  Negative for appetite change, chills, fatigue, fever and unexpected weight change.   HENT:  Positive for congestion. Negative for ear pain, hearing loss, rhinorrhea, sore throat and trouble swallowing.         Chronic nasal congestion improved with Flonase.    Eyes:  Negative for visual disturbance.        Cataract surgery OU    Respiratory:  Negative for cough, shortness of breath and wheezing.    Cardiovascular:  Negative for chest pain, palpitations and leg swelling.        01/2020 admission for substernal chest pain. EKG NSR with frequent PVCs. she was sent to [facility]. Initial EKG NSR with fusion complexes. No acute changes. 2nd EKG sinus bradycardia with PACs. troponins x 3 negative.  Chest x-ray no active disease.  Stress echocardiogram normal.  EF 60%.   Gastrointestinal:  Positive for constipation. Negative for abdominal pain, blood in stool, diarrhea, nausea and vomiting.         Chronic constipation with recent exacerbation-improved after taking Metamucil/Dulcolax Colonoscopy 03/2019 normal except for diverticulosis. + FH colon CA sister.     Endocrine: Negative for polydipsia and polyuria.         02/2015 DEXA scan normal.                   Genitourinary:  Negative for difficulty urinating, dysuria and hematuria.        History of recurrent UTIs followed by Urology. On Hiprex.  02/2019 kidney bladder ultrasound normal minimal PVR. 29 ML.  Simple cyst left adnexa without significant change.  pelvic u/s 08/2016 stable simple left ovarian cyst. 01/2017  normal at 9.5 followed by GYN.,    Musculoskeletal:  Negative for arthralgias, back pain and myalgias.   Skin:  Negative for rash.   Neurological:  Negative for dizziness and headaches.   Hematological:  Negative for adenopathy. Does not bruise/bleed easily.                      Psychiatric/Behavioral:  Negative for dysphoric mood and sleep disturbance. The patient is not nervous/anxious.        Objective   /62 (BP Location: Left arm, Patient Position: Sitting, Cuff Size: Large)   Pulse 65   Temp 98.2 °F (36.8 °C) (Temporal)   Resp 16   Wt 74.2 kg (163 lb 8 oz)   SpO2 98%   BMI 25.61 kg/m²      Wt Readings from Last 3 Encounters:   04/29/25 74.2 kg (163 lb 8 oz)   01/03/25 75.4 kg (166 lb 3.2 oz)   10/14/24 76.7 kg (169 lb)        Physical Exam  Constitutional:       General: She is not in acute distress.  HENT:      Right Ear: Tympanic membrane and ear canal normal.      Left Ear: Tympanic membrane and ear canal normal.   Eyes:      General: No scleral icterus.     Conjunctiva/sclera: Conjunctivae normal.   Neck:      Thyroid: No thyroid mass or thyromegaly.      Vascular: Normal carotid pulses. No carotid bruit.   Cardiovascular:      Rate and Rhythm: Normal rate and regular rhythm.      Heart sounds: No murmur heard.     No gallop.   Pulmonary:      Effort: Pulmonary effort is normal.      Breath sounds: Normal breath sounds. No wheezing or rales.   Abdominal:      General: Bowel sounds are normal. There is no distension.      Palpations: Abdomen is soft. There is no hepatomegaly, splenomegaly or mass.      Tenderness: There is no abdominal tenderness. There is no guarding or rebound.   Musculoskeletal:      Right lower leg: No edema.      Left lower leg: No edema.   Lymphadenopathy:      Cervical: No cervical adenopathy.      Upper Body:      Right upper body: No supraclavicular adenopathy.      Left upper body: No supraclavicular adenopathy.   Skin:     Findings: No rash.   Neurological:      General: No focal deficit present.      Mental Status: She is alert and oriented to person, place, and time.   Psychiatric:         Mood and Affect: Mood normal.

## 2025-05-26 ENCOUNTER — OFFICE VISIT (OUTPATIENT)
Dept: URGENT CARE | Age: 85
End: 2025-05-26
Payer: COMMERCIAL

## 2025-05-26 VITALS
HEART RATE: 69 BPM | SYSTOLIC BLOOD PRESSURE: 151 MMHG | TEMPERATURE: 97.3 F | DIASTOLIC BLOOD PRESSURE: 86 MMHG | RESPIRATION RATE: 18 BRPM | OXYGEN SATURATION: 98 %

## 2025-05-26 DIAGNOSIS — L20.9 ATOPIC DERMATITIS, UNSPECIFIED TYPE: Primary | ICD-10-CM

## 2025-05-26 PROCEDURE — 99214 OFFICE O/P EST MOD 30 MIN: CPT

## 2025-05-26 RX ORDER — DIPHENHYDRAMINE HCL 25 MG
25 TABLET ORAL ONCE
Status: COMPLETED | OUTPATIENT
Start: 2025-05-26 | End: 2025-05-26

## 2025-05-26 RX ORDER — PREDNISONE 10 MG/1
TABLET ORAL
Qty: 21 TABLET | Refills: 0 | Status: SHIPPED | OUTPATIENT
Start: 2025-05-26

## 2025-05-26 RX ORDER — PREDNISONE 20 MG/1
60 TABLET ORAL ONCE
Status: COMPLETED | OUTPATIENT
Start: 2025-05-26 | End: 2025-05-26

## 2025-05-26 RX ADMIN — Medication 25 MG: at 11:17

## 2025-05-26 RX ADMIN — PREDNISONE 60 MG: 20 TABLET ORAL at 11:17

## 2025-05-26 NOTE — PATIENT INSTRUCTIONS
Monitor site for increased redness, change in rash, increased swelling, or drainage.     If this develops or you develop any fever, chills, aches, joint pain, swelling, headache, dizziness, numbness or any new or concerning symptoms please proceed to ER.    Take Prednisone as directed.  Do not take any additional motrin/advil/ibuprofen while on the prednisone.  You may take additional tylenol as needed for pain.     Good hand hygiene  Avoid scratching area  Keep area clean and dry    Watch for signs of increased infection as previously discussed    If you develop any facial swelling, shortness of breath, difficulty breathing or any new or concerning symptoms please return or proceed ER.     Follow up with PCP in 3-5 days.

## 2025-05-26 NOTE — PROGRESS NOTES
[facility]  Name: Rosa Maria Grant      : 1940      MRN: [id number]  Encounter Provider: PHYLLIS Chamorro  Encounter Date: 2025   Encounter department: [facility] [place]  :  Assessment & Plan  Atopic dermatitis, unspecified type    Orders:  •  predniSONE 10 mg tablet; 6 Tabs day 1, 5 tabs day 2, 4 tabs day 3, 3 tabs day 4, 2 tabs day 5, 1 tab day 6  •  predniSONE tablet 60 mg  •  diphenhydrAMINE (BENADRYL) tablet 25 mg    Chart reviewed.  Benadryl given in office.  Monitor site for increased redness, change in rash, increased swelling, or drainage.     If this develops or you develop any fever, chills, aches, joint pain, swelling, headache, dizziness, numbness or any new or concerning symptoms please proceed to ER.    Take Prednisone as directed.  Do not take any additional motrin/advil/ibuprofen while on the prednisone.  You may take additional tylenol as needed for pain.     Good hand hygiene  Avoid scratching area  Keep area clean and dry    Watch for signs of increased infection as previously discussed    If you develop any facial swelling, shortness of breath, difficulty breathing or any new or concerning symptoms please return or proceed ER.     Follow up with PCP in 3-5 days.     Patient Instructions  Follow up with PCP in 3-5 days.  Proceed to  ER if symptoms worsen.    If tests are performed, our office will contact you with results only if changes need to made to the care plan discussed with you at the visit. [facility]    Chief Complaint:   Chief Complaint   Patient presents with   • Rash     Rash of face on right side since middle of night around 2:00 am. Started      History of Present Illness {?Quick Links Encounters * My Last Note * Last Note in Specialty * Snapshot * Since Last Visit * History :42134}  Pt is a 85 year old female presenting with 1 day of redness, burning, and swelling to face and forehead.  She  reports working in the garden 1 day ago and was unsure if she was exposed to poison ivy.  She denies rash to other areas of her body, fever or chills, difficulty breathing, sob, pain or fever/chills.  She has not taken anything for her symptoms. She reports having similar rash to her face the same time last year with unknown etiology.            Review of Systems   Constitutional:  Negative for chills and fever.   Respiratory:  Negative for cough, shortness of breath, wheezing and stridor.    Skin:  Positive for rash. Negative for wound.     Past Medical History   Past Medical History[1]  Past Surgical History[2]  Family History[3]  she reports that she has never smoked. She has never used smokeless tobacco. She reports current alcohol use. She reports that she does not use drugs.  Current Outpatient Medications   Medication Instructions   • amLODIPine (NORVASC) 2.5 mg tablet TAKE 2 TABLETS EVERY MORNING AND TAKE 1 TABLET IN THE EVENING   • Calcium Carb-Cholecalciferol 1000-800 MG-UNIT TABS Take by mouth   • ciprofloxacin (CIPRO) 500 mg tablet    • Cranberry 37370 MG CAPS 1 capsule, Daily   • irbesartan (AVAPRO) 300 mg, Oral, Daily at bedtime   • ketoconazole (NIZORAL) 2 % shampoo 2 times weekly   • methenamine hippurate (HIPREX) 0.5 g, 2 times daily   • metoprolol tartrate (LOPRESSOR) 25 mg, Oral, 2 times daily   • multivitamin (THERAGRAN) TABS 1 tablet   • predniSONE 10 mg tablet 6 Tabs day 1, 5 tabs day 2, 4 tabs day 3, 3 tabs day 4, 2 tabs day 5, 1 tab day 6   Allergies[4]     Objective {?Quick Links Trend Vitals * Enter New Vitals * Results Review * Timeline (Adult) * Labs * Imaging * Cardiology * Procedures * Lung Cancer Screening * Surgical eConsent :66706}  /86   Pulse 69   Temp (!) 97.3 °F (36.3 °C) (Tympanic)   Resp 18   SpO2 98%      Physical Exam  Vitals and nursing note reviewed.   Constitutional:       General: She is not in acute distress.     Appearance: Normal appearance. She is normal  "weight. She is not ill-appearing.   HENT:      Head: Normocephalic and atraumatic.      Right Ear: Tympanic membrane normal.      Left Ear: Tympanic membrane normal.      Nose: Nose normal. No congestion or rhinorrhea.      Comments: No rash to nose.      Mouth/Throat:      Mouth: Mucous membranes are moist.      Pharynx: No oropharyngeal exudate or posterior oropharyngeal erythema.     Cardiovascular:      Rate and Rhythm: Normal rate and regular rhythm.      Pulses: Normal pulses.   Pulmonary:      Effort: Pulmonary effort is normal. No respiratory distress.      Breath sounds: Normal breath sounds. No stridor. No wheezing, rhonchi or rales.   Chest:      Chest wall: No tenderness.   Abdominal:      Palpations: Abdomen is soft.     Musculoskeletal:      Cervical back: Normal range of motion.     Skin:     General: Skin is warm.      Capillary Refill: Capillary refill takes less than 2 seconds.      Findings: Rash present. Rash is macular. Rash is not crusting, nodular, papular, purpuric, pustular, urticarial or vesicular.          Neurological:      Mental Status: She is alert.         Portions of the record may have been created with voice recognition software.  Occasional wrong word or \"sound a like\" substitutions may have occurred due to the inherent limitations of voice recognition software.  Read the chart carefully and recognize, using context, where substitutions have occurred.         [1]  Past Medical History:  Diagnosis Date   • Accelerated essential hypertension     last assessed 08/17/2015   • Allergic Pencillin   • Arthritis    • Depression with anxiety     last assesed 08/16/2012   • Hypertension    • Neutropenia (HCC) 01/08/2020   • Post-menopausal bleeding 12/05/2013   • Strep pharyngitis 08/18/2019   [2]  Past Surgical History:  Procedure Laterality Date   • BLEPHAROPLASTY      upper lid w/excessive skin   • CARDIOVASCULAR STRESS TEST      onset June 2005   • COLONOSCOPY     • DILATION AND " CURETTAGE, DIAGNOSTIC / THERAPEUTIC     • ENDOMETRIAL BIOPSY      negative for dysplasia, hyperplasia and malignancy, resolved 03/25/2013   • TUBAL LIGATION     [3]  Family History  Problem Relation Name Age of Onset   • Heart disease Father Shakila Robles         cardiac disorder   • Skin cancer Father Shakila Robles    • Colon cancer Sister Katherine Driscoll 81   • Heart disease Family     • Hypertension Family     • Coronary artery disease Family     • No Known Problems Daughter     • No Known Problems Sister     • No Known Problems Sister     • No Known Problems Daughter     • No Known Problems Daughter     • No Known Problems Maternal Aunt     • No Known Problems Maternal Aunt     • No Known Problems Maternal Aunt     • No Known Problems Paternal Aunt     • No Known Problems Paternal Aunt     • No Known Problems Paternal Aunt     • No Known Problems Mother     • No Known Problems Maternal Grandmother     • No Known Problems Maternal Grandfather     • No Known Problems Paternal Grandmother     • No Known Problems Paternal Grandfather     [4]  Allergies  Allergen Reactions   • Penicillins Rash

## 2025-05-28 ENCOUNTER — DOCUMENTATION (OUTPATIENT)
Dept: ADMINISTRATIVE | Facility: OTHER | Age: 85
End: 2025-05-28

## 2025-05-28 NOTE — PROGRESS NOTES
PHYLLIS Chamorro  P Patient Reported Team         Blood pressure elevated  Appointment department: [facility]  Appointment provider: PHYLLIS Chamorro  Blood pressure  05/26/25 1059 151/86  05/26/25 1055 152/87    05/28/25 3:28 PM    Patient was called after the Urgent Care visit Patient has an upcoming appointment with their primary care provider on 6-2-25 to follow on an elevated Blood pressure.      Thank you.  Saad Ann MA  [facility]

## 2025-06-23 ENCOUNTER — TELEPHONE (OUTPATIENT)
Dept: FAMILY MEDICINE CLINIC | Facility: CLINIC | Age: 85
End: 2025-06-23

## 2025-06-23 DIAGNOSIS — I10 PRIMARY HYPERTENSION: ICD-10-CM

## 2025-06-23 DIAGNOSIS — I10 ACCELERATED HYPERTENSION: ICD-10-CM

## 2025-06-23 RX ORDER — AMLODIPINE BESYLATE 2.5 MG/1
TABLET ORAL
Qty: 270 TABLET | Refills: 1 | Status: SHIPPED | OUTPATIENT
Start: 2025-06-23

## 2025-06-23 RX ORDER — IRBESARTAN 300 MG/1
300 TABLET ORAL
Qty: 90 TABLET | Refills: 3 | Status: SHIPPED | OUTPATIENT
Start: 2025-06-23

## 2025-06-23 RX ORDER — METOPROLOL TARTRATE 25 MG/1
25 TABLET, FILM COATED ORAL 2 TIMES DAILY
Qty: 180 TABLET | Refills: 3 | Status: SHIPPED | OUTPATIENT
Start: 2025-06-23 | End: 2026-06-18

## 2025-06-23 NOTE — TELEPHONE ENCOUNTER
Called patient no response left message to call the office back, was calling to give message please see down below    Scripts placed in front filing cabinet

## 2025-06-23 NOTE — TELEPHONE ENCOUNTER
Patient came in on 6/23/25 and  would like written script for a 90 day supply for Irbesartan 300 mg, Amlodipine  2.5 mg, Metoprolol Tartrate 25 mg. Will call patient when written scripts are completed and ready for . Patient was also  wondering if there is a urologist you can recommend her. Please advise.

## 2025-06-25 ENCOUNTER — TELEPHONE (OUTPATIENT)
Age: 85
End: 2025-06-25

## 2025-06-25 NOTE — TELEPHONE ENCOUNTER
New Patient      Insurance   Current Insurance?[employer] ppo   Insurance E-verified? yes    History   Reason for appointment/active symptoms?  Patient called stating she is a former patient of Dr. Cook and she is to make a follow up appointment for her continuation of care for recurrent utis.      Has the patient had any previous Urologist(s)? Dr. Cook. Retiring      Was the patient seen in the ED?      Labs/Imaging(Including Out Of Network)?      Records Requested?   Records Visible in EPIC?      Personal history of cancer? no     Appointment   Office location preference:  [place]  ?   Appointment Details   Date:  09/25/25  Time:  9 am   Location:  [place]  Provider:  Ion Honeycutt PA-C   Does the appointment need further review?

## 2025-07-10 DIAGNOSIS — I10 PRIMARY HYPERTENSION: ICD-10-CM

## 2025-07-11 RX ORDER — METOPROLOL TARTRATE 25 MG/1
25 TABLET, FILM COATED ORAL 2 TIMES DAILY
Qty: 180 TABLET | Refills: 1 | Status: SHIPPED | OUTPATIENT
Start: 2025-07-11

## 2025-07-14 DIAGNOSIS — I10 ACCELERATED HYPERTENSION: ICD-10-CM

## 2025-07-14 RX ORDER — AMLODIPINE BESYLATE 2.5 MG/1
TABLET ORAL
Qty: 270 TABLET | Refills: 1 | Status: SHIPPED | OUTPATIENT
Start: 2025-07-14

## 2025-07-14 NOTE — TELEPHONE ENCOUNTER
Called patient on 7/14/25 and left message letting her know her medication was refilled and send to the [facility] pharmacy.

## 2025-07-14 NOTE — TELEPHONE ENCOUNTER
Patient came in on 7/14/25 and would like a refill on amplodipine 2.5 mg for a 90 day supply and send to the [facility] pharmacy in [place]. Patient is also requesting blood work for her kidney for her urologist. Please advise.